# Patient Record
Sex: FEMALE | Race: WHITE | NOT HISPANIC OR LATINO | Employment: UNEMPLOYED | RURAL
[De-identification: names, ages, dates, MRNs, and addresses within clinical notes are randomized per-mention and may not be internally consistent; named-entity substitution may affect disease eponyms.]

---

## 2020-12-09 ENCOUNTER — HISTORICAL (OUTPATIENT)
Dept: ADMINISTRATIVE | Facility: HOSPITAL | Age: 45
End: 2020-12-09

## 2020-12-09 LAB — SARS-COV+SARS-COV-2 AG RESP QL IA.RAPID: NEGATIVE

## 2020-12-10 ENCOUNTER — HISTORICAL (OUTPATIENT)
Dept: ADMINISTRATIVE | Facility: HOSPITAL | Age: 45
End: 2020-12-10

## 2020-12-13 LAB
MAYO GENERIC ORDERABLE RESULT: NORMAL
MAYO INTERP AND REPORT: NORMAL

## 2021-08-04 ENCOUNTER — OFFICE VISIT (OUTPATIENT)
Dept: FAMILY MEDICINE | Facility: CLINIC | Age: 46
End: 2021-08-04
Payer: COMMERCIAL

## 2021-08-04 VITALS
BODY MASS INDEX: 24.49 KG/M2 | OXYGEN SATURATION: 97 % | TEMPERATURE: 99 F | WEIGHT: 147 LBS | RESPIRATION RATE: 18 BRPM | HEIGHT: 65 IN | HEART RATE: 98 BPM | DIASTOLIC BLOOD PRESSURE: 80 MMHG | SYSTOLIC BLOOD PRESSURE: 100 MMHG

## 2021-08-04 DIAGNOSIS — Z20.822 SUSPECTED COVID-19 VIRUS INFECTION: ICD-10-CM

## 2021-08-04 DIAGNOSIS — Z20.828 CONTACT WITH AND (SUSPECTED) EXPOSURE TO OTHER VIRAL COMMUNICABLE DISEASES: Primary | ICD-10-CM

## 2021-08-04 LAB
CTP QC/QA: YES
SARS-COV-2 AG RESP QL IA.RAPID: NEGATIVE

## 2021-08-04 PROCEDURE — 87635 SARS-COV-2 (COVID-19) QUALITATIVE PCR: ICD-10-PCS | Mod: ,,, | Performed by: CLINICAL MEDICAL LABORATORY

## 2021-08-04 PROCEDURE — 99213 PR OFFICE/OUTPT VISIT, EST, LEVL III, 20-29 MIN: ICD-10-PCS | Mod: ,,, | Performed by: FAMILY MEDICINE

## 2021-08-04 PROCEDURE — 87426 SARS CORONAVIRUS 2 ANTIGEN POCT: ICD-10-PCS | Mod: QW,,, | Performed by: FAMILY MEDICINE

## 2021-08-04 PROCEDURE — 87635 SARS-COV-2 COVID-19 AMP PRB: CPT | Mod: ,,, | Performed by: CLINICAL MEDICAL LABORATORY

## 2021-08-04 PROCEDURE — 87426 SARSCOV CORONAVIRUS AG IA: CPT | Mod: QW,,, | Performed by: FAMILY MEDICINE

## 2021-08-04 PROCEDURE — 99213 OFFICE O/P EST LOW 20 MIN: CPT | Mod: ,,, | Performed by: FAMILY MEDICINE

## 2021-08-04 RX ORDER — ACETAMINOPHEN 500 MG
1000 TABLET ORAL EVERY 8 HOURS PRN
Qty: 60 TABLET | Refills: 1 | Status: SHIPPED | OUTPATIENT
Start: 2021-08-04 | End: 2023-04-03

## 2021-08-04 RX ORDER — IPRATROPIUM BROMIDE 42 UG/1
2 SPRAY, METERED NASAL 3 TIMES DAILY
Qty: 15 ML | Refills: 1 | Status: SHIPPED | OUTPATIENT
Start: 2021-08-04 | End: 2023-04-03

## 2021-08-04 RX ORDER — GUAIFENESIN/DEXTROMETHORPHAN 100-10MG/5
5 SYRUP ORAL EVERY 6 HOURS PRN
Qty: 236 ML | Refills: 1 | Status: SHIPPED | OUTPATIENT
Start: 2021-08-04 | End: 2021-08-14

## 2021-08-04 RX ORDER — TOPIRAMATE 100 MG/1
100 TABLET, FILM COATED ORAL 2 TIMES DAILY
COMMUNITY
End: 2023-06-21 | Stop reason: SDUPTHER

## 2021-08-04 RX ORDER — SUMATRIPTAN SUCCINATE 100 MG/1
100 TABLET ORAL DAILY PRN
COMMUNITY
End: 2023-04-03 | Stop reason: ALTCHOICE

## 2021-08-06 DIAGNOSIS — U07.1 COVID-19 VIRUS DETECTED: ICD-10-CM

## 2021-08-06 LAB — SARS-COV-2 RNA RESP QL NAA+PROBE: POSITIVE

## 2021-08-09 ENCOUNTER — INFUSION (OUTPATIENT)
Dept: INFECTIOUS DISEASES | Facility: HOSPITAL | Age: 46
End: 2021-08-09
Attending: FAMILY MEDICINE
Payer: COMMERCIAL

## 2021-08-09 VITALS
TEMPERATURE: 99 F | SYSTOLIC BLOOD PRESSURE: 113 MMHG | DIASTOLIC BLOOD PRESSURE: 81 MMHG | BODY MASS INDEX: 24.75 KG/M2 | OXYGEN SATURATION: 98 % | RESPIRATION RATE: 20 BRPM | HEART RATE: 79 BPM | HEIGHT: 64 IN | WEIGHT: 145 LBS

## 2021-08-09 DIAGNOSIS — U07.1 COVID-19: Primary | ICD-10-CM

## 2021-08-09 PROCEDURE — M0243 CASIRIVI AND IMDEVI INFUSION: HCPCS | Performed by: FAMILY MEDICINE

## 2021-08-09 PROCEDURE — 25000003 PHARM REV CODE 250: Performed by: FAMILY MEDICINE

## 2021-08-09 PROCEDURE — 96365 THER/PROPH/DIAG IV INF INIT: CPT

## 2021-08-09 PROCEDURE — 63600175 PHARM REV CODE 636 W HCPCS: Performed by: FAMILY MEDICINE

## 2021-08-09 RX ORDER — ACETAMINOPHEN 325 MG/1
650 TABLET ORAL ONCE AS NEEDED
Status: DISCONTINUED | OUTPATIENT
Start: 2021-08-09 | End: 2023-04-03 | Stop reason: ALTCHOICE

## 2021-08-09 RX ORDER — ONDANSETRON 4 MG/1
4 TABLET, ORALLY DISINTEGRATING ORAL ONCE AS NEEDED
Status: DISCONTINUED | OUTPATIENT
Start: 2021-08-09 | End: 2023-04-03 | Stop reason: ALTCHOICE

## 2021-08-09 RX ORDER — ALBUTEROL SULFATE 90 UG/1
2 AEROSOL, METERED RESPIRATORY (INHALATION)
Status: DISCONTINUED | OUTPATIENT
Start: 2021-08-09 | End: 2023-04-03 | Stop reason: ALTCHOICE

## 2021-08-09 RX ORDER — SODIUM CHLORIDE 0.9 % (FLUSH) 0.9 %
10 SYRINGE (ML) INJECTION
Status: DISCONTINUED | OUTPATIENT
Start: 2021-08-09 | End: 2023-05-17 | Stop reason: CLARIF

## 2021-08-09 RX ORDER — EPINEPHRINE 0.3 MG/.3ML
0.3 INJECTION SUBCUTANEOUS
Status: DISCONTINUED | OUTPATIENT
Start: 2021-08-09 | End: 2023-04-03 | Stop reason: ALTCHOICE

## 2021-08-09 RX ORDER — DIPHENHYDRAMINE HYDROCHLORIDE 50 MG/ML
25 INJECTION INTRAMUSCULAR; INTRAVENOUS ONCE AS NEEDED
Status: DISCONTINUED | OUTPATIENT
Start: 2021-08-09 | End: 2023-04-03 | Stop reason: ALTCHOICE

## 2021-08-09 RX ADMIN — SODIUM CHLORIDE 600 MG: 9 INJECTION, SOLUTION INTRAVENOUS at 12:08

## 2022-01-19 ENCOUNTER — LAB REQUISITION (OUTPATIENT)
Dept: LAB | Facility: HOSPITAL | Age: 47
End: 2022-01-19
Attending: NURSE PRACTITIONER
Payer: OTHER GOVERNMENT

## 2022-01-19 DIAGNOSIS — Z20.822 CONTACT WITH AND (SUSPECTED) EXPOSURE TO COVID-19: ICD-10-CM

## 2022-01-19 LAB — SARS-COV+SARS-COV-2 AG RESP QL IA.RAPID: NEGATIVE

## 2022-01-19 PROCEDURE — 87426 SARSCOV CORONAVIRUS AG IA: CPT | Performed by: NURSE PRACTITIONER

## 2023-03-29 ENCOUNTER — OFFICE VISIT (OUTPATIENT)
Dept: FAMILY MEDICINE | Facility: CLINIC | Age: 48
End: 2023-03-29
Payer: COMMERCIAL

## 2023-03-29 VITALS
WEIGHT: 155.19 LBS | DIASTOLIC BLOOD PRESSURE: 80 MMHG | OXYGEN SATURATION: 100 % | BODY MASS INDEX: 26.49 KG/M2 | SYSTOLIC BLOOD PRESSURE: 108 MMHG | RESPIRATION RATE: 18 BRPM | TEMPERATURE: 98 F | HEART RATE: 63 BPM | HEIGHT: 64 IN

## 2023-03-29 DIAGNOSIS — S29.012A STRAIN OF THORACIC PARASPINAL MUSCLES EXCLUDING T1 AND T2 LEVELS, INITIAL ENCOUNTER: Primary | ICD-10-CM

## 2023-03-29 DIAGNOSIS — Z12.11 ENCOUNTER FOR SCREENING FOR MALIGNANT NEOPLASM OF COLON: ICD-10-CM

## 2023-03-29 PROBLEM — Z20.828 CONTACT WITH AND (SUSPECTED) EXPOSURE TO OTHER VIRAL COMMUNICABLE DISEASES: Status: RESOLVED | Noted: 2021-08-04 | Resolved: 2023-03-29

## 2023-03-29 PROBLEM — Z20.822 SUSPECTED COVID-19 VIRUS INFECTION: Status: RESOLVED | Noted: 2021-08-04 | Resolved: 2023-03-29

## 2023-03-29 PROCEDURE — 1160F RVW MEDS BY RX/DR IN RCRD: CPT | Mod: ,,, | Performed by: NURSE PRACTITIONER

## 2023-03-29 PROCEDURE — 99213 PR OFFICE/OUTPT VISIT, EST, LEVL III, 20-29 MIN: ICD-10-PCS | Mod: 25,,, | Performed by: NURSE PRACTITIONER

## 2023-03-29 PROCEDURE — 3008F PR BODY MASS INDEX (BMI) DOCUMENTED: ICD-10-PCS | Mod: ,,, | Performed by: NURSE PRACTITIONER

## 2023-03-29 PROCEDURE — 3074F PR MOST RECENT SYSTOLIC BLOOD PRESSURE < 130 MM HG: ICD-10-PCS | Mod: ,,, | Performed by: NURSE PRACTITIONER

## 2023-03-29 PROCEDURE — 3008F BODY MASS INDEX DOCD: CPT | Mod: ,,, | Performed by: NURSE PRACTITIONER

## 2023-03-29 PROCEDURE — 1159F MED LIST DOCD IN RCRD: CPT | Mod: ,,, | Performed by: NURSE PRACTITIONER

## 2023-03-29 PROCEDURE — 3074F SYST BP LT 130 MM HG: CPT | Mod: ,,, | Performed by: NURSE PRACTITIONER

## 2023-03-29 PROCEDURE — 1159F PR MEDICATION LIST DOCUMENTED IN MEDICAL RECORD: ICD-10-PCS | Mod: ,,, | Performed by: NURSE PRACTITIONER

## 2023-03-29 PROCEDURE — 3079F DIAST BP 80-89 MM HG: CPT | Mod: ,,, | Performed by: NURSE PRACTITIONER

## 2023-03-29 PROCEDURE — 96372 PR INJECTION,THERAP/PROPH/DIAG2ST, IM OR SUBCUT: ICD-10-PCS | Mod: ,,, | Performed by: NURSE PRACTITIONER

## 2023-03-29 PROCEDURE — 1160F PR REVIEW ALL MEDS BY PRESCRIBER/CLIN PHARMACIST DOCUMENTED: ICD-10-PCS | Mod: ,,, | Performed by: NURSE PRACTITIONER

## 2023-03-29 PROCEDURE — 96372 THER/PROPH/DIAG INJ SC/IM: CPT | Mod: ,,, | Performed by: NURSE PRACTITIONER

## 2023-03-29 PROCEDURE — 3079F PR MOST RECENT DIASTOLIC BLOOD PRESSURE 80-89 MM HG: ICD-10-PCS | Mod: ,,, | Performed by: NURSE PRACTITIONER

## 2023-03-29 PROCEDURE — 99213 OFFICE O/P EST LOW 20 MIN: CPT | Mod: 25,,, | Performed by: NURSE PRACTITIONER

## 2023-03-29 RX ORDER — FAMOTIDINE 20 MG/1
20 TABLET, FILM COATED ORAL 2 TIMES DAILY
COMMUNITY
End: 2023-10-03

## 2023-03-29 RX ORDER — KETOROLAC TROMETHAMINE 10 MG/1
10 TABLET, FILM COATED ORAL EVERY 6 HOURS
Qty: 20 TABLET | Refills: 0 | Status: SHIPPED | OUTPATIENT
Start: 2023-03-29 | End: 2023-04-03 | Stop reason: SINTOL

## 2023-03-29 RX ORDER — BACLOFEN 10 MG/1
10 TABLET ORAL 3 TIMES DAILY
Qty: 90 TABLET | Refills: 11 | Status: SHIPPED | OUTPATIENT
Start: 2023-03-29 | End: 2023-04-03

## 2023-03-29 RX ORDER — KETOROLAC TROMETHAMINE 30 MG/ML
30 INJECTION, SOLUTION INTRAMUSCULAR; INTRAVENOUS
Status: COMPLETED | OUTPATIENT
Start: 2023-03-29 | End: 2023-03-29

## 2023-03-29 RX ADMIN — KETOROLAC TROMETHAMINE 30 MG: 30 INJECTION, SOLUTION INTRAMUSCULAR; INTRAVENOUS at 04:03

## 2023-03-29 NOTE — PROGRESS NOTES
Jimena Montez NP   [facility]  [address]  [place]  [address]      PATIENT NAME: Rubia Contreras  : 1975  DATE: 3/29/23  MRN: [id number]      Billing Provider: Jimena Montez NP  Level of Service: LA OFFICE/OUTPT VISIT, EST, LEVL III, 20-29 MIN  Patient PCP Information       Provider PCP Type    Jimena Montez NP General            Reason for Visit / Chief Complaint: Back Pain (Upper Right back pain. Onset 1 week. Pain started in back and has now moved to the chest area Hurts more when she takes a deep breath. She states that she has only been performing normal work duties.) and Chest Pain (Pain in Upper Right chest area. Currently more painful than original back pain. Hurts more when she takes a deep breath. )         History of Present Illness / Problem Focused Workflow     Rubia Contreras presents to the clinic with Back Pain (Upper Right back pain. Onset 1 week. Pain started in back and has now moved to the chest area Hurts more when she takes a deep breath. She states that she has only been performing normal work duties.) and Chest Pain (Pain in Upper Right chest area. Currently more painful than original back pain. Hurts more when she takes a deep breath. )     47 year old female patient presents with complaints of pain in right upper back and has now moved in the right chest area. She states it hurts more when she takes a deep breath or with palpation. She reports she does do some heavy lifting at work but has not done anything out of the ordinary that she can remember and does not recall injury. She reports taking Ibuprofen with little relief.       Review of Systems     @Review of Systems   Constitutional:  Negative for activity change, appetite change, fatigue and fever.   HENT:  Negative for nasal congestion, ear pain, rhinorrhea, sinus pressure/congestion and sore throat.    Eyes:  Negative for pain, redness, visual disturbance and eye dryness.    Respiratory:  Negative for cough and shortness of breath.    Cardiovascular:  Negative for chest pain and leg swelling.   Gastrointestinal:  Negative for abdominal distention, abdominal pain, constipation and diarrhea.   Endocrine: Negative for cold intolerance, heat intolerance and polyuria.   Genitourinary:  Negative for bladder incontinence, dysuria, frequency and urgency.   Musculoskeletal:  Positive for myalgias. Negative for arthralgias and gait problem.   Integumentary:  Negative for color change, rash and wound.   Allergic/Immunologic: Negative for environmental allergies and food allergies.   Neurological:  Negative for dizziness, weakness, light-headedness and headaches.   Psychiatric/Behavioral:  Negative for behavioral problems and sleep disturbance.      Medical / Social / Family History     Past Medical History:   Diagnosis Date    Arthritis     Migraines     Neuromuscular disorder        Past Surgical History:   Procedure Laterality Date    ADENOIDECTOMY       SECTION      HYSTERECTOMY      nerve graft left arm         Social History  Ms.  reports that she has been smoking vaping with nicotine. She started smoking about 3 years ago. She has been exposed to tobacco smoke. She has never used smokeless tobacco. She reports current alcohol use of about 7.0 standard drinks per week. She reports that she does not currently use drugs.    Family History  Ms.'s family history includes Atrial fibrillation in her father; Hypertension in her father; Hypothyroidism in her mother.    Medications and Allergies     Medications  Outpatient Medications Marked as Taking for the 3/29/23 encounter (Office Visit) with Jimena Montez NP   Medication Sig Dispense Refill    sumatriptan (IMITREX) 100 MG tablet Take 100 mg by mouth daily as needed.      topiramate (TOPAMAX) 100 MG tablet Take 100 mg by mouth 2 (two) times a day.       Current Facility-Administered Medications for the 3/29/23 encounter (Office  Visit) with Jimena Montez NP   Medication Dose Route Frequency Provider Last Rate Last Admin    acetaminophen tablet 650 mg  650 mg Oral Once PRN Henri Rivas,         albuterol inhaler 2 puff  2 puff Inhalation Q20 Min PRN Henri Rivas DO        diphenhydrAMINE injection 25 mg  25 mg Intravenous Once PRN Henri Rivas DO        EPINEPHrine (EPIPEN) 0.3 mg/0.3 mL pen injection 0.3 mg  0.3 mg Intramuscular PRN Henri Rivas DO        [COMPLETED] ketorolac injection 30 mg  30 mg Intramuscular 1 time in Clinic/HOD Jimena Montez NP   30 mg at 03/29/23 1606    methylPREDNISolone sodium succinate injection 40 mg  40 mg Intravenous Once PRN Henri Rivas DO        ondansetron disintegrating tablet 4 mg  4 mg Oral Once PRN Henri Rivas, DO        sodium chloride 0.9% 500 mL flush bag   Intravenous PRN Henri Rivas, DO        sodium chloride 0.9% flush 10 mL  10 mL Intravenous PRN Henri Rivas DO           Allergies  Review of patient's allergies indicates:  No Known Allergies    Physical Examination     Vitals:    03/29/23 1512   BP: 108/80   Pulse: 63   Resp: 18   Temp: 98.2 °F (36.8 °C)     Physical Exam  Vitals and nursing note reviewed.   HENT:      Head: Normocephalic.      Right Ear: Tympanic membrane normal.      Left Ear: Tympanic membrane normal.      Nose: Nose normal.      Mouth/Throat:      Mouth: Mucous membranes are moist.      Pharynx: Oropharynx is clear. No posterior oropharyngeal erythema.   Eyes:      Conjunctiva/sclera: Conjunctivae normal.   Cardiovascular:      Rate and Rhythm: Normal rate and regular rhythm.      Pulses: Normal pulses.      Heart sounds: Normal heart sounds.   Pulmonary:      Effort: Pulmonary effort is normal.      Breath sounds: Normal breath sounds.   Chest:      Chest wall: Tenderness present.          Comments: Pain and spasms that radiate from right upper back to right chest.   Abdominal:      General: Abdomen  is flat. Bowel sounds are normal. There is no distension.      Palpations: Abdomen is soft.   Musculoskeletal:         General: No swelling. Normal range of motion.      Cervical back: Normal range of motion.      Thoracic back: Spasms and tenderness present.        Back:       Right lower leg: No edema.      Left lower leg: No edema.      Comments: Right upper back pain that radiates through to right chest.    Skin:     General: Skin is warm and dry.      Capillary Refill: Capillary refill takes less than 2 seconds.   Neurological:      Mental Status: She is alert. Mental status is at baseline.   Psychiatric:         Mood and Affect: Mood normal.         Behavior: Behavior normal.             No results found for: WBC, HGB, HCT, MCV, PLT     CMP  No results found for: NA, K, CL, CO2, GLU, BUN, CREATININE, CALCIUM, PROT, ALBUMIN, BILITOT, ALKPHOS, AST, ALT, ANIONGAP, EGFRNORACEVR  Procedures   Assessment and Plan (including Health Maintenance)   :    Plan:           Problem List Items Addressed This Visit          Orthopedic    Strain of thoracic paraspinal muscles excluding T1 and T2 levels - Primary    Current Assessment & Plan     Toradol given IM in clinic.   Toradol oral for inflammation. Baclofen as needed for muscle spasms. Avoid heavy lifting.    Patient was instructed to rest, ice,  take medications as directed, alternate ice/moist heat to area, and monitor for worsening symptoms that require immediate evaluation. Patient was instructed to follow up if symptoms persist past current treatment plan to discuss further options, such as physical therapy, referral to ortho or other diagnostic imaging. Medication side effects/risk/benefits/directions on taking medications were reviewed with patient. Patient verbalized understanding of treatment plan and denies any questions.           Relevant Medications    ketorolac injection 30 mg (Completed)    ketorolac (TORADOL) 10 mg tablet    baclofen (LIORESAL) 10 MG  tablet     Other Visit Diagnoses       Encounter for screening for malignant neoplasm of colon        Relevant Orders    Ambulatory referral/consult to Gastroenterology            The patient has no Health Maintenance topics of status Not Due    No future appointments.     Health Maintenance Due   Topic Date Due    Lipid Panel  Never done    Pneumococcal Vaccines (Age 0-64) (1 - PCV) Never done    TETANUS VACCINE  Never done    Hemoglobin A1c (Diabetic Prevention Screening)  Never done    Colorectal Cancer Screening  Never done        No follow-ups on file.     Signature:  Jimena Montez NP  [place] [address] [address]  [address]    Date of encounter: 3/29/23

## 2023-04-03 ENCOUNTER — OFFICE VISIT (OUTPATIENT)
Dept: FAMILY MEDICINE | Facility: CLINIC | Age: 48
End: 2023-04-03
Payer: COMMERCIAL

## 2023-04-03 VITALS
OXYGEN SATURATION: 99 % | BODY MASS INDEX: 26.63 KG/M2 | DIASTOLIC BLOOD PRESSURE: 58 MMHG | HEIGHT: 64 IN | SYSTOLIC BLOOD PRESSURE: 110 MMHG | WEIGHT: 156 LBS | HEART RATE: 68 BPM | RESPIRATION RATE: 20 BRPM | TEMPERATURE: 98 F

## 2023-04-03 DIAGNOSIS — R07.89 CHEST WALL PAIN: ICD-10-CM

## 2023-04-03 DIAGNOSIS — S46.911D STRAIN OF RIGHT SHOULDER, SUBSEQUENT ENCOUNTER: Primary | ICD-10-CM

## 2023-04-03 DIAGNOSIS — B35.1 ONYCHOMYCOSIS: ICD-10-CM

## 2023-04-03 PROBLEM — S46.911A STRAIN OF RIGHT SHOULDER: Status: ACTIVE | Noted: 2023-04-03

## 2023-04-03 PROCEDURE — 3078F PR MOST RECENT DIASTOLIC BLOOD PRESSURE < 80 MM HG: ICD-10-PCS | Mod: ,,, | Performed by: FAMILY MEDICINE

## 2023-04-03 PROCEDURE — 3078F DIAST BP <80 MM HG: CPT | Mod: ,,, | Performed by: FAMILY MEDICINE

## 2023-04-03 PROCEDURE — 1159F MED LIST DOCD IN RCRD: CPT | Mod: ,,, | Performed by: FAMILY MEDICINE

## 2023-04-03 PROCEDURE — 96372 THER/PROPH/DIAG INJ SC/IM: CPT | Mod: ,,, | Performed by: FAMILY MEDICINE

## 2023-04-03 PROCEDURE — 3074F PR MOST RECENT SYSTOLIC BLOOD PRESSURE < 130 MM HG: ICD-10-PCS | Mod: ,,, | Performed by: FAMILY MEDICINE

## 2023-04-03 PROCEDURE — 99214 OFFICE O/P EST MOD 30 MIN: CPT | Mod: 25,,, | Performed by: FAMILY MEDICINE

## 2023-04-03 PROCEDURE — 1159F PR MEDICATION LIST DOCUMENTED IN MEDICAL RECORD: ICD-10-PCS | Mod: ,,, | Performed by: FAMILY MEDICINE

## 2023-04-03 PROCEDURE — 3074F SYST BP LT 130 MM HG: CPT | Mod: ,,, | Performed by: FAMILY MEDICINE

## 2023-04-03 PROCEDURE — 3008F BODY MASS INDEX DOCD: CPT | Mod: ,,, | Performed by: FAMILY MEDICINE

## 2023-04-03 PROCEDURE — 96372 PR INJECTION,THERAP/PROPH/DIAG2ST, IM OR SUBCUT: ICD-10-PCS | Mod: ,,, | Performed by: FAMILY MEDICINE

## 2023-04-03 PROCEDURE — 3008F PR BODY MASS INDEX (BMI) DOCUMENTED: ICD-10-PCS | Mod: ,,, | Performed by: FAMILY MEDICINE

## 2023-04-03 PROCEDURE — 99214 PR OFFICE/OUTPT VISIT, EST, LEVL IV, 30-39 MIN: ICD-10-PCS | Mod: 25,,, | Performed by: FAMILY MEDICINE

## 2023-04-03 RX ORDER — METHYLPREDNISOLONE ACETATE 40 MG/ML
40 INJECTION, SUSPENSION INTRA-ARTICULAR; INTRALESIONAL; INTRAMUSCULAR; SOFT TISSUE
Status: COMPLETED | OUTPATIENT
Start: 2023-04-03 | End: 2023-04-03

## 2023-04-03 RX ORDER — ASCORBIC ACID 500 MG
500 TABLET ORAL DAILY
COMMUNITY
End: 2023-06-21

## 2023-04-03 RX ORDER — METHOCARBAMOL 500 MG/1
500 TABLET, FILM COATED ORAL 4 TIMES DAILY
Qty: 40 TABLET | Refills: 0 | Status: SHIPPED | OUTPATIENT
Start: 2023-04-03 | End: 2023-04-13

## 2023-04-03 RX ORDER — DICLOFENAC SODIUM 10 MG/G
2 GEL TOPICAL 4 TIMES DAILY
Qty: 60 G | Refills: 2 | Status: SHIPPED | OUTPATIENT
Start: 2023-04-03 | End: 2023-06-21

## 2023-04-03 RX ORDER — TRAMADOL HYDROCHLORIDE 50 MG/1
50 TABLET ORAL EVERY 6 HOURS
Qty: 20 TABLET | Refills: 0 | Status: SHIPPED | OUTPATIENT
Start: 2023-04-03 | End: 2023-05-17

## 2023-04-03 RX ORDER — CICLOPIROX 80 MG/ML
SOLUTION TOPICAL NIGHTLY
Qty: 15 ML | Refills: 5 | Status: SHIPPED | OUTPATIENT
Start: 2023-04-03 | End: 2023-06-21

## 2023-04-03 RX ORDER — B.COAGUL,SUBTILIS/INULIN/VIT C 1B CELL-1G
1 TABLET,CHEWABLE ORAL DAILY
COMMUNITY
End: 2023-10-24

## 2023-04-03 RX ORDER — DICLOFENAC SODIUM 50 MG/1
50 TABLET, DELAYED RELEASE ORAL 2 TIMES DAILY
Qty: 30 TABLET | Refills: 1 | Status: SHIPPED | OUTPATIENT
Start: 2023-04-03 | End: 2023-06-21

## 2023-04-03 RX ADMIN — METHYLPREDNISOLONE ACETATE 40 MG: 40 INJECTION, SUSPENSION INTRA-ARTICULAR; INTRALESIONAL; INTRAMUSCULAR; SOFT TISSUE at 10:04

## 2023-04-03 NOTE — LETTER
April 3, 2023      [facility]  [address]  [place] [address]  Phone: [phone]  Fax: [phone]       Patient: Rubia Contreras   YOB: 1975  Date of Visit: 04/03/2023    To Whom It May Concern:    Kb Contreras  was at [facility] on 04/03/2023. The patient may return to work/school on 04/10/2023 with no  restrictions. If you have any questions or concerns, or if I can be of further assistance, please do not hesitate to contact me.    Sincerely,    Mustapha Pérez LPN

## 2023-04-03 NOTE — ASSESSMENT & PLAN NOTE
X-rays of the cervical spine right shoulder and chest showed no abnormalities except for some mild arthritis in the right shoulder.  Exam shows shoulder girdle tenderness both anterior and posterior.  Suspicion a shoulder strain so will treat her with Depo-Medrol 40 IM today.  Start her on Robaxin 500 q.i.d. and diclofenac 50 mg 2-3 times daily.  Recommended Voltaren gel 3 times daily.  Physical therapy has been prescribed.  No work for the next 3 days.  Follow up in 3 days if not improved.

## 2023-04-03 NOTE — ASSESSMENT & PLAN NOTE
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  Will treat with Penlac daily for the next 6-9 months.  Explained the process to the patient.  Follow-up should be on a p.r.n. basis.

## 2023-04-03 NOTE — PROGRESS NOTES
Manny Hernandez DO   [place] [id number] [facility] [address]  [place]  [address]      PATIENT NAME: Rubia Contreras  : 1975  DATE: 4/3/23  MRN: [id number]      Billing Provider: Manny Hernandez DO  Level of Service:   Patient PCP Information       Provider PCP Type    Jimena Montez NP General            Reason for Visit / Chief Complaint: Back Pain (Patient states she has been having upper back pain for over 1 week. She was seen last Wednesday(23) and given some toradol and baclofen. She developed petechiae and some swelling to right forearm. She was instructed to stop toradol. ) and Chest Pain (Pain in upper back with radiation into chest. Patient states she can touch her chest and it is painful. She did have bad URI a few weeks ago and isn't sure if maybe the coughing has caused the pain or not. Patient states breathing deeply in very painful.)       Update PCP  Update Chief Complaint         History of Present Illness / Problem Focused Workflow     Rubia Contreras presents to the clinic with Back Pain (Patient states she has been having upper back pain for over 1 week. She was seen last Wednesday(23) and given some toradol and baclofen. She developed petechiae and some swelling to right forearm. She was instructed to stop toradol. ) and Chest Pain (Pain in upper back with radiation into chest. Patient states she can touch her chest and it is painful. She did have bad URI a few weeks ago and isn't sure if maybe the coughing has caused the pain or not. Patient states breathing deeply in very painful.)     Patient reports pain in her right shoulder right forearm as well as her anterior chest on the right and posterior on the right.  She also has some mild neck pain primarily on the right.  Patient does not report called injuring the right shoulder however she does heavy lifting every day at work.  She denies any numbness or tingling.  She denies any cough or sputum  production but does report pain with deep breathing.  Patient denies any fever chills or sweats.      Review of Systems     Review of Systems   Constitutional:  Negative for activity change, appetite change, chills, fatigue and fever.   HENT:  Negative for nasal congestion, ear discharge, ear pain, mouth dryness, mouth sores, postnasal drip, sinus pressure/congestion, sore throat and voice change.    Eyes:  Negative for pain, discharge, redness, itching and visual disturbance.   Respiratory:  Negative for apnea, cough, chest tightness, shortness of breath and wheezing.    Cardiovascular:  Positive for chest pain. Negative for palpitations and leg swelling.   Gastrointestinal:  Negative for abdominal distention, abdominal pain, anal bleeding, blood in stool, change in bowel habit, constipation, diarrhea, nausea, vomiting, reflux and change in bowel habit.   Endocrine: Negative for cold intolerance, heat intolerance, polydipsia, polyphagia and polyuria.   Genitourinary:  Negative for difficulty urinating, enuresis, frequency, genital sores, hematuria, hot flashes, menstrual irregularity, urgency and vaginal dryness.   Musculoskeletal:  Positive for arthralgias and neck pain. Negative for back pain, gait problem, leg pain and myalgias.   Integumentary:  Negative for rash, mole/lesion, breast mass, breast discharge and breast tenderness.   Allergic/Immunologic: Negative for environmental allergies and food allergies.   Neurological:  Negative for dizziness, vertigo, tremors, seizures, syncope, facial asymmetry, speech difficulty, weakness, light-headedness, numbness, headaches, coordination difficulties, memory loss and coordination difficulties.   Hematological:  Negative for adenopathy. Does not bruise/bleed easily.   Psychiatric/Behavioral:  Negative for agitation, behavioral problems, confusion, decreased concentration, dysphoric mood, hallucinations, self-injury, sleep disturbance and suicidal ideas. The patient is  not nervous/anxious and is not hyperactive.    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  reports that she has been smoking vaping with nicotine and cigarettes. She started smoking about 30 years ago. She has been smoking an average of .5 packs per day. She has been exposed to tobacco smoke. She has never used smokeless tobacco. She reports current alcohol use of about 7.0 standard drinks per week. She reports that she does not currently use drugs after having used the following drugs: Hydrocodone.    Family History  Ms.'s family history includes Atrial fibrillation in her father; Cerebral palsy in her daughter; Cirrhosis in her mother; Hypertension in her father; Hypothyroidism in her mother; No Known Problems in her daughter and son; Pancreatitis in her brother.    Medications and Allergies     Medications  Outpatient Medications Marked as Taking for the 4/3/23 encounter (Office Visit) with Manny Hernandez, DO   Medication Sig Dispense Refill    ascorbic acid, vitamin C, (VITAMIN C) 500 MG tablet Take 500 mg by mouth once daily.      B.coagul,subtilis-inulin-vit C (CULTURELLE PROBIOTIC-PREBIOTIC) 1 billion cell- 1 gram-15 mg Chew Take 1 capsule by mouth Daily.      famotidine (PEPCID) 20 MG tablet Take 20 mg by mouth 2 (two) times daily.      topiramate (TOPAMAX) 100 MG tablet Take 100 mg by mouth 2 (two) times a day.      [DISCONTINUED]  baclofen (LIORESAL) 10 MG tablet Take 1 tablet (10 mg total) by mouth 3 (three) times daily. 90 tablet 11    [DISCONTINUED] sumatriptan (IMITREX) 100 MG tablet Take 100 mg by mouth daily as needed.       Current Facility-Administered Medications for the 4/3/23 encounter (Office Visit) with Manny Hernandez DO   Medication Dose Route Frequency Provider Last Rate Last Admin    [COMPLETED] methylPREDNISolone acetate injection 40 mg  40 mg Intramuscular 1 time in Clinic/HOD Manny Hernandez DO   40 mg at 04/03/23 1026    sodium chloride 0.9% flush 10 mL  10 mL Intravenous PRN Henri Rivas DO        [DISCONTINUED] acetaminophen tablet 650 mg  650 mg Oral Once PRN Henri Rivas DO        [DISCONTINUED] albuterol inhaler 2 puff  2 puff Inhalation Q20 Min PRN Henri Rivas DO        [DISCONTINUED] diphenhydrAMINE injection 25 mg  25 mg Intravenous Once PRN Henri Rivas DO        [DISCONTINUED] EPINEPHrine (EPIPEN) 0.3 mg/0.3 mL pen injection 0.3 mg  0.3 mg Intramuscular PRN Henri Rivas DO        [DISCONTINUED] methylPREDNISolone sodium succinate injection 40 mg  40 mg Intravenous Once PRN Henri Rivas DO        [DISCONTINUED] ondansetron disintegrating tablet 4 mg  4 mg Oral Once PRN Henri Rivas DO        [DISCONTINUED] sodium chloride 0.9% 500 mL flush bag   Intravenous PRN Henri Rivas DO           Allergies  Review of patient's allergies indicates:  No Known Allergies    Physical Examination     Vitals:    04/03/23 0825   BP: (!) 110/58   Pulse: 68   Resp: 20   Temp: 98.1 °F (36.7 °C)     Physical Exam  Vitals reviewed.   Constitutional:       General: She is not in acute distress.     Appearance: Normal appearance. She is normal weight.   HENT:      Head: Normocephalic and atraumatic.      Nose: Nose normal.      Mouth/Throat:      Mouth: Mucous membranes are moist.      Pharynx: Oropharynx is clear. No oropharyngeal exudate or posterior oropharyngeal  erythema.   Eyes:      General: No scleral icterus.     Extraocular Movements: Extraocular movements intact.      Conjunctiva/sclera: Conjunctivae normal.      Pupils: Pupils are equal, round, and reactive to light.   Neck:      Vascular: No carotid bruit.   Cardiovascular:      Rate and Rhythm: Normal rate and regular rhythm.      Pulses: Normal pulses.      Heart sounds: Normal heart sounds. No murmur heard.    No friction rub. No gallop.   Pulmonary:      Effort: Pulmonary effort is normal.      Breath sounds: Normal breath sounds. No wheezing.   Abdominal:      General: Abdomen is flat. Bowel sounds are normal.      Palpations: Abdomen is soft.   Musculoskeletal:         General: Tenderness present. Normal range of motion.      Cervical back: Normal range of motion and neck supple.      Right lower leg: No edema.      Left lower leg: No edema.      Comments: Right chest wall is tender both anterior and posterior particular the pectoralis muscle on the right and the deltoid muscle on the right.  Patient has pain with movement of her cervical spine also.  Neuro is intact strength equal bilaterally sensation intact reflexes 2/4 bilaterally and symmetric in the upper extremities.   Lymphadenopathy:      Cervical: No cervical adenopathy.   Skin:     General: Skin is warm and dry.      Capillary Refill: Capillary refill takes less than 2 seconds.      Coloration: Skin is not jaundiced.      Findings: Lesion present.      Comments: Right large toe toenail bed has yellow debris in the bed without a toenail.  The left shows a a thickened discolored toenail   Neurological:      General: No focal deficit present.      Mental Status: She is alert and oriented to person, place, and time. Mental status is at baseline.      Cranial Nerves: No cranial nerve deficit.      Sensory: No sensory deficit.      Motor: No weakness.      Coordination: Coordination normal.      Gait: Gait normal.      Deep Tendon Reflexes: Reflexes  normal.   Psychiatric:         Mood and Affect: Mood normal.         Behavior: Behavior normal.         Thought Content: Thought content normal.         Judgment: Judgment normal.             No results found for: WBC, HGB, HCT, MCV, PLT       No results found for: NA, K, CL, CO2, GLU, BUN, CREATININE, CALCIUM, PROT, ALBUMIN, BILITOT, ALKPHOS, AST, ALT, ANIONGAP, ESTGFRAFRICA, EGFRNONAA   X-Ray Cervical Spine Complete 5 view  Narrative: EXAMINATION:  XR CERVICAL SPINE COMPLETE 5 VIEW    CLINICAL HISTORY:  Other chest pain    COMPARISON:  None.    TECHNIQUE:  XR CERVICAL SPINE 5 VIEW    FINDINGS:  No fracture is seen. Vertebral body heights and alignment are normal.  The disc space heights are well-maintained.  No significant degenerative change is present.  Impression: No evidence of abnormality demonstrated    Electronically signed by: Lamont Toledo  Date:    04/03/2023  Time:    09:58  X-ray Shoulder 2 or More Views Right  Narrative: EXAMINATION:  XR SHOULDER COMPLETE 2 OR MORE VIEWS RIGHT    CLINICAL HISTORY:  Other chest pain    COMPARISON:  None available    TECHNIQUE:  XR SHOULDER COMPLETE 2 OR MORE VIEWS RIGHT    FINDINGS:  No evidence of fracture seen.  The alignment of the joints appears normal.  Mild acromioclavicular joint degenerative change is present.  No soft tissue abnormality is seen.  Impression: Mild shoulder osteoarthrosis.    Electronically signed by: Lamont Toledo  Date:    04/03/2023  Time:    09:57  X-Ray Chest PA And Lateral  Narrative: EXAMINATION:  XR CHEST PA AND LATERAL    CLINICAL HISTORY:  Other chest pain    COMPARISON:  None available    TECHNIQUE:  XR CHEST PA AND LATERAL    FINDINGS:  The heart and mediastinum are normal in size and configuration.  The pulmonary vascularity is normal in caliber.  No lung infiltrates, effusions, pneumothorax or other abnormality is demonstrated.  Impression: No evidence of cardiopulmonary disease.    Electronically signed by: Lamont  Josegeoff  Date:    04/03/2023  Time:    09:56     Procedures   No results found for: CHOL  No results found for: HDL  No results found for: LDLCALC  No results found for: TRIG  No results found for: CHOLHDL   No results found for: LABA1C, HGBA1C   No results found for: TSH, G5WTTAP, L8GHYRZ, THYROIDAB, FREET4   Assessment and Plan (including Health Maintenance)      Problem List  Smart Sets  Document Outside HM   :    Plan:         Health Maintenance Due   Topic Date Due    Hepatitis C Screening  Never done    Lipid Panel  Never done    Pneumococcal Vaccines (Age 0-64) (1 - PCV) Never done    TETANUS VACCINE  Never done    Hemoglobin A1c (Diabetic Prevention Screening)  Never done    Colorectal Cancer Screening  Never done       Problem List Items Addressed This Visit          Derm    Onychomycosis    Current Assessment & Plan     Bilateral large toe on-call mycosis.  Will treat with Penlac daily for the next 6-9 months.  Explained the process to the patient.  Follow-up should be on a p.r.n. basis.           Relevant Medications    ciclopirox (PENLAC) 8 % Soln       Orthopedic    Strain of right shoulder - Primary    Current Assessment & Plan     X-rays of the cervical spine right shoulder and chest showed no abnormalities except for some mild arthritis in the right shoulder.  Exam shows shoulder girdle tenderness both anterior and posterior.  Suspicion a shoulder strain so will treat her with Depo-Medrol 40 IM today.  Start her on Robaxin 500 q.i.d. and diclofenac 50 mg 2-3 times daily.  Recommended Voltaren gel 3 times daily.  Physical therapy has been prescribed.  No work for the next 3 days.  Follow up in 3 days if not improved.           Relevant Medications    diclofenac (VOLTAREN) 50 MG EC tablet    methocarbamoL (ROBAXIN) 500 MG Tab    methylPREDNISolone acetate injection 40 mg (Completed)    diclofenac sodium (VOLTAREN) 1 % Gel    traMADoL (ULTRAM) 50 mg tablet    Other Relevant Orders    Ambulatory  referral/consult to Physical/Occupational Therapy    Chest wall pain    Current Assessment & Plan     Patient has chest wall tenderness in his likely shoulder girdle strain and or overuse syndrome from her work.  Will start her on Robaxin as well as diclofenac twice daily.  Will also give her shot of Depo-Medrol IM today.  Recommended Aspercreme with lidocaine cane or diclofenac gel on her shoulder 2-3 times daily.  Physical therapy.  Follow-up in 1 week or p.r.n..           Relevant Medications    diclofenac (VOLTAREN) 50 MG EC tablet    methocarbamoL (ROBAXIN) 500 MG Tab    methylPREDNISolone acetate injection 40 mg (Completed)    diclofenac sodium (VOLTAREN) 1 % Gel    traMADoL (ULTRAM) 50 mg tablet    Other Relevant Orders    X-Ray Cervical Spine Complete 5 view (Completed)    X-Ray Chest PA And Lateral (Completed)    X-ray Shoulder 2 or More Views Right (Completed)    Ambulatory referral/consult to Physical/Occupational Therapy       The patient has no Health Maintenance topics of status Not Due    Future Appointments   Date Time Provider Department Center   4/19/2023 12:45 PM Chris Caro MD [facility]        Follow up in about 4 weeks (around 5/1/2023), or if symptoms worsen or fail to improve.     Signature:  Manny Hernandez DO  [facility]  [address] [place]  [address]    Date of encounter: 4/3/23

## 2023-04-03 NOTE — ASSESSMENT & PLAN NOTE
Patient has chest wall tenderness in his likely shoulder girdle strain and or overuse syndrome from her work.  Will start her on Robaxin as well as diclofenac twice daily.  Will also give her shot of Depo-Medrol IM today.  Recommended Aspercreme with lidocaine cane or diclofenac gel on her shoulder 2-3 times daily.  Physical therapy.  Follow-up in 1 week or p.r.n..

## 2023-04-05 ENCOUNTER — CLINICAL SUPPORT (OUTPATIENT)
Dept: REHABILITATION | Facility: HOSPITAL | Age: 48
End: 2023-04-05
Payer: COMMERCIAL

## 2023-04-05 DIAGNOSIS — S46.911D STRAIN OF RIGHT SHOULDER, SUBSEQUENT ENCOUNTER: ICD-10-CM

## 2023-04-05 DIAGNOSIS — R07.89 CHEST WALL PAIN: ICD-10-CM

## 2023-04-05 PROCEDURE — 97110 THERAPEUTIC EXERCISES: CPT | Mod: PN

## 2023-04-05 PROCEDURE — 97161 PT EVAL LOW COMPLEX 20 MIN: CPT | Mod: PN

## 2023-04-05 NOTE — PLAN OF CARE
[facility]   Physical Therapy Initial Evaluation    Date: 2023   Name: Rubia Contreras  Clinic Number: [id number]    Therapy Diagnosis:   Encounter Diagnoses   Name Primary?    Chest wall pain     Strain of right shoulder, subsequent encounter      Physician: Manny Hernandez DO    Physician Orders: PT Eval and Treat    Medical Diagnosis from Referral: chest wall pec strain   Evaluation Date: 2023  Updated Plan of Care Due : 6/3/2023  Authorization Period Expiration: [employer]    Plan of Care Expiration: [employer] cross   Visit # / Visits authorized:     Time In: 945  Time Out: 1030  Total Appointment Time (timed & untimed codes): 45 minutes    Precautions: Standard    Subjective   Date of onset: pt states she began having right chest pain after coughing from having a sinus infection , pt voices she has some bruising in her forearm ,  pt denies bruising in chest.  Pt voices the pain started in her back and most of pain is in the front of her chest in the breast bone.  Pt voices decreased shoulder range of motion and pain with movement.  Pt voices pain with taking a deep breath and exhaling.   Pt voices unable to sleep at night secondary to pain. Pt is ambidextrous .    History of current condition - Rubia reports: hx below      Medical History:   Past Medical History:   Diagnosis Date    Arthritis     Heart burn     Hyperlipidemia     Migraines     Neuromuscular disorder     Raynaud's syndrome        Surgical History:   Rubia Contreras  has a past surgical history that includes Hysterectomy; Adenoidectomy;  section; nerve graft left arm (Left); Tonsillectomy; and Nerve graft (Left).    Medications:   Rubia has a current medication list which includes the following prescription(s): ascorbic acid (vitamin c), culturelle probiotic-prebiotic, ciclopirox, diclofenac, diclofenac sodium, famotidine, methocarbamol, topiramate, and tramadol, and the following  Facility-Administered Medications: sodium chloride 0.9%.    Allergies:   Review of patient's allergies indicates:  No Known Allergies     Imaging, bone scan films:      Prior Therapy: none   Social History:   lives with their family  Occupation: [employer]  Prior Level of Function: indpendent pain free prior to injury   Current Level of Function: pain with shoulder movement.     Pain:  Current 4/10, worst 7/10, best 4/10   Location: right pectoralis shoulder,        Description: Aching, Dull, Tight, and Deep  Aggravating Factors: Laying, Bending, and Touching  Easing Factors: nothing    Patients goals: be able to return to work lie down pain free     Objective       Observation :     Forward Head/Rounded Shoulders :  [x] Yes   [] No   Scapular Winging :  [x] Yes   [] No   Incision :        Comments :     Cervical Spine Clearing : wnl         Range of Motion/Strength :                  Left Extremity                                                                        Right Extremity     AROM   PROM  Strength                  Location   AROM    PROM   Strength    160   5   Shoulder    Flexion 120  3+   160  5                      Abduction   140  3+                             50  5                      ER in Adduction 50  5                               t6  5                      Functional IR t6  5                           ER in 90 Scaption                                     Elbow         Flexion                              Extension                              Pron/Supination          Functional Impairments :  pt has tight pectoralis , difficulty performing scapular retraction limiting shoulder range of motion       Clinical Tests :     Ligamentous Stability :    Rotator Cuff :    Labrum :    Elbow :     Other :    Palpation :  right pec pain, rib pain , scapular pain with retraction                      Limitation/Restriction for FOTO shoulder  Survey    Therapist reviewed FOTO scores for Rubia Tao  Ben on 4/5/2023.   FOTO documents entered into [employer] - see Media section.    Limitation Score: 46%         TREATMENT       Rubia received the treatments listed below:  THERAPEUTIC EXERCISES to develop strength, endurance, ROM, flexibility, and posture for 20  minutes including ultrasound to right pec 1.5 new/cm2 x 4  min         Home Exercises and Patient Education Provided    Education provided:   - home ex program     Written Home Exercises Provided: Patient instructed to cont prior HEP.  Exercises were reviewed and Rubia was able to demonstrate them prior to the end of the session.  Rubia demonstrated good  understanding of the education provided.     See EMR under Patient Instructions for exercises provided 4/5/2023.    Assessment   Rubia is a 48 y.o. female referred to outpatient Physical Therapy with a medical diagnosis of right pectoral strain . Patient presents with pectoralis , pain with rib movement, scapular retraction, decreased shoulder range of motion .     Patient prognosis is Excellent.   Patientt will benefit from skilled outpatient Physical Therapy to address the deficits stated above and in the chart below, provide patient /family education, and to maximize patientt's level of independence.     Plan of care discussed with patient: Yes  Patient's spiritual, cultural and educational needs considered and patient is agreeable to the plan of care and goals as stated below:     Anticipated Barriers for therapy: medical diagnosis of right pectoral strain . Patient presents with pectoralis , pain with rib movement, scapular retraction, decreased shoulder range of motion .     Goals:  Short Term Goals: 4 weeks   Pt will be independent with home ex program   Increase right shoulder flexion and abd to 160   Pt will be able to reach overhead pain free  Pt will be able to palpate without pain in chest wall    Long Term Goals: 6 weeks   Pt will have full range of motion and strength of right  shoulder. 170 flexion and abd  , 5/5 strength.     Plan   Plan of care Certification: 4/5/2023 to 6/3/2023.    Outpatient Physical Therapy 2 times weekly for 8 weeks to include the following interventions: Electrical Stimulation ifc, Manual Therapy, Patient Education, Therapeutic Activities, Ultrasound, and modalities as needed. .     Vinh Pacheco, PT

## 2023-04-10 ENCOUNTER — CLINICAL SUPPORT (OUTPATIENT)
Dept: REHABILITATION | Facility: HOSPITAL | Age: 48
End: 2023-04-10
Payer: COMMERCIAL

## 2023-04-10 DIAGNOSIS — S46.911D STRAIN OF RIGHT SHOULDER, SUBSEQUENT ENCOUNTER: ICD-10-CM

## 2023-04-10 DIAGNOSIS — R07.89 CHEST WALL PAIN: Primary | ICD-10-CM

## 2023-04-10 PROCEDURE — 97110 THERAPEUTIC EXERCISES: CPT | Mod: PN

## 2023-04-10 PROCEDURE — 97035 APP MDLTY 1+ULTRASOUND EA 15: CPT | Mod: PN

## 2023-04-10 NOTE — PROGRESS NOTES
Physical Therapy Treatment Note     [facility]  [facility] Number: [id number]    Therapy Diagnosis:   Encounter Diagnoses   Name Primary?    Chest wall pain Yes    Strain of right shoulder, subsequent encounter      Physician: Manny Hernandez DO    Visit Date: 4/10/2023    Physician: Manny Hernandez DO     Physician Orders: PT Eval and Treat    Medical Diagnosis from Referral: chest wall pec strain   Evaluation Date: 4/5/2023  Updated Plan of Care Due : 6/3/2023  Authorization Period Expiration: blue cross    Plan of Care Expiration: blue cross   Visit # / Visits authorized: 1/ 60    Time In: 1430  Time Out: 1514  Total Billable Time: 44 minutes    Precautions: Standard      Subjective     Pt reports: pectoralis muscle is not as sore more rib /back pain .  She was compliant with home exercise program.       Pain: 3/10  Location: right pectoralis rib pain       Objective     Rubia received therapeutic exercises to develop strength, endurance, ROM, flexibility, and posture for 20 minutes including:    Ube x 5 min   Pulleys x 3 min   Scapular retraction x 15 reps with blue t band  Rib mob rib 4,5,6    webspace of hand stationary while moving shoulder in abduction x 10 reps each rib   Prone pull x 10 reps   Prone ext x  10 reps   Prone horizontal abd x 10 reps   Prone lower trap x 10eps     Pt received cont US x 8 min to rib laterally to 4,5,6   at 1 mhz     ROM Right   shoulder   flexion  170                                          Abd      170                                        ER     90           Home Exercises Provided and Patient Education Provided     Education provided: cont home ex program     Written Home Exercises Provided: Patient instructed to cont prior HEP.  Exercises were reviewed and Rubia was able to demonstrate them prior to the end of the session.  Rubia demonstrated good  understanding of the education provided.     See EMR under Patient Instructions for exercises  provided prior visit.    Assessment     Pt having pain at ribs 4and 5   Rubia Is progressing well towards her goals.   Pt prognosis is Excellent.     Pt will continue to benefit from skilled outpatient physical therapy to address the deficits listed in the problem list box on initial evaluation, provide pt/family education and to maximize pt's level of independence in the home and community environment.     Pt's spiritual, cultural and educational needs considered and pt agreeable to plan of care and goals.      Anticipated Barriers for therapy: medical diagnosis of right pectoral strain . Patient presents with pectoralis , pain with rib movement, scapular retraction, decreased shoulder range of motion .      Goals:  Short Term Goals: 4 weeks   Pt will be independent with home ex program   Increase right shoulder flexion and abd to 160   Pt will be able to reach overhead pain free  Pt will be able to palpate without pain in chest wall     Long Term Goals: 6 weeks   Pt will have full range of motion and strength of right shoulder. 170 flexion and abd  , 5/5 strength.      Plan   Plan of care Certification: 4/5/2023 to 6/3/2023.     Outpatient Physical Therapy 2 times weekly for 8 weeks to include the following interventions: Electrical Stimulation ifc, Manual Therapy, Patient Education, Therapeutic Activities, Ultrasound, and modalities as needed. .     Plan of care has been reestablished with Juany Jacobson LPTA, Carey Donnelly LPTA, Magdalena Wolff LPTA  and Whitley Cook LPTA.       Vinh Pacheco, PT  4/10/2023

## 2023-04-19 ENCOUNTER — OFFICE VISIT (OUTPATIENT)
Dept: SURGERY | Facility: CLINIC | Age: 48
End: 2023-04-19
Attending: SURGERY
Payer: COMMERCIAL

## 2023-04-19 VITALS
TEMPERATURE: 97 F | BODY MASS INDEX: 27.12 KG/M2 | DIASTOLIC BLOOD PRESSURE: 80 MMHG | HEART RATE: 72 BPM | RESPIRATION RATE: 18 BRPM | OXYGEN SATURATION: 99 % | WEIGHT: 158 LBS | SYSTOLIC BLOOD PRESSURE: 108 MMHG

## 2023-04-19 DIAGNOSIS — Z12.11 ENCOUNTER FOR SCREENING FOR MALIGNANT NEOPLASM OF COLON: Primary | ICD-10-CM

## 2023-04-19 PROCEDURE — 1159F PR MEDICATION LIST DOCUMENTED IN MEDICAL RECORD: ICD-10-PCS | Mod: ,,, | Performed by: SURGERY

## 2023-04-19 PROCEDURE — 99214 OFFICE O/P EST MOD 30 MIN: CPT | Mod: PBBFAC | Performed by: SURGERY

## 2023-04-19 PROCEDURE — 99203 PR OFFICE/OUTPT VISIT, NEW, LEVL III, 30-44 MIN: ICD-10-PCS | Mod: S$PBB,,, | Performed by: SURGERY

## 2023-04-19 PROCEDURE — 99203 OFFICE O/P NEW LOW 30 MIN: CPT | Mod: S$PBB,,, | Performed by: SURGERY

## 2023-04-19 PROCEDURE — 3008F PR BODY MASS INDEX (BMI) DOCUMENTED: ICD-10-PCS | Mod: ,,, | Performed by: SURGERY

## 2023-04-19 PROCEDURE — 1159F MED LIST DOCD IN RCRD: CPT | Mod: ,,, | Performed by: SURGERY

## 2023-04-19 PROCEDURE — 3079F DIAST BP 80-89 MM HG: CPT | Mod: ,,, | Performed by: SURGERY

## 2023-04-19 PROCEDURE — 3074F PR MOST RECENT SYSTOLIC BLOOD PRESSURE < 130 MM HG: ICD-10-PCS | Mod: ,,, | Performed by: SURGERY

## 2023-04-19 PROCEDURE — 3079F PR MOST RECENT DIASTOLIC BLOOD PRESSURE 80-89 MM HG: ICD-10-PCS | Mod: ,,, | Performed by: SURGERY

## 2023-04-19 PROCEDURE — 3008F BODY MASS INDEX DOCD: CPT | Mod: ,,, | Performed by: SURGERY

## 2023-04-19 PROCEDURE — 3074F SYST BP LT 130 MM HG: CPT | Mod: ,,, | Performed by: SURGERY

## 2023-04-19 RX ORDER — SODIUM CHLORIDE, SODIUM LACTATE, POTASSIUM CHLORIDE, CALCIUM CHLORIDE 600; 310; 30; 20 MG/100ML; MG/100ML; MG/100ML; MG/100ML
INJECTION, SOLUTION INTRAVENOUS CONTINUOUS
Status: CANCELLED | OUTPATIENT
Start: 2023-04-19

## 2023-04-19 NOTE — PROGRESS NOTES
General Surgery History and Physical      Patient ID: Rubia Contreras is a 48 y.o. female.    Chief Complaint: Colonoscopy (Screening)      HPI:  Patient is a 49 yo female who is here for evaluation for a colonoscopy.  She has never had a c-scope.  They deny any abdominal pain, nausea, weight loss, constipation, nor any blood in stool.  They deny any family history of colon cancer.      Review of Systems   Constitutional:  Negative for activity change, appetite change, fatigue and fever.   HENT:  Negative for trouble swallowing.    Respiratory:  Negative for cough and shortness of breath.    Cardiovascular:  Negative for chest pain and palpitations.   Gastrointestinal:  Negative for abdominal distention, abdominal pain, blood in stool, constipation and diarrhea.   Genitourinary:  Negative for flank pain.   Musculoskeletal:  Negative for neck pain and neck stiffness.   Neurological:  Negative for weakness.     Current Outpatient Medications   Medication Sig Dispense Refill    B.coagul,subtilis-inulin-vit C (CULTURELLE PROBIOTIC-PREBIOTIC) 1 billion cell- 1 gram-15 mg Chew Take 1 capsule by mouth Daily.      ciclopirox (PENLAC) 8 % Soln Apply topically nightly. 15 mL 5    diclofenac sodium (VOLTAREN) 1 % Gel Apply 2 g topically 4 (four) times daily. 60 g 2    famotidine (PEPCID) 20 MG tablet Take 20 mg by mouth 2 (two) times daily.      topiramate (TOPAMAX) 100 MG tablet Take 100 mg by mouth 2 (two) times a day.      ascorbic acid, vitamin C, (VITAMIN C) 500 MG tablet Take 500 mg by mouth once daily.      diclofenac (VOLTAREN) 50 MG EC tablet Take 1 tablet (50 mg total) by mouth 2 (two) times daily. (Patient not taking: Reported on 4/19/2023) 30 tablet 1    traMADoL (ULTRAM) 50 mg tablet Take 1 tablet (50 mg total) by mouth every 6 (six) hours. 20 tablet 0     Current Facility-Administered Medications   Medication  Dose Route Frequency Provider Last Rate Last Admin    sodium chloride 0.9% flush 10 mL  10 mL Intravenous PRN Henri Rivas DO           Review of patient's allergies indicates:  No Known Allergies    Past Medical History:   Diagnosis Date    Arthritis     Heart burn     Hyperlipidemia     Migraines     Neuromuscular disorder     Raynaud's syndrome        Past Surgical History:   Procedure Laterality Date    ADENOIDECTOMY       SECTION      HYSTERECTOMY      total with 1 ovary preservation    NERVE GRAFT Left     nerve removed from left ankle to repair radial nerve in left wrist    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    TONSILLECTOMY         Family History   Problem Relation Age of Onset    Hypothyroidism Mother     Cirrhosis Mother     Hypertension Father     Atrial fibrillation Father     Pancreatitis Brother     No Known Problems Daughter     Cerebral palsy Daughter     No Known Problems Son        Social History     Socioeconomic History    Marital status:     Number of children: 3   Tobacco Use    Smoking status: Every Day     Packs/day: 0.50     Types: Vaping with nicotine, Cigarettes     Start date:      Last attempt to quit: 2018     Years since quittin.2     Passive exposure: Past    Smokeless tobacco: Never   Substance and Sexual Activity    Alcohol use: Yes     Alcohol/week: 7.0 standard drinks     Types: 7 Drinks containing 0.5 oz of alcohol per week     Comment: 1 mixed drink per day    Drug use: Not Currently     Types: Hydrocodone     Comment: past use    Sexual activity: Yes     Partners: Male     Birth control/protection: See Surgical Hx       Vitals:    23 1302   BP: 108/80   Pulse: 72   Resp: 18   Temp: 97.1 °F (36.2 °C)       Physical Exam  Constitutional:       General: She is not in acute distress.  HENT:      Head: Normocephalic.   Cardiovascular:      Rate and Rhythm: Normal rate and  regular rhythm.      Pulses: Normal pulses.   Pulmonary:      Effort: Pulmonary effort is normal. No respiratory distress.      Breath sounds: Normal breath sounds.   Abdominal:      General: Abdomen is flat. There is no distension.      Palpations: Abdomen is soft.      Tenderness: There is no abdominal tenderness.   Musculoskeletal:         General: Normal range of motion.   Skin:     General: Skin is warm.   Neurological:      General: No focal deficit present.      Mental Status: She is oriented to person, place, and time.       Assessment & Plan:    Encounter for screening for malignant neoplasm of colon  -     Ambulatory referral/consult to Gastroenterology        Patient to go to the operating room for a colonoscopy. Risks and benefits explained to the patient including risk of bleeding, infection, missed lesion, perforation, and possible need for additional operation. All questions were answered.

## 2023-05-09 NOTE — PLAN OF CARE
Physical Therapy Treatment Note      [facility]  [facility] Number[facility] [id number]     Therapy Diagnosis:        Encounter Diagnoses   Name Primary?    Chest wall pain Yes    Strain of right shoulder, subsequent encounter        Physician: Manny Hernandez DO     Visit Date: 4/10/2023     Physician: Manny Hernandez DO     Physician Orders: PT Eval and Treat    Medical Diagnosis from Referral: chest wall pec strain   Evaluation Date: 4/5/2023  Updated Plan of Care Due : 6/3/2023  Authorization Period Expiration: blue cross    Plan of Care Expiration: blue cross   Visit # / Visits authorized: 1/ 60     Time In: 1430  Time Out: 1514  Total Billable Time: 44 minutes     Precautions: Standard        Subjective      Pt reports: pectoralis muscle is not as sore more rib /back pain .  She was compliant with home exercise program.        Pain: 3/10  Location: right pectoralis rib pain        Objective      Rubia received therapeutic exercises to develop strength, endurance, ROM, flexibility, and posture for 20 minutes including:     Ube x 5 min   Pulleys x 3 min   Scapular retraction x 15 reps with blue t band  Rib mob rib 4,5,6    webspace of hand stationary while moving shoulder in abduction x 10 reps each rib   Prone pull x 10 reps   Prone ext x  10 reps   Prone horizontal abd x 10 reps   Prone lower trap x 10eps      Pt received cont US x 8 min to rib laterally to 4,5,6   at 1 mhz      ROM Right   shoulder   flexion  170                                          Abd      170                                        ER     90           Home Exercises Provided and Patient Education Provided      Education provided: cont home ex program      Written Home Exercises Provided: Patient instructed to cont prior HEP.  Exercises were reviewed and Rubia was able to demonstrate them prior to the end of the session.  Rubia demonstrated good  understanding of the education provided.      See EMR under Patient  Instructions for exercises provided prior visit.     Assessment      Pt having pain at ribs 4and 5   Rubia Is progressing well towards her goals.   Pt prognosis is Excellent.      Pt will continue to benefit from skilled outpatient physical therapy to address the deficits listed in the problem list box on initial evaluation, provide pt/family education and to maximize pt's level of independence in the home and community environment.      Pt's spiritual, cultural and educational needs considered and pt agreeable to plan of care and goals.      Anticipated Barriers for therapy: medical diagnosis of right pectoral strain . Patient presents with pectoralis , pain with rib movement, scapular retraction, decreased shoulder range of motion .      Goals:  Short Term Goals: 4 weeks   Pt will be independent with home ex program   Increase right shoulder flexion and abd to 160   Pt will be able to reach overhead pain free  Pt will be able to palpate without pain in chest wall     Long Term Goals: 6 weeks   Pt will have full range of motion and strength of right shoulder. 170 flexion and abd  , 5/5 strength.      Plan     Outpatient Therapy Discharge Summary     Name: Rubia Tao [facility] Number: [id number]    Therapy Diagnosis:   Encounter Diagnoses   Name Primary?    Chest wall pain Yes    Strain of right shoulder, subsequent encounter      Physician: Manny Hernandez,        Assessment    Goals:  Pt progressing with goals .     Discharge reason: Patient requested discharge    Plan   This patient is discharged from Physical Therapy.       Vinh Pacheco, PT

## 2023-05-17 ENCOUNTER — ANESTHESIA EVENT (OUTPATIENT)
Dept: GASTROENTEROLOGY | Facility: HOSPITAL | Age: 48
End: 2023-05-17
Payer: COMMERCIAL

## 2023-05-17 ENCOUNTER — HOSPITAL ENCOUNTER (OUTPATIENT)
Dept: GASTROENTEROLOGY | Facility: HOSPITAL | Age: 48
Discharge: HOME | End: 2023-05-17
Attending: SURGERY | Admitting: SURGERY
Payer: COMMERCIAL

## 2023-05-17 ENCOUNTER — ANESTHESIA (OUTPATIENT)
Dept: GASTROENTEROLOGY | Facility: HOSPITAL | Age: 48
End: 2023-05-17
Payer: COMMERCIAL

## 2023-05-17 VITALS
SYSTOLIC BLOOD PRESSURE: 101 MMHG | WEIGHT: 156 LBS | DIASTOLIC BLOOD PRESSURE: 60 MMHG | TEMPERATURE: 98 F | RESPIRATION RATE: 16 BRPM | HEART RATE: 55 BPM | OXYGEN SATURATION: 100 % | BODY MASS INDEX: 26.78 KG/M2

## 2023-05-17 DIAGNOSIS — Z12.11 ENCOUNTER FOR SCREENING FOR MALIGNANT NEOPLASM OF COLON: ICD-10-CM

## 2023-05-17 PROCEDURE — 25000003 PHARM REV CODE 250: Performed by: NURSE ANESTHETIST, CERTIFIED REGISTERED

## 2023-05-17 PROCEDURE — 45385 COLONOSCOPY W/LESION REMOVAL: CPT | Mod: PT | Performed by: SURGERY

## 2023-05-17 PROCEDURE — D9220A PRA ANESTHESIA: Mod: 33,,, | Performed by: NURSE ANESTHETIST, CERTIFIED REGISTERED

## 2023-05-17 PROCEDURE — D9220A PRA ANESTHESIA: ICD-10-PCS | Mod: 33,,, | Performed by: NURSE ANESTHETIST, CERTIFIED REGISTERED

## 2023-05-17 PROCEDURE — 45385 COLONOSCOPY W/LESION REMOVAL: CPT | Mod: 33,,, | Performed by: SURGERY

## 2023-05-17 PROCEDURE — 45385 PR COLONOSCOPY,REMV LESN,SNARE: ICD-10-PCS | Mod: 33,,, | Performed by: SURGERY

## 2023-05-17 PROCEDURE — 37000009 HC ANESTHESIA EA ADD 15 MINS

## 2023-05-17 PROCEDURE — 27201423 OPTIME MED/SURG SUP & DEVICES STERILE SUPPLY

## 2023-05-17 PROCEDURE — 63600175 PHARM REV CODE 636 W HCPCS: Performed by: NURSE ANESTHETIST, CERTIFIED REGISTERED

## 2023-05-17 PROCEDURE — 88305 SURGICAL PATHOLOGY: ICD-10-PCS | Mod: 26,,, | Performed by: PATHOLOGY

## 2023-05-17 PROCEDURE — 37000008 HC ANESTHESIA 1ST 15 MINUTES

## 2023-05-17 PROCEDURE — 88305 TISSUE EXAM BY PATHOLOGIST: CPT | Mod: TC,SUR | Performed by: SURGERY

## 2023-05-17 PROCEDURE — 88305 TISSUE EXAM BY PATHOLOGIST: CPT | Mod: 26,,, | Performed by: PATHOLOGY

## 2023-05-17 RX ORDER — SUMATRIPTAN SUCCINATE 100 MG/1
100 TABLET ORAL
COMMUNITY
End: 2023-06-21 | Stop reason: SDUPTHER

## 2023-05-17 RX ORDER — SODIUM CHLORIDE, SODIUM LACTATE, POTASSIUM CHLORIDE, CALCIUM CHLORIDE 600; 310; 30; 20 MG/100ML; MG/100ML; MG/100ML; MG/100ML
INJECTION, SOLUTION INTRAVENOUS CONTINUOUS
Status: DISCONTINUED | OUTPATIENT
Start: 2023-05-17 | End: 2023-05-18 | Stop reason: HOSPADM

## 2023-05-17 RX ORDER — LIDOCAINE HYDROCHLORIDE 20 MG/ML
INJECTION, SOLUTION EPIDURAL; INFILTRATION; INTRACAUDAL; PERINEURAL
Status: DISCONTINUED | OUTPATIENT
Start: 2023-05-17 | End: 2023-05-17

## 2023-05-17 RX ORDER — PROPOFOL 10 MG/ML
VIAL (ML) INTRAVENOUS
Status: DISCONTINUED | OUTPATIENT
Start: 2023-05-17 | End: 2023-05-17

## 2023-05-17 RX ADMIN — PROPOFOL 100 MG: 10 INJECTION, EMULSION INTRAVENOUS at 10:05

## 2023-05-17 RX ADMIN — LIDOCAINE HYDROCHLORIDE 80 MG: 20 INJECTION, SOLUTION EPIDURAL; INFILTRATION; INTRACAUDAL; PERINEURAL at 10:05

## 2023-05-17 NOTE — TRANSFER OF CARE
Anesthesia Transfer of Care Note    Patient: Rubia Contreras    Procedure(s) Performed: * No procedures listed *    Patient location: GI    Anesthesia Type: general    Transport from OR: Transported from OR on room air with adequate spontaneous ventilation    Post pain: adequate analgesia    Post assessment: no apparent anesthetic complications    Post vital signs: stable    Level of consciousness: sedated and responds to stimulation    Nausea/Vomiting: no nausea/vomiting    Complications: none    Transfer of care protocol was followedComments: Good SV continue, NAD noted, VSS, RTRN      Last vitals:   Visit Vitals  BP (!) 105/50 (BP Location: Right arm, Patient Position: Lying)   Pulse 75   Temp 36.6 °C (97.8 °F) (Oral)   Resp 15   Wt 70.8 kg (156 lb)   LMP  (LMP Unknown)   SpO2 98%   Breastfeeding No   BMI 26.78 kg/m²

## 2023-05-17 NOTE — ANESTHESIA POSTPROCEDURE EVALUATION
Anesthesia Post Evaluation    Patient: Rubia Contreras    Procedure(s) Performed: * No procedures listed *    Final Anesthesia Type: general      Patient location: [facility].  Patient participation: Yes- Able to Participate  Level of consciousness: awake and alert  Post-procedure vital signs: reviewed and stable  Pain management: adequate  Airway patency: patent    PONV status at discharge: No PONV  Anesthetic complications: no      Cardiovascular status: blood pressure returned to baseline, hemodynamically stable and stable  Respiratory status: unassisted  Hydration status: euvolemic  Follow-up not needed.  Comments: Pt voices appreciation for care          Vitals Value Taken Time   /60 05/17/23 1120   Temp 36.6 °C (97.8 °F) 05/17/23 1056   Pulse 55 05/17/23 1120   Resp 16 05/17/23 1120   SpO2 100 % 05/17/23 1120         Event Time   Out of Recovery 11:20:00         Pain/Anna Score: Anna Score: 10 (5/17/2023 11:05 AM)

## 2023-05-17 NOTE — ANESTHESIA PREPROCEDURE EVALUATION
05/17/2023  Rubia Contreras is a 48 y.o., female.      Pre-op Assessment    I have reviewed the Patient Summary Reports.     I have reviewed the Nursing Notes. I have reviewed the NPO Status.   I have reviewed the Medications.     Review of Systems  Anesthesia Hx:  History of prior surgery of interest to airway management or planning: Denies Family Hx of Anesthesia complications.   Denies Personal Hx of Anesthesia complications.   Cardiovascular:   Rheumatoid arthritis   Neurological:   Neuromuscular Disease, Headaches        Physical Exam  General: Well nourished    Airway:  Mallampati: II   Mouth Opening: Normal  TM Distance: Normal, at least 6 cm  Tongue: Normal  Neck ROM: Normal ROM        Anesthesia Plan  Type of Anesthesia, risks & benefits discussed:    Anesthesia Type: Gen Natural Airway  Intra-op Monitoring Plan: Standard ASA Monitors  Post Op Pain Control Plan: multimodal analgesia  Induction:  IV  Informed Consent: Informed consent signed with the Patient and all parties understand the risks and agree with anesthesia plan.  All questions answered. Patient consented to blood products? No  ASA Score: 2  Day of Surgery Review of History & Physical: H&P Update referred to the surgeon/provider.I have interviewed and examined the patient. I have reviewed the patient's H&P dated: There are no significant changes.     Ready For Surgery From Anesthesia Perspective.     .  Active Ambulatory Problems     Diagnosis Date Noted    Strain of thoracic paraspinal muscles excluding T1 and T2 levels 03/29/2023    Onychomycosis 04/03/2023    Strain of right shoulder 04/03/2023    Chest wall pain 04/03/2023     Resolved Ambulatory Problems     Diagnosis Date Noted    Contact with and (suspected) exposure to other viral communicable diseases 08/04/2021    Suspected COVID-19 virus infection 08/04/2021      Past Medical History:   Diagnosis Date    Arthritis     Heart burn     Hyperlipidemia     Migraines     Neuromuscular disorder     Raynaud's syndrome    Review of patient's allergies indicates:  No Known Allergies   Current Outpatient Medications on File Prior to Encounter   Medication Sig Dispense Refill    ascorbic acid, vitamin C, (VITAMIN C) 500 MG tablet Take 500 mg by mouth once daily.      B.coagul,subtilis-inulin-vit C (CULTURELLE PROBIOTIC-PREBIOTIC) 1 billion cell- 1 gram-15 mg Chew Take 1 capsule by mouth Daily.      ciclopirox (PENLAC) 8 % Soln Apply topically nightly. 15 mL 5    diclofenac (VOLTAREN) 50 MG EC tablet Take 1 tablet (50 mg total) by mouth 2 (two) times daily. (Patient not taking: Reported on 4/19/2023) 30 tablet 1    diclofenac sodium (VOLTAREN) 1 % Gel Apply 2 g topically 4 (four) times daily. 60 g 2    famotidine (PEPCID) 20 MG tablet Take 20 mg by mouth 2 (two) times daily.      topiramate (TOPAMAX) 100 MG tablet Take 100 mg by mouth 2 (two) times a day.      traMADoL (ULTRAM) 50 mg tablet Take 1 tablet (50 mg total) by mouth every 6 (six) hours. 20 tablet 0     Current Facility-Administered Medications on File Prior to Encounter   Medication Dose Route Frequency Provider Last Rate Last Admin    sodium chloride 0.9% flush 10 mL  10 mL Intravenous PRN Henri Rivas DO

## 2023-05-17 NOTE — DISCHARGE SUMMARY
[facility] - Endoscopy  Discharge Note  Short Stay    Colonoscopy      OUTCOME: Patient tolerated treatment/procedure well without complication and is now ready for discharge.    DISPOSITION: Home or Self Care    FINAL DIAGNOSIS:  screening colonoscopy for colon cancer    FOLLOWUP: In clinic    DISCHARGE INSTRUCTIONS:  No discharge procedures on file.     TIME SPENT ON DISCHARGE: 5 minutes

## 2023-05-19 LAB
ESTROGEN SERPL-MCNC: NORMAL PG/ML
INSULIN SERPL-ACNC: NORMAL U[IU]/ML
LAB AP GROSS DESCRIPTION: NORMAL
LAB AP LABORATORY NOTES: NORMAL
T3RU NFR SERPL: NORMAL %

## 2023-06-21 ENCOUNTER — OFFICE VISIT (OUTPATIENT)
Dept: FAMILY MEDICINE | Facility: CLINIC | Age: 48
End: 2023-06-21
Payer: COMMERCIAL

## 2023-06-21 VITALS
DIASTOLIC BLOOD PRESSURE: 80 MMHG | WEIGHT: 158.38 LBS | OXYGEN SATURATION: 98 % | HEART RATE: 66 BPM | RESPIRATION RATE: 18 BRPM | BODY MASS INDEX: 27.04 KG/M2 | HEIGHT: 64 IN | TEMPERATURE: 98 F | SYSTOLIC BLOOD PRESSURE: 122 MMHG

## 2023-06-21 DIAGNOSIS — Z12.39 ENCOUNTER FOR SCREENING FOR MALIGNANT NEOPLASM OF BREAST, UNSPECIFIED SCREENING MODALITY: ICD-10-CM

## 2023-06-21 DIAGNOSIS — Z00.00 ROUTINE GENERAL MEDICAL EXAMINATION AT A HEALTH CARE FACILITY: Primary | ICD-10-CM

## 2023-06-21 DIAGNOSIS — G43.011 INTRACTABLE MIGRAINE WITHOUT AURA AND WITH STATUS MIGRAINOSUS: ICD-10-CM

## 2023-06-21 DIAGNOSIS — Z13.220 SCREENING FOR LIPOID DISORDERS: ICD-10-CM

## 2023-06-21 DIAGNOSIS — F41.1 ANXIETY, GENERALIZED: ICD-10-CM

## 2023-06-21 DIAGNOSIS — Z13.1 SCREENING FOR DIABETES MELLITUS: ICD-10-CM

## 2023-06-21 LAB
CHOLEST SERPL-MCNC: 326 MG/DL (ref 0–200)
CHOLEST/HDLC SERPL: 4.9 {RATIO}
GLUCOSE SERPL-MCNC: 38 MG/DL (ref 74–106)
HDLC SERPL-MCNC: 66 MG/DL (ref 40–60)
LDLC SERPL CALC-MCNC: 218 MG/DL
LDLC/HDLC SERPL: 3.3 {RATIO}
NONHDLC SERPL-MCNC: 260 MG/DL
TRIGL SERPL-MCNC: 208 MG/DL (ref 35–150)
VLDLC SERPL-MCNC: 42 MG/DL

## 2023-06-21 PROCEDURE — 1160F PR REVIEW ALL MEDS BY PRESCRIBER/CLIN PHARMACIST DOCUMENTED: ICD-10-PCS | Mod: ,,,

## 2023-06-21 PROCEDURE — 3074F PR MOST RECENT SYSTOLIC BLOOD PRESSURE < 130 MM HG: ICD-10-PCS | Mod: ,,,

## 2023-06-21 PROCEDURE — 3008F BODY MASS INDEX DOCD: CPT | Mod: ,,,

## 2023-06-21 PROCEDURE — 1159F MED LIST DOCD IN RCRD: CPT | Mod: ,,,

## 2023-06-21 PROCEDURE — 1159F PR MEDICATION LIST DOCUMENTED IN MEDICAL RECORD: ICD-10-PCS | Mod: ,,,

## 2023-06-21 PROCEDURE — 3008F PR BODY MASS INDEX (BMI) DOCUMENTED: ICD-10-PCS | Mod: ,,,

## 2023-06-21 PROCEDURE — 3079F PR MOST RECENT DIASTOLIC BLOOD PRESSURE 80-89 MM HG: ICD-10-PCS | Mod: ,,,

## 2023-06-21 PROCEDURE — 82947 GLUCOSE, FASTING: ICD-10-PCS | Mod: ,,, | Performed by: CLINICAL MEDICAL LABORATORY

## 2023-06-21 PROCEDURE — 3074F SYST BP LT 130 MM HG: CPT | Mod: ,,,

## 2023-06-21 PROCEDURE — 82947 ASSAY GLUCOSE BLOOD QUANT: CPT | Mod: ,,, | Performed by: CLINICAL MEDICAL LABORATORY

## 2023-06-21 PROCEDURE — 3079F DIAST BP 80-89 MM HG: CPT | Mod: ,,,

## 2023-06-21 PROCEDURE — 99396 PR PREVENTIVE VISIT,EST,40-64: ICD-10-PCS | Mod: ,,,

## 2023-06-21 PROCEDURE — 1160F RVW MEDS BY RX/DR IN RCRD: CPT | Mod: ,,,

## 2023-06-21 PROCEDURE — 80061 LIPID PANEL: ICD-10-PCS | Mod: ,,, | Performed by: CLINICAL MEDICAL LABORATORY

## 2023-06-21 PROCEDURE — 80061 LIPID PANEL: CPT | Mod: ,,, | Performed by: CLINICAL MEDICAL LABORATORY

## 2023-06-21 PROCEDURE — 99396 PREV VISIT EST AGE 40-64: CPT | Mod: ,,,

## 2023-06-21 RX ORDER — TOPIRAMATE 100 MG/1
100 TABLET, FILM COATED ORAL 2 TIMES DAILY
Qty: 60 TABLET | Refills: 5 | Status: SHIPPED | OUTPATIENT
Start: 2023-06-21 | End: 2024-01-08 | Stop reason: SDUPTHER

## 2023-06-21 RX ORDER — SUMATRIPTAN SUCCINATE 100 MG/1
100 TABLET ORAL
Qty: 30 TABLET | Refills: 5 | Status: SHIPPED | OUTPATIENT
Start: 2023-06-21

## 2023-06-21 RX ORDER — AMITRIPTYLINE HYDROCHLORIDE 25 MG/1
25 TABLET, FILM COATED ORAL NIGHTLY PRN
Qty: 30 TABLET | Refills: 0 | Status: SHIPPED | OUTPATIENT
Start: 2023-06-21 | End: 2023-10-03

## 2023-06-21 RX ORDER — ALPRAZOLAM 0.25 MG/1
0.25 TABLET ORAL 3 TIMES DAILY
Qty: 45 TABLET | Refills: 0 | Status: SHIPPED | OUTPATIENT
Start: 2023-06-21 | End: 2024-01-08

## 2023-06-21 NOTE — PROGRESS NOTES
Subjective     Patient ID: Rubia Contreras is a 48 y.o. female.    Chief Complaint: Healthy You    49 y/o female presents to clinic for Healthy You and medication refills. Pt states she has not been working for the last 2 months. She states this is the first time without work and she is becoming very anxious. Denies any thoughts of self harm or suicide. Pt has taken Xanax in the past. She is also complaining of increasing migraines and having to take more Imitrex and she is running out before the next refill. She denies any chest pain, SOB, palpitations, fever, chills, dizziness, vision changes, GI symptoms.     Review of Systems   Constitutional:  Negative for chills, fever and unexpected weight change.   HENT:  Negative for nasal congestion, ear pain, sinus pressure/congestion, sore throat and tinnitus.    Eyes:  Negative for pain.   Respiratory:  Negative for cough, chest tightness, shortness of breath and wheezing.    Cardiovascular:  Negative for chest pain and palpitations.   Gastrointestinal:  Negative for abdominal pain, blood in stool, change in bowel habit, nausea, rectal pain, vomiting and change in bowel habit.   Endocrine: Negative for polydipsia, polyphagia and polyuria.   Genitourinary:  Negative for difficulty urinating, dysuria, flank pain, frequency, hematuria and urgency.   Musculoskeletal:  Negative for back pain, joint swelling and neck pain.   Neurological:  Positive for headaches. Negative for dizziness, vertigo, seizures, weakness, light-headedness and numbness.   Psychiatric/Behavioral:  Negative for suicidal ideas. The patient is nervous/anxious.      Tobacco Use: High Risk    Smoking Tobacco Use: Every Day    Smokeless Tobacco Use: Never    Passive Exposure: Past     Review of patient's allergies indicates:  No Known Allergies  Current Outpatient Medications   Medication Instructions    ALPRAZolam (XANAX) 0.25 mg, Oral, 3 times daily    amitriptyline (ELAVIL) 25 mg, Oral,  "Nightly PRN    B.coagul,subtilis-inulin-vit C (CULTURELLE PROBIOTIC-PREBIOTIC) 1 billion cell- 1 gram-15 mg Chew 1 capsule, Oral, Daily    famotidine (PEPCID) 20 mg, Oral, 2 times daily    sumatriptan (IMITREX) 100 mg, Oral, As needed (PRN)    topiramate (TOPAMAX) 100 mg, Oral, 2 times daily     Medications Discontinued During This Encounter   Medication Reason    ascorbic acid, vitamin C, (VITAMIN C) 500 MG tablet Patient no longer taking    ciclopirox (PENLAC) 8 % Soln Patient no longer taking    diclofenac (VOLTAREN) 50 MG EC tablet     diclofenac sodium (VOLTAREN) 1 % Gel     topiramate (TOPAMAX) 100 MG tablet Reorder    sumatriptan (IMITREX) 100 MG tablet Reorder       Past Medical History:   Diagnosis Date    Arthritis     Heart burn     Hyperlipidemia     Migraines     Neuromuscular disorder     Raynaud's syndrome      Health Maintenance Topics with due status: Not Due       Topic Last Completion Date    Colorectal Cancer Screening 05/17/2023       There is no immunization history on file for this patient.    Objective     Body mass index is 27.19 kg/m².  Wt Readings from Last 3 Encounters:   06/21/23 71.8 kg (158 lb 6.4 oz)   05/17/23 70.8 kg (156 lb)   04/19/23 71.7 kg (158 lb)     Ht Readings from Last 3 Encounters:   06/21/23 5' 4" (1.626 m)   04/03/23 5' 4" (1.626 m)   03/29/23 5' 4" (1.626 m)     BP Readings from Last 3 Encounters:   06/21/23 122/80   05/17/23 101/60   04/19/23 108/80     Temp Readings from Last 3 Encounters:   06/21/23 97.8 °F (36.6 °C) (Oral)   05/17/23 97.8 °F (36.6 °C) (Oral)   04/19/23 97.1 °F (36.2 °C)     Pulse Readings from Last 3 Encounters:   06/21/23 66   05/17/23 (!) 55   04/19/23 72     Resp Readings from Last 3 Encounters:   06/21/23 18   05/17/23 16   04/19/23 18     PF Readings from Last 3 Encounters:   No data found for PF       Physical Exam  Vitals and nursing note reviewed.   Constitutional:       General: She is not in acute distress.     Appearance: Normal " appearance. She is overweight.   HENT:      Head: Normocephalic.      Right Ear: Tympanic membrane and ear canal normal.      Left Ear: Tympanic membrane and ear canal normal.      Nose: Nose normal.      Right Sinus: No maxillary sinus tenderness or frontal sinus tenderness.      Left Sinus: No maxillary sinus tenderness or frontal sinus tenderness.      Mouth/Throat:      Lips: Pink.      Mouth: Mucous membranes are moist.      Pharynx: Oropharynx is clear. Uvula midline.   Eyes:      Pupils: Pupils are equal, round, and reactive to light.   Neck:      Vascular: No carotid bruit.   Cardiovascular:      Rate and Rhythm: Normal rate and regular rhythm.      Heart sounds: Normal heart sounds, S1 normal and S2 normal. No murmur heard.    No friction rub. No gallop.   Pulmonary:      Effort: Pulmonary effort is normal. No respiratory distress.      Breath sounds: Normal breath sounds. No decreased breath sounds, wheezing, rhonchi or rales.   Abdominal:      General: Bowel sounds are normal.      Palpations: Abdomen is soft.      Tenderness: There is no abdominal tenderness.   Musculoskeletal:         General: No swelling or tenderness. Normal range of motion.      Cervical back: Normal range of motion and neck supple.   Lymphadenopathy:      Cervical: No cervical adenopathy.   Skin:     General: Skin is warm and dry.      Capillary Refill: Capillary refill takes less than 2 seconds.   Neurological:      Mental Status: She is alert and oriented to person, place, and time.   Psychiatric:         Attention and Perception: Attention normal.         Mood and Affect: Affect normal. Mood is anxious.         Behavior: Behavior normal. Behavior is cooperative.         Thought Content: Thought content normal. Thought content does not include homicidal or suicidal ideation. Thought content does not include homicidal or suicidal plan.       Assessment and Plan     Problem List Items Addressed This Visit          Neuro     Intractable migraine without aura and with status migrainosus     Start Elavil 25mg one tablet at night for prevention of migraines. Continue Topamax and Imitrex at current regimen. Follow up in 1 month to evaluate effectiveness of medication          Relevant Medications    topiramate (TOPAMAX) 100 MG tablet    sumatriptan (IMITREX) 100 MG tablet    amitriptyline (ELAVIL) 25 MG tablet       Psychiatric    Anxiety, generalized     Pt has taken Xanax in the past with improvement of anxiety. Xanax 0.25mg tablet- take one prn for anxiety. RTC 1 month for evaluation.      Reviewed treatment plan and medication side effects/risk/benefits/directions on taking medications. Instructed patient it could take up to 3- 4 weeks before they see full benefit of medication. Patient denies suicidal or homicidal ideations. Should these symptoms develop, encouraged to proceed to the closest ER for additional evaluation and treatment. Instructed patient to return to clinic in 3-4 weeks to discuss effectiveness of drug and any side effects. Patient verbalized understanding of treatment plan and denies any questions.         Relevant Medications    ALPRAZolam (XANAX) 0.25 MG tablet       Cardiac/Vascular    Screening for lipoid disorders - Primary     Lab work obtained today and will call with results and any new orders with understanding voiced.         Relevant Orders    Lipid Panel       Renal/    Encounter for screening for malignant neoplasm of breast     Mammogram will be scheduled         Relevant Orders    Mammo Digital Screening Bilat       Plan: Instructed patient to keep appts as scheduled.   Instructed on DASH diet and increased exercise. Recommended at least 150 minutes a week with resistance training as tolerated. Instructed on importance of taking all medications as prescribed.   Discussed yearly dental and eye exams.    Discussed use of sun screen, helmets and seat belts.  Gun safety discussed  Sleep discussed  Stay  away from tobacco products     Discussed and provided with a screening schedule and personal prevention plan in accordance with USPSTF age appropriate recommendations and screening guidelines.   Education given and reviewed with patient. Counseling and referrals were provided as needed.  After Visit Summary printed and given to patient which includes written education and a list of any referrals if indicated.        I have reviewed the medications, allergies, and problem list.     Goal Actions:    What type of visit is the patient here for today?: Healthy You  Does the patient consent to enroll in [facility] Healthy?: Yes  What is your overall wellness goal? (select at least one): Lose weight, Improve overall health  Choose 3: Biometric, Nutrition, Exercise  Biometric Actions: Attend regularly scheduled office visits  Nurtrition Actions: Recommend weight loss, drink 8-10 glasses of water per day  Exercise Actions: Recommend physical activity 30 minutes per day 3-5 times/week

## 2023-06-21 NOTE — PATIENT INSTRUCTIONS
"Patient Education       High Blood Pressure in Adults   The Basics   Written by the doctors and editors at [facility]   What is high blood pressure? -- High blood pressure is a condition that puts you at risk for heart attack, stroke, and kidney disease. It does not usually cause symptoms. But it can be serious.  When your doctor or nurse tells you your blood pressure, they will say 2 numbers. For instance, your doctor or nurse might say that your blood pressure is "130 over 80." The top number is the pressure inside your arteries when your heart is haseeb. The bottom number is the pressure inside your arteries when your heart is relaxed.  "Elevated blood pressure" is a term doctors or nurses use as a warning. People with elevated blood pressure do not yet have high blood pressure. But their blood pressure is not as low as it should be for good health.  Many experts define high, elevated, and normal blood pressure as follows:  High - Top number of 130 or above and/or bottom number of 80 or above  Elevated - Top number between 120 and 129 and bottom number of 79 or below  Normal - Top number of 119 or below and bottom number of 79 or below  This information is also in the table (table 1).   How can I lower my blood pressure? -- If your doctor or nurse has prescribed blood pressure medicine, the most important thing you can do is to take it. If it causes side effects, do not just stop taking it. Instead, talk to your doctor or nurse about the problems it causes. They might be able to lower your dose or switch you to another medicine. If cost is a problem, mention that too. They might be able to put you on a less expensive medicine. Taking your blood pressure medicine can keep you from having a heart attack or stroke, and it can save your life!  Can I do anything on my own? -- You have a lot of control over your blood pressure. To lower it:  Lose weight (if you are overweight)  Choose a diet low in fat and rich in " "fruits, vegetables, and low-fat dairy products  Reduce the amount of salt you eat  Do something active for at least 30 minutes a day on most days of the week  Cut down on alcohol (if you drink more than 2 alcoholic drinks per day)  It's also a good idea to get a home blood pressure meter. People who check their own blood pressure at home do better at keeping it low and can sometimes even reduce the amount of medicine they take.  All topics are updated as new evidence becomes available and our peer review process is complete.  This topic retrieved from [employer] on: Sep 21, 2021.  Topic 97581 Version 15.0  Release: 29.4.2 - C29.263  © 2021 UpToDate, Inc. and/or its affiliates. All rights reserved.  table 1: Definition of normal and high blood pressure  Level  Top number  Bottom number    High 130 or above 80 or above   Elevated 120 to 129 79 or below   Normal 119 or below 79 or below   These definitions are from the American College of Cardiology/American Heart Association. Other expert groups might use slightly different definitions.  "Elevated blood pressure" is a term doctor or nurses use as a warning. It means you do not yet have high blood pressure, but your blood pressure is not as low as it should be for good health.  Graphic 66127 Version 6.0  Consumer Information Use and Disclaimer   This information is not specific medical advice and does not replace information you receive from your health care provider. This is only a brief summary of general information. It does NOT include all information about conditions, illnesses, injuries, tests, procedures, treatments, therapies, discharge instructions or life-style choices that may apply to you. You must talk with your health care provider for complete information about your health and treatment options. This information should not be used to decide whether or not to accept your health care provider's advice, instructions or recommendations. Only your health care " "provider has the knowledge and training to provide advice that is right for you. The use of this information is governed by the [employer] End User License Agreement, available at https://[email or web address] [email or web address]/solutions/mediaBunker/about/lisa.The use of Last [employer] content is governed by the [employer] Terms of Use. ©2021 UpToDate, Inc. All rights reserved.  Copyright   © 2021 UpToDate, Inc. and/or its affiliates. All rights reserved.    Patient Education       Chronic Kidney Disease   The Basics   Written by the doctors and editors at [employer]   What is chronic kidney disease? -- Chronic kidney disease (CKD) is when the kidneys stop working as well as they should. When they are working normally, the kidneys filter the blood and remove waste and excess salt and water (figure 1).  In people with CKD, the kidneys slowly lose the ability to filter the blood. In time, the kidneys can stop working completely. That is why it is so important to keep CKD from getting worse.  What are the symptoms of CKD? -- At first, CKD causes no symptoms. As the disease gets worse, it can:  Make your feet, ankles, or legs swell (doctors call this "edema")  Give you high blood pressure  Make you very tired  Damage your bones  Is there anything I can do to keep my kidneys from getting worse if I have CKD? -- Yes, you can protect your kidneys by:  Taking blood pressure and other medicines every day, if your doctor or nurse prescribes them to you  Keeping your blood sugar in a healthy range, if you have diabetes  Changing your diet, if your doctor or nurse says you should  Quitting smoking, if you smoke  Losing weight, if you are overweight  Avoiding medicines known as "nonsteroidal antiinflammatory drugs," or NSAIDs. These medicines include ibuprofen (sample brand names: Advil, Motrin) and naproxen (sample brand name: Aleve). Check with your doctor, nurse, or kidney specialist before starting any new medicines - even over-the-counter ones.  What " "are the treatments for CKD? -- People in the early stages of CKD can take medicines to keep the disease from getting worse. For example, many people with CKD should take medicines known as "ACE inhibitors" or "angiotensin receptor blockers." If your doctor or nurse prescribes these medicines, it is very important that you take them every day as directed. If they cause side effects or cost too much, speak to your doctor or nurse about it. He or she might have solutions to offer.  What happens if my kidneys stop working completely? -- If your kidneys stop working completely, you can choose between 3 different treatments to take over the job of your kidneys. Your choices are described below.  You can have kidney transplant surgery. That way, the new kidney can do the job of your own kidneys. If you have a kidney transplant, you will need to take medicines for the rest of your life to keep your body from reacting badly to the new kidney. (You only need 1 kidney to live.)  You can have your blood filtered by a machine. This treatment is called "hemodialysis," but many people call it just "dialysis." If you choose this approach, you will need to be hooked up to the machine at least 3 times a week for a few hours for the rest of your life. Before you start, you will also need to have surgery to prepare a blood vessel for attachment to the machine.  You can learn to use a special fluid that has to be piped in and out of your belly every day. This treatment is called "peritoneal dialysis." If you choose this type of dialysis, you will need surgery to have a tube implanted in your belly. Then you will have to learn how to pipe the fluid in and out through that tube.  How do I choose between the different treatment options? -- You and your doctor will need to work together to find a treatment that's right for you. Kidney transplant surgery is usually the best option for most people. But often there are no kidneys available for " transplant.  Ask your doctor to explain all of your options and how they might work for you. Then talk openly with him or her about how you feel about all of the options. You might even decide that you do not want any treatment. That is your choice.  All topics are updated as new evidence becomes available and our peer review process is complete.  This topic retrieved from [employer] on: Sep 21, 2021.  Topic 49275 Version 22.0  Release: 29.4.2 - C29.263  © 2021 UpToDate, Inc. and/or its affiliates. All rights reserved.  figure 1: Anatomy of the urinary tract     Urine is made by the kidneys. It passes from the kidneys into the bladder through two tubes called the ureters. Then it leaves the bladder through another tube called the urethra.  Graphic 02144 Version 7.0    Consumer Information Use and Disclaimer   This information is not specific medical advice and does not replace information you receive from your health care provider. This is only a brief summary of general information. It does NOT include all information about conditions, illnesses, injuries, tests, procedures, treatments, therapies, discharge instructions or life-style choices that may apply to you. You must talk with your health care provider for complete information about your health and treatment options. This information should not be used to decide whether or not to accept your health care provider's advice, instructions or recommendations. Only your health care provider has the knowledge and training to provide advice that is right for you. The use of this information is governed by the [employer] End User License Agreement, available at https://www.Contraqer.Evolv/en/solutions/gauzz/about/lisa.The use of [employer] content is governed by the [employer] Terms of Use. ©2021 UpToDate, Inc. All rights reserved.  Copyright   © 2021 UpToDate, Inc. and/or its affiliates. All rights reserved.    Patient Education       Diet and Health   The Basics  "  Written by the doctors and editors at [facility]   Why is it important to eat a healthy diet? -- It's important to eat a healthy diet because eating the right foods can keep you healthy now and later on in life.  Which foods are especially healthy? -- Foods that are especially healthy include:  Fruits and vegetables - Eating a diet with lots of fruits and vegetables can help prevent heart disease and strokes. It might also help prevent certain types of cancers. Try to eat fruits and vegetables at each meal and also for snacks. If you don't have fresh fruits and vegetables available, you can eat frozen or canned ones instead. Doctors recommend eating at least 2 1/2 servings of vegetables and 2 servings of fruits each day.  Foods with fiber - Eating foods with a lot of fiber can help prevent heart disease and strokes. If you have type 2 diabetes, it can also help control your blood sugar. Foods that have a lot of fiber include vegetables, fruits, beans, nuts, oatmeal, and whole grain breads and cereals. You can tell how much fiber is in a food by reading the nutrition label (figure 1). Doctors recommend eating 25 to 36 grams of fiber each day.  Foods with folate (also called folic acid) - Folate is a vitamin that is important for pregnant people, since it helps prevent certain birth defects. Anyone who could get pregnant should get at least 400 micrograms of folic acid daily, whether or not they are actively trying to get pregnant. Folate is found in many breakfast cereals, oranges, orange juice, and green leafy vegetables.  Foods with calcium and vitamin D - Babies, children, and adults need calcium and vitamin D to help keep their bones strong. Adults also need calcium and vitamin D to help prevent osteoporosis. Osteoporosis is a condition that causes bones to get "thin" and break more easily than usual. Different foods and drinks have calcium and vitamin D in them (figure 2). People who don't get enough calcium and " "vitamin D in their diet might need to take a supplement.  Foods with protein - Protein helps your muscles stay strong. Healthy foods with a lot of protein include chicken, fish, eggs, beans, nuts, and soy products. Red meat also has a lot of protein, but it also contains fats, which can be unhealthy.  Some experts recommend a "Mediterranean diet." This involves eating a lot of fruits, vegetables, nuts, whole grains, and olive oil. It also includes some fish, poultry, and dairy products, but not a lot of red meat. Eating this way can help your overall health, and might even lower your risk of having a stroke.  What foods should I avoid or limit? -- To eat a healthy diet, there are some things you should avoid or limit. They include:   Fats - There are different types of fats. Some types of fats are better for your body than others.  Trans fats are especially unhealthy. They are found in margarines, many fast foods, and some store-bought baked goods. Trans fats can raise your cholesterol level and your increase your chance of getting heart disease. You should avoid eating foods with these types of fats.  The type of "polyunsaturated" fats found in fish seems to be healthy and can reduce your chance of getting heart disease. Other polyunsaturated fats might also be good for your health. When you cook, it's best to use oils with healthier fats, such as olive oil and canola oil.  Sugar - To have a healthy diet, it's important to limit or avoid sugar, sweets, and refined grains. Refined grains are found in white bread, white rice, most forms of pasta, and most packaged "snack" foods. Whole grains, such as whole-wheat bread and brown rice, have more fiber and are better for your health.  Avoiding sugar-sweetened beverages, like soda and sports drinks, can also help improve your health.  Red meat - Studies have shown that eating a lot of red meat can increase your risk of certain health problems, including heart disease and " "cancer. You should limit the amount of red meat that you eat.  Can I drink alcohol as part of a healthy diet? -- People who drink a small amount of alcohol each day might have a lower chance of getting heart disease. But drinking alcohol can lead to problems. For example, it can raise a person's chances of getting liver disease and certain types of cancers. In women, even 1 drink a day can increase the risk of getting breast cancer.  Most doctors recommend that adult women not have more than 1 drink a day and that adult men not have more than 2 drinks a day. The limits are different because most women's bodies take longer to break down alcohol.  How many calories do I need each day? -- The number of calories you need each day depends on your weight, height, age, sex, and how active you are.  Your doctor or nurse can tell you how many calories you should eat each day. If you are trying to lose weight, you should eat fewer calories each day.  What if I have questions? -- If you have questions about which foods you should or should not eat, ask your doctor or nurse. The right diet for you will depend, in part, on your health and any medical conditions you have.  All topics are updated as new evidence becomes available and our peer review process is complete.  This topic retrieved from [employer] on: Sep 21, 2021.  Topic 45946 Version 20.0  Release: 29.4.2 - C29.263  © 2021 UpToDate, Inc. and/or its affiliates. All rights reserved.  figure 1: Nutrition label - fiber     This is an example of a nutrition label. To figure out how much fiber is in a food, look for the line that says "Dietary Fiber." It's also important to look at the serving size. This food has 7 grams of fiber in each serving, and each serving is 1 cup.  Graphic 95231 Version 7.0    figure 2: Foods and drinks with calcium and vitamin D     Foods rich in calcium include ice cream, soy milk, breads, kale, broccoli, milk, cheese, cottage cheese, almonds, " "yogurt, ready-to-eat cereals, beans, and tofu. Foods rich in vitamin D include milk, fortified plant-based "milks" (soy, almond), canned tuna fish, cod liver oil, yogurt, ready-to-eat-cereals, cooked salmon, canned sardines, mackerel, and eggs. Some of these foods are rich in both.  [employer] 05937 Version 4.0    Consumer Information Use and Disclaimer   This information is not specific medical advice and does not replace information you receive from your health care provider. This is only a brief summary of general information. It does NOT include all information about conditions, illnesses, injuries, tests, procedures, treatments, therapies, discharge instructions or life-style choices that may apply to you. You must talk with your health care provider for complete information about your health and treatment options. This information should not be used to decide whether or not to accept your health care provider's advice, instructions or recommendations. Only your health care provider has the knowledge and training to provide advice that is right for you. The use of this information is governed by the [employer] End User License Agreement, available at https://www.[employer]/en/solutions/Akademos/about/lisa.The use of [employer] content is governed by the [employer] Terms of Use. ©2021 [employer] Inc. All rights reserved.  Copyright   © 2021 UpToDate, Inc. and/or its affiliates. All rights reserved.    "

## 2023-06-21 NOTE — ASSESSMENT & PLAN NOTE
Pt has taken Xanax in the past with improvement of anxiety. Xanax 0.25mg tablet- take one prn for anxiety. RTC 1 month for evaluation.      Reviewed treatment plan and medication side effects/risk/benefits/directions on taking medications. Instructed patient it could take up to 3- 4 weeks before they see full benefit of medication. Patient denies suicidal or homicidal ideations. Should these symptoms develop, encouraged to proceed to the closest ER for additional evaluation and treatment. Instructed patient to return to clinic in 3-4 weeks to discuss effectiveness of drug and any side effects. Patient verbalized understanding of treatment plan and denies any questions.

## 2023-06-21 NOTE — ASSESSMENT & PLAN NOTE
Start Elavil 25mg one tablet at night for prevention of migraines. Continue Topamax and Imitrex at current regimen. Follow up in 1 month to evaluate effectiveness of medication

## 2023-06-22 ENCOUNTER — TELEPHONE (OUTPATIENT)
Dept: FAMILY MEDICINE | Facility: CLINIC | Age: 48
End: 2023-06-22
Payer: COMMERCIAL

## 2023-06-22 ENCOUNTER — PATIENT OUTREACH (OUTPATIENT)
Dept: ADMINISTRATIVE | Facility: HOSPITAL | Age: 48
End: 2023-06-22

## 2023-06-22 DIAGNOSIS — E78.5 HYPERLIPIDEMIA, UNSPECIFIED HYPERLIPIDEMIA TYPE: Primary | ICD-10-CM

## 2023-06-22 RX ORDER — ATORVASTATIN CALCIUM 20 MG/1
20 TABLET, FILM COATED ORAL DAILY
Qty: 90 TABLET | Refills: 1 | Status: SHIPPED | OUTPATIENT
Start: 2023-06-22 | End: 2024-01-08 | Stop reason: SDUPTHER

## 2023-06-22 NOTE — PROGRESS NOTES
Post visit Population Health review of encounter with date of service  6/22 with Oanh.  All required HY components in encounter.    Added myself to catrachito

## 2023-07-11 ENCOUNTER — HOSPITAL ENCOUNTER (OUTPATIENT)
Dept: RADIOLOGY | Facility: HOSPITAL | Age: 48
Discharge: HOME | End: 2023-07-11
Payer: COMMERCIAL

## 2023-07-11 VITALS — HEIGHT: 63 IN | BODY MASS INDEX: 28 KG/M2 | WEIGHT: 158 LBS

## 2023-07-11 DIAGNOSIS — Z12.39 ENCOUNTER FOR SCREENING FOR MALIGNANT NEOPLASM OF BREAST, UNSPECIFIED SCREENING MODALITY: ICD-10-CM

## 2023-07-11 PROCEDURE — 77067 SCR MAMMO BI INCL CAD: CPT | Mod: TC

## 2023-09-25 PROBLEM — Z13.220 SCREENING FOR LIPOID DISORDERS: Status: RESOLVED | Noted: 2023-06-21 | Resolved: 2023-09-25

## 2023-10-03 ENCOUNTER — OFFICE VISIT (OUTPATIENT)
Dept: FAMILY MEDICINE | Facility: CLINIC | Age: 48
End: 2023-10-03
Payer: COMMERCIAL

## 2023-10-03 VITALS
OXYGEN SATURATION: 100 % | SYSTOLIC BLOOD PRESSURE: 107 MMHG | HEIGHT: 63 IN | WEIGHT: 158.81 LBS | DIASTOLIC BLOOD PRESSURE: 73 MMHG | TEMPERATURE: 98 F | RESPIRATION RATE: 12 BRPM | BODY MASS INDEX: 28.14 KG/M2 | HEART RATE: 54 BPM

## 2023-10-03 DIAGNOSIS — K21.9 GASTROESOPHAGEAL REFLUX DISEASE WITHOUT ESOPHAGITIS: ICD-10-CM

## 2023-10-03 DIAGNOSIS — E78.5 HYPERLIPIDEMIA, UNSPECIFIED HYPERLIPIDEMIA TYPE: Primary | ICD-10-CM

## 2023-10-03 DIAGNOSIS — E53.8 VITAMIN B 12 DEFICIENCY: ICD-10-CM

## 2023-10-03 DIAGNOSIS — E66.9 OBESITY (BMI 30-39.9): ICD-10-CM

## 2023-10-03 DIAGNOSIS — G43.011 INTRACTABLE MIGRAINE WITHOUT AURA AND WITH STATUS MIGRAINOSUS: ICD-10-CM

## 2023-10-03 DIAGNOSIS — E55.9 VITAMIN D DEFICIENCY: ICD-10-CM

## 2023-10-03 LAB
25(OH)D3 SERPL-MCNC: 20.7 NG/ML
ALBUMIN SERPL BCP-MCNC: 4 G/DL (ref 3.5–5)
ALBUMIN/GLOB SERPL: 0.9 {RATIO}
ALP SERPL-CCNC: 92 U/L (ref 39–100)
ALT SERPL W P-5'-P-CCNC: 32 U/L (ref 13–56)
ANION GAP SERPL CALCULATED.3IONS-SCNC: 11 MMOL/L (ref 7–16)
AST SERPL W P-5'-P-CCNC: 14 U/L (ref 15–37)
BASOPHILS # BLD AUTO: 0.03 K/UL (ref 0–0.2)
BASOPHILS NFR BLD AUTO: 0.6 % (ref 0–1)
BILIRUB SERPL-MCNC: 0.3 MG/DL (ref ?–1.2)
BUN SERPL-MCNC: 15 MG/DL (ref 7–18)
BUN/CREAT SERPL: 15 (ref 6–20)
CALCIUM SERPL-MCNC: 9.1 MG/DL (ref 8.5–10.1)
CHLORIDE SERPL-SCNC: 108 MMOL/L (ref 98–107)
CHOLEST SERPL-MCNC: 217 MG/DL (ref 0–200)
CHOLEST/HDLC SERPL: 3.5 {RATIO}
CO2 SERPL-SCNC: 24 MMOL/L (ref 21–32)
CREAT SERPL-MCNC: 0.97 MG/DL (ref 0.55–1.02)
DIFFERENTIAL METHOD BLD: ABNORMAL
EGFR (NO RACE VARIABLE) (RUSH/TITUS): 72 ML/MIN/1.73M2
EOSINOPHIL # BLD AUTO: 0.11 K/UL (ref 0–0.5)
EOSINOPHIL NFR BLD AUTO: 2.3 % (ref 1–4)
ERYTHROCYTE [DISTWIDTH] IN BLOOD BY AUTOMATED COUNT: 12.6 % (ref 11.5–14.5)
FOLATE SERPL-MCNC: 12.4 NG/ML (ref 3.1–17.5)
GLOBULIN SER-MCNC: 4.3 G/DL (ref 2–4)
GLUCOSE SERPL-MCNC: 74 MG/DL (ref 74–106)
HCT VFR BLD AUTO: 43 % (ref 38–47)
HDLC SERPL-MCNC: 62 MG/DL (ref 40–60)
HGB BLD-MCNC: 14.3 G/DL (ref 12–16)
IMM GRANULOCYTES # BLD AUTO: 0.01 K/UL (ref 0–0.04)
IMM GRANULOCYTES NFR BLD: 0.2 % (ref 0–0.4)
LDLC SERPL CALC-MCNC: 117 MG/DL
LDLC/HDLC SERPL: 1.9 {RATIO}
LYMPHOCYTES # BLD AUTO: 1.5 K/UL (ref 1–4.8)
LYMPHOCYTES NFR BLD AUTO: 31.6 % (ref 27–41)
MCH RBC QN AUTO: 32.1 PG (ref 27–31)
MCHC RBC AUTO-ENTMCNC: 33.3 G/DL (ref 32–36)
MCV RBC AUTO: 96.6 FL (ref 80–96)
MONOCYTES # BLD AUTO: 0.3 K/UL (ref 0–0.8)
MONOCYTES NFR BLD AUTO: 6.3 % (ref 2–6)
MPC BLD CALC-MCNC: 9.7 FL (ref 9.4–12.4)
NEUTROPHILS # BLD AUTO: 2.79 K/UL (ref 1.8–7.7)
NEUTROPHILS NFR BLD AUTO: 59 % (ref 53–65)
NONHDLC SERPL-MCNC: 155 MG/DL
NRBC # BLD AUTO: 0 X10E3/UL
NRBC, AUTO (.00): 0 %
PLATELET # BLD AUTO: 294 K/UL (ref 150–400)
POTASSIUM SERPL-SCNC: 4.5 MMOL/L (ref 3.5–5.1)
PROT SERPL-MCNC: 8.3 G/DL (ref 6.4–8.2)
RBC # BLD AUTO: 4.45 M/UL (ref 4.2–5.4)
SODIUM SERPL-SCNC: 138 MMOL/L (ref 136–145)
T4 FREE SERPL-MCNC: 0.92 NG/DL (ref 0.76–1.46)
TRIGL SERPL-MCNC: 190 MG/DL (ref 35–150)
TSH SERPL DL<=0.005 MIU/L-ACNC: 2.39 UIU/ML (ref 0.36–3.74)
VIT B12 SERPL-MCNC: 303 PG/ML (ref 193–986)
VLDLC SERPL-MCNC: 38 MG/DL
WBC # BLD AUTO: 4.74 K/UL (ref 4.5–11)

## 2023-10-03 PROCEDURE — 84439 T4, FREE: ICD-10-PCS | Mod: ,,, | Performed by: CLINICAL MEDICAL LABORATORY

## 2023-10-03 PROCEDURE — 84439 ASSAY OF FREE THYROXINE: CPT | Mod: ,,, | Performed by: CLINICAL MEDICAL LABORATORY

## 2023-10-03 PROCEDURE — 1160F RVW MEDS BY RX/DR IN RCRD: CPT | Mod: ,,,

## 2023-10-03 PROCEDURE — 1159F PR MEDICATION LIST DOCUMENTED IN MEDICAL RECORD: ICD-10-PCS | Mod: ,,,

## 2023-10-03 PROCEDURE — 82607 VITAMIN B-12: CPT | Mod: ,,, | Performed by: CLINICAL MEDICAL LABORATORY

## 2023-10-03 PROCEDURE — 80061 LIPID PANEL: CPT | Mod: ,,, | Performed by: CLINICAL MEDICAL LABORATORY

## 2023-10-03 PROCEDURE — 99214 PR OFFICE/OUTPT VISIT, EST, LEVL IV, 30-39 MIN: ICD-10-PCS | Mod: ,,,

## 2023-10-03 PROCEDURE — 80061 LIPID PANEL: ICD-10-PCS | Mod: ,,, | Performed by: CLINICAL MEDICAL LABORATORY

## 2023-10-03 PROCEDURE — 80050 GENERAL HEALTH PANEL: CPT | Mod: ,,, | Performed by: CLINICAL MEDICAL LABORATORY

## 2023-10-03 PROCEDURE — 1159F MED LIST DOCD IN RCRD: CPT | Mod: ,,,

## 2023-10-03 PROCEDURE — 3008F PR BODY MASS INDEX (BMI) DOCUMENTED: ICD-10-PCS | Mod: ,,,

## 2023-10-03 PROCEDURE — 3078F DIAST BP <80 MM HG: CPT | Mod: ,,,

## 2023-10-03 PROCEDURE — 3074F PR MOST RECENT SYSTOLIC BLOOD PRESSURE < 130 MM HG: ICD-10-PCS | Mod: ,,,

## 2023-10-03 PROCEDURE — 99214 OFFICE O/P EST MOD 30 MIN: CPT | Mod: ,,,

## 2023-10-03 PROCEDURE — 82306 VITAMIN D: ICD-10-PCS | Mod: ,,, | Performed by: CLINICAL MEDICAL LABORATORY

## 2023-10-03 PROCEDURE — 82306 VITAMIN D 25 HYDROXY: CPT | Mod: ,,, | Performed by: CLINICAL MEDICAL LABORATORY

## 2023-10-03 PROCEDURE — 1160F PR REVIEW ALL MEDS BY PRESCRIBER/CLIN PHARMACIST DOCUMENTED: ICD-10-PCS | Mod: ,,,

## 2023-10-03 PROCEDURE — 82607 VITAMIN B12/FOLATE, SERUM PANEL: ICD-10-PCS | Mod: ,,, | Performed by: CLINICAL MEDICAL LABORATORY

## 2023-10-03 PROCEDURE — 86038 ANA EIA W/REFLEX DSDNA/ENA: ICD-10-PCS | Mod: ,,, | Performed by: CLINICAL MEDICAL LABORATORY

## 2023-10-03 PROCEDURE — 82746 ASSAY OF FOLIC ACID SERUM: CPT | Mod: ,,, | Performed by: CLINICAL MEDICAL LABORATORY

## 2023-10-03 PROCEDURE — 3078F PR MOST RECENT DIASTOLIC BLOOD PRESSURE < 80 MM HG: ICD-10-PCS | Mod: ,,,

## 2023-10-03 PROCEDURE — 86038 ANTINUCLEAR ANTIBODIES: CPT | Mod: ,,, | Performed by: CLINICAL MEDICAL LABORATORY

## 2023-10-03 PROCEDURE — 3008F BODY MASS INDEX DOCD: CPT | Mod: ,,,

## 2023-10-03 PROCEDURE — 82746 VITAMIN B12/FOLATE, SERUM PANEL: ICD-10-PCS | Mod: ,,, | Performed by: CLINICAL MEDICAL LABORATORY

## 2023-10-03 PROCEDURE — 3074F SYST BP LT 130 MM HG: CPT | Mod: ,,,

## 2023-10-03 PROCEDURE — 80050 PR  GENERAL HEALTH PANEL: ICD-10-PCS | Mod: ,,, | Performed by: CLINICAL MEDICAL LABORATORY

## 2023-10-03 RX ORDER — PANTOPRAZOLE SODIUM 40 MG/1
40 TABLET, DELAYED RELEASE ORAL DAILY
Qty: 30 TABLET | Refills: 2 | Status: SHIPPED | OUTPATIENT
Start: 2023-10-03 | End: 2024-01-08 | Stop reason: SDUPTHER

## 2023-10-03 RX ORDER — CYCLOBENZAPRINE HCL 5 MG
5 TABLET ORAL NIGHTLY PRN
COMMUNITY
Start: 2023-07-18 | End: 2024-01-08

## 2023-10-03 NOTE — PROGRESS NOTES
ELIZABETH JOHNSON   [place] [id number]  [place]  [address]      PATIENT NAME: Rubia Contreras  : 1975  DATE: 10/3/23  MRN: [id number]      Billing Provider: ELIZABETH JOHNSON  Level of Service:   Patient PCP Information       Provider PCP Type    Jimena Montez NP General            Reason for Visit / Chief Complaint: Follow-up (3 months follow up. ) and GI Problem (Pt c/o having a stomach pain. Pt reports It feel like she has heart burn or acid reflux and pt also  she cant use the bathroom for several days pt says she has to take medication for her to use the bathroom. )         History of Present Illness / Problem Focused Workflow     Rubia Contreras presents to the clinic with Follow-up (3 months follow up. ) and GI Problem (Pt c/o having a stomach pain. Pt reports It feel like she has heart burn or acid reflux and pt also  she cant use the bathroom for several days pt says she has to take medication for her to use the bathroom. 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  reports that she has been smoking vaping with nicotine and cigarettes. She started smoking about 30 years ago. She has a 12.5 pack-year smoking history. She has been exposed to tobacco smoke. She has never used smokeless tobacco. She reports current alcohol use of about 7.0 standard drinks of alcohol  "per week. She reports that she does not currently use drugs after having used the following drugs: Hydrocodone.    Family History  Ms.'s family history includes Atrial fibrillation in her father; Cerebral palsy in her daughter; Cirrhosis in her mother; Hypertension in her father; Hypothyroidism in her mother; No Known Problems in her daughter and son; Pancreatitis in her brother.    Medications and Allergies     Medications  Outpatient Medications Marked as Taking for the 10/3/23 encounter (Office Visit) with Jaron Hugo FNP   Medication Sig Dispense Refill    ALPRAZolam (XANAX) 0.25 MG tablet Take 1 tablet (0.25 mg total) by mouth 3 (three) times daily. 45 tablet 0    atorvastatin (LIPITOR) 20 MG tablet Take 1 tablet (20 mg total) by mouth once daily. 90 tablet 1    cyclobenzaprine (FLEXERIL) 5 MG tablet Take 5 mg by mouth nightly as needed.      famotidine (PEPCID) 20 MG tablet Take 20 mg by mouth 2 (two) times daily.      sumatriptan (IMITREX) 100 MG tablet Take 1 tablet (100 mg total) by mouth as needed for Migraine. 30 tablet 5    topiramate (TOPAMAX) 100 MG tablet Take 1 tablet (100 mg total) by mouth 2 (two) times a day. 60 tablet 5       Allergies  Review of patient's allergies indicates:  No Known Allergies    Physical Examination     Vitals:    10/03/23 0955   BP: 107/73   Pulse: (!) 54   Resp: 12   Temp: 98.2 °F (36.8 °C)     Physical Exam          No results found for: "WBC", "HGB", "HCT", "MCV", "PLT"     CMP  No results found for: "NA", "K", "CL", "CO2", "GLU", "BUN", "CREATININE", "CALCIUM", "PROT", "ALBUMIN", "BILITOT", "ALKPHOS", "AST", "ALT", "ANIONGAP", "EGFRNORACEVR"  Procedures   Assessment and Plan (including Health Maintenance)   :    Plan:           Problem List Items Addressed This Visit    None      Health Maintenance Topics with due status: Not Due       Topic Last Completion Date    Colorectal Cancer Screening 05/17/2023    Lipid Panel 06/21/2023    Mammogram 07/11/2023 "       Future Appointments   Date Time Provider Department Center   6/25/2024  8:30 AM Jimena Montez NP [facility]   7/17/2024 10:45 AM [facility]        Health Maintenance Due   Topic Date Due    Hepatitis C Screening  Never done    Pneumococcal Vaccines (Age 0-64) (1 - PCV) Never done    Hemoglobin A1c (Diabetic Prevention Screening)  Never done    Influenza Vaccine (1) 09/01/2023        No follow-ups on file.     Signature:  ELIZABETH JOHNSON  [address] [place]  [address]    Date of encounter: 10/3/23

## 2023-10-04 DIAGNOSIS — E55.9 VITAMIN D DEFICIENCY: Primary | ICD-10-CM

## 2023-10-04 RX ORDER — ERGOCALCIFEROL 1.25 MG/1
50000 CAPSULE ORAL
Qty: 12 CAPSULE | Refills: 0 | Status: SHIPPED | OUTPATIENT
Start: 2023-10-04

## 2023-10-04 RX ORDER — CYANOCOBALAMIN 1000 UG/ML
INJECTION, SOLUTION INTRAMUSCULAR; SUBCUTANEOUS
Qty: 10 ML | Refills: 0 | Status: SHIPPED | OUTPATIENT
Start: 2023-10-04

## 2023-10-05 PROBLEM — K21.9 GASTROESOPHAGEAL REFLUX DISEASE WITHOUT ESOPHAGITIS: Status: ACTIVE | Noted: 2023-10-05

## 2023-10-05 PROBLEM — E78.5 HYPERLIPIDEMIA: Status: ACTIVE | Noted: 2023-10-05

## 2023-10-05 PROBLEM — E66.9 OBESITY (BMI 30-39.9): Status: ACTIVE | Noted: 2023-10-05

## 2023-10-05 PROBLEM — E53.8 VITAMIN B 12 DEFICIENCY: Status: ACTIVE | Noted: 2023-10-05

## 2023-10-05 LAB — ANA SER QL: NEGATIVE

## 2023-10-05 RX ORDER — PHENTERMINE HYDROCHLORIDE 37.5 MG/1
37.5 TABLET ORAL
Qty: 30 TABLET | Refills: 0 | Status: SHIPPED | OUTPATIENT
Start: 2023-10-05 | End: 2023-11-04

## 2023-10-24 ENCOUNTER — OFFICE VISIT (OUTPATIENT)
Dept: FAMILY MEDICINE | Facility: CLINIC | Age: 48
End: 2023-10-24
Payer: COMMERCIAL

## 2023-10-24 VITALS
SYSTOLIC BLOOD PRESSURE: 120 MMHG | RESPIRATION RATE: 18 BRPM | TEMPERATURE: 98 F | HEIGHT: 63 IN | OXYGEN SATURATION: 99 % | DIASTOLIC BLOOD PRESSURE: 80 MMHG | BODY MASS INDEX: 28.59 KG/M2 | HEART RATE: 75 BPM | WEIGHT: 161.38 LBS

## 2023-10-24 DIAGNOSIS — R10.10 UPPER ABDOMINAL PAIN: Primary | ICD-10-CM

## 2023-10-24 DIAGNOSIS — K59.00 CONSTIPATION, UNSPECIFIED CONSTIPATION TYPE: ICD-10-CM

## 2023-10-24 PROCEDURE — 1160F RVW MEDS BY RX/DR IN RCRD: CPT | Mod: ,,,

## 2023-10-24 PROCEDURE — 1160F PR REVIEW ALL MEDS BY PRESCRIBER/CLIN PHARMACIST DOCUMENTED: ICD-10-PCS | Mod: ,,,

## 2023-10-24 PROCEDURE — 3079F PR MOST RECENT DIASTOLIC BLOOD PRESSURE 80-89 MM HG: ICD-10-PCS | Mod: ,,,

## 2023-10-24 PROCEDURE — 3074F SYST BP LT 130 MM HG: CPT | Mod: ,,,

## 2023-10-24 PROCEDURE — 99214 OFFICE O/P EST MOD 30 MIN: CPT | Mod: ,,,

## 2023-10-24 PROCEDURE — 3079F DIAST BP 80-89 MM HG: CPT | Mod: ,,,

## 2023-10-24 PROCEDURE — 3008F PR BODY MASS INDEX (BMI) DOCUMENTED: ICD-10-PCS | Mod: ,,,

## 2023-10-24 PROCEDURE — 3074F PR MOST RECENT SYSTOLIC BLOOD PRESSURE < 130 MM HG: ICD-10-PCS | Mod: ,,,

## 2023-10-24 PROCEDURE — 1159F MED LIST DOCD IN RCRD: CPT | Mod: ,,,

## 2023-10-24 PROCEDURE — 99214 PR OFFICE/OUTPT VISIT, EST, LEVL IV, 30-39 MIN: ICD-10-PCS | Mod: ,,,

## 2023-10-24 PROCEDURE — 3008F BODY MASS INDEX DOCD: CPT | Mod: ,,,

## 2023-10-24 PROCEDURE — 1159F PR MEDICATION LIST DOCUMENTED IN MEDICAL RECORD: ICD-10-PCS | Mod: ,,,

## 2023-10-24 RX ORDER — POLYETHYLENE GLYCOL 3350 17 G/17G
17 POWDER, FOR SOLUTION ORAL DAILY
Qty: 30 EACH | Refills: 1 | Status: SHIPPED | OUTPATIENT
Start: 2023-10-24 | End: 2024-01-08

## 2023-10-24 NOTE — PROGRESS NOTES
ELIZABETH JOHNSON   [place] [address]  [place]  [address]      PATIENT NAME: Rubia Contreras  : 1975  DATE: 10/24/23  MRN: [id number]      Billing Provider: ELIZABETH JOHNSON  Level of Service: GA OFFICE/OUTPT VISIT, EST, LEVL IV, 30-39 MIN  Patient PCP Information       Provider PCP Type    ELIZABETH JOHNSON General            Reason for Visit / Chief Complaint: Constipation (For the past 2-3 months she has not been able to have a bowel movement without taking a laxative.  She has gone as long as 8 days with a bowel movement.  After she takes a laxative, her bowel movements are like diarrhea.  The stool is not hard or formed.  She has been having trouble with bloating.  After she has a bowel movement, a couple of days later she starts feeling pressure build up at the top of her abdomen but the lower part of her abdomen remains soft.)         History of Present Illness / Problem Focused Workflow     Rubia Contreras presents to the clinic with Constipation (For the past 2-3 months she has not been able to have a bowel movement without taking a laxative.  She has gone as long as 8 days with a bowel movement.  After she takes a laxative, her bowel movements are like diarrhea.  The stool is not hard or formed.  She has been having trouble with bloating.  After she has a bowel movement, a couple of days later she starts feeling pressure build up at the top of her abdomen but the lower part of her abdomen remains soft.)     49 y/o female presents to clinic with complaints of upper abdominal pain and constipation. Pt states she has hx of alternating constipation and diarrhea for the last several years. States she has been having problems with constipation for the last couple months as long as 8 days in between bowel movements. States she takes a laxative but it has not helped. She is also complaining of severe bloating the last few days.     Review of Systems      @Review of Systems   Constitutional:  Negative for chills, fatigue and fever.   HENT:  Negative for nasal congestion, ear pain, sinus pressure/congestion and sore throat.    Respiratory:  Negative for cough, chest tightness, shortness of breath and wheezing.    Cardiovascular:  Negative for chest pain and palpitations.   Gastrointestinal:  Positive for abdominal pain and constipation. Negative for nausea and vomiting.   Musculoskeletal:  Negative for back pain and myalgias.   Neurological:  Negative for dizziness, weakness, light-headedness and headaches.   Psychiatric/Behavioral:  Negative for suicidal ideas.        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  reports that she has been smoking cigarettes and vaping with nicotine. She started smoking about 30 years ago. She has a 12.5 pack-year smoking history. She has been exposed to tobacco smoke. She has never used smokeless tobacco. She reports current alcohol use of about 7.0 standard drinks of alcohol per week. She reports that she does not currently use drugs after having used the following drugs: Hydrocodone.    Family History  Ms.'s family history includes Atrial fibrillation in her father; Cerebral palsy in her daughter; Cirrhosis in her mother; Hypertension in her father; Hypothyroidism in her mother; No Known Problems in her daughter and son; Pancreatitis in her brother.    Medications and Allergies      Medications  Outpatient Medications Marked as Taking for the 10/24/23 encounter (Office Visit) with Jaron Hugo FNP   Medication Sig Dispense Refill    ALPRAZolam (XANAX) 0.25 MG tablet Take 1 tablet (0.25 mg total) by mouth 3 (three) times daily. 45 tablet 0    atorvastatin (LIPITOR) 20 MG tablet Take 1 tablet (20 mg total) by mouth once daily. 90 tablet 1    cyanocobalamin 1,000 mcg/mL injection Take 1 ml subcutaneous injection weekly for 6 weeks and then take 1 ml subcutaneous injection once a month for 4 months. 10 mL 0    cyclobenzaprine (FLEXERIL) 5 MG tablet Take 5 mg by mouth nightly as needed.      ergocalciferol (ERGOCALCIFEROL) 50,000 unit Cap Take 1 capsule (50,000 Units total) by mouth every 7 days. 12 capsule 0    pantoprazole (PROTONIX) 40 MG tablet Take 1 tablet (40 mg total) by mouth once daily. 30 tablet 2    phentermine (ADIPEX-P) 37.5 mg tablet Take 1 tablet (37.5 mg total) by mouth before breakfast. 30 tablet 0    sumatriptan (IMITREX) 100 MG tablet Take 1 tablet (100 mg total) by mouth as needed for Migraine. 30 tablet 5    topiramate (TOPAMAX) 100 MG tablet Take 1 tablet (100 mg total) by mouth 2 (two) times a day. 60 tablet 5       Allergies  Review of patient's allergies indicates:  No Known Allergies    Physical Examination     Vitals:    10/24/23 0916   BP: 120/80   Pulse: 75   Resp: 18   Temp: 98.3 °F (36.8 °C)     Physical Exam  Vitals and nursing note reviewed.   Constitutional:       General: She is awake.      Appearance: Normal appearance. She is overweight.   HENT:      Head: Normocephalic.      Right Ear: Tympanic membrane and ear canal normal.      Left Ear: Tympanic membrane and ear canal normal.      Nose: Nose normal.      Mouth/Throat:      Lips: Pink.      Mouth: Mucous membranes are moist.      Pharynx: Oropharynx is clear. Uvula midline.   Eyes:      Conjunctiva/sclera: Conjunctivae normal.      Pupils: Pupils are equal, round, and reactive to light.    Cardiovascular:      Rate and Rhythm: Normal rate and regular rhythm.      Heart sounds: Normal heart sounds, S1 normal and S2 normal.   Pulmonary:      Effort: No respiratory distress.      Breath sounds: Normal breath sounds. No decreased breath sounds, wheezing, rhonchi or rales.   Abdominal:      General: Bowel sounds are normal. There is no distension or abdominal bruit.      Palpations: Abdomen is soft. There is no mass or pulsatile mass.      Tenderness: There is generalized abdominal tenderness. There is no right CVA tenderness, left CVA tenderness, guarding or rebound. Negative signs include Guidry's sign, Rovsing's sign, McBurney's sign and psoas sign.      Hernia: No hernia is present.   Musculoskeletal:      Cervical back: Normal range of motion.   Skin:     General: Skin is warm.      Capillary Refill: Capillary refill takes less than 2 seconds.   Neurological:      Mental Status: She is alert and oriented to person, place, and time.   Psychiatric:         Attention and Perception: Attention normal.         Mood and Affect: Mood normal.         Speech: Speech normal.         Behavior: Behavior normal. Behavior is cooperative.         Thought Content: Thought content does not include homicidal or suicidal ideation. Thought content does not include homicidal or suicidal plan.               Lab Results   Component Value Date    WBC 4.74 10/03/2023    HGB 14.3 10/03/2023    HCT 43.0 10/03/2023    MCV 96.6 (H) 10/03/2023     10/03/2023        CMP  Sodium   Date Value Ref Range Status   10/03/2023 138 136 - 145 mmol/L Final     Potassium   Date Value Ref Range Status   10/03/2023 4.5 3.5 - 5.1 mmol/L Final     Chloride   Date Value Ref Range Status   10/03/2023 108 (H) 98 - 107 mmol/L Final     CO2   Date Value Ref Range Status   10/03/2023 24 21 - 32 mmol/L Final     Glucose   Date Value Ref Range Status   10/03/2023 74 74 - 106 mg/dL Final     BUN   Date Value Ref Range Status   10/03/2023 15 7 -  18 mg/dL Final     Creatinine   Date Value Ref Range Status   10/03/2023 0.97 0.55 - 1.02 mg/dL Final     Calcium   Date Value Ref Range Status   10/03/2023 9.1 8.5 - 10.1 mg/dL Final     Total Protein   Date Value Ref Range Status   10/03/2023 8.3 (H) 6.4 - 8.2 g/dL Final     Albumin   Date Value Ref Range Status   10/03/2023 4.0 3.5 - 5.0 g/dL Final     Bilirubin, Total   Date Value Ref Range Status   10/03/2023 0.3 >0.0 - 1.2 mg/dL Final     Alk Phos   Date Value Ref Range Status   10/03/2023 92 39 - 100 U/L Final     AST   Date Value Ref Range Status   10/03/2023 14 (L) 15 - 37 U/L Final     ALT   Date Value Ref Range Status   10/03/2023 32 13 - 56 U/L Final     Anion Gap   Date Value Ref Range Status   10/03/2023 11 7 - 16 mmol/L Final     eGFR   Date Value Ref Range Status   10/03/2023 72 >=60 mL/min/1.73m2 Final     Procedures   Assessment and Plan (including Health Maintenance)   :    Plan:           Problem List Items Addressed This Visit          GI    Upper abdominal pain - Primary    Current Assessment & Plan     Abdomin and pelvis CT with contrast to be scheduled and will call pt with results and any new orders with understanding voiced. Discussed with pt if pain becomes severe or any new symptoms then go to the ER with understanding voiced.          Relevant Orders    CT Abdomen Pelvis With Contrast    Constipation    Current Assessment & Plan     Discussed increasing fiber in diet, increase exercise, start Miralax daily.      Reviewed pathology of current symptoms, medication side effects/risk/benefits/directions on taking medications, and worsening or persistent symptoms that require follow up in next 2-3 days. Reviewed appropriate mixing and administration of Miralax. Give in beverage of choice. Increase water and fiber intake. All medication risks and benefits discussed with patient and all questions answered. Instructed patient to return to the clinic or go to the ER if symptoms persist or worsen.           Relevant Medications    polyethylene glycol (GLYCOLAX) 17 gram PwPk    Other Relevant Orders    CT Abdomen Pelvis With Contrast       Health Maintenance Topics with due status: Not Due       Topic Last Completion Date    Colorectal Cancer Screening 05/17/2023    Mammogram 07/11/2023    Lipid Panel 10/03/2023       Future Appointments   Date Time Provider Department Center   10/26/2023 10:30 AM [facility] CT1 [facility] [place]    6/25/2024  8:30 AM Jimena Montez, NP [facility] [place]   7/17/2024 10:45 AM [facility] MAM[facility]1 [facility] [place]        Health Maintenance Due   Topic Date Due    Hepatitis C Screening  Never done    Pneumococcal Vaccines (Age 0-64) (1 - PCV) Never done    Hemoglobin A1c (Diabetic Prevention Screening)  Never done    Influenza Vaccine (1) 09/01/2023        No follow-ups on file.     Signature:  ELIZABETH JOHNSON  [facility]  [id number] [place] [address], [place]  [address]    Date of encounter: 10/24/23

## 2023-10-25 PROBLEM — K59.00 CONSTIPATION: Status: ACTIVE | Noted: 2023-10-25

## 2023-10-25 PROBLEM — R10.10 UPPER ABDOMINAL PAIN: Status: ACTIVE | Noted: 2023-10-25

## 2023-10-25 NOTE — ASSESSMENT & PLAN NOTE
Discussed increasing fiber in diet, increase exercise, start Miralax daily.      Reviewed pathology of current symptoms, medication side effects/risk/benefits/directions on taking medications, and worsening or persistent symptoms that require follow up in next 2-3 days. Reviewed appropriate mixing and administration of Miralax. Give in beverage of choice. Increase water and fiber intake. All medication risks and benefits discussed with patient and all questions answered. Instructed patient to return to the clinic or go to the ER if symptoms persist or worsen.

## 2023-10-25 NOTE — ASSESSMENT & PLAN NOTE
Abdomin and pelvis CT with contrast to be scheduled and will call pt with results and any new orders with understanding voiced. Discussed with pt if pain becomes severe or any new symptoms then go to the ER with understanding voiced.

## 2023-10-26 ENCOUNTER — HOSPITAL ENCOUNTER (OUTPATIENT)
Dept: RADIOLOGY | Facility: HOSPITAL | Age: 48
Discharge: HOME | End: 2023-10-26
Payer: COMMERCIAL

## 2023-10-26 DIAGNOSIS — K59.00 CONSTIPATION, UNSPECIFIED CONSTIPATION TYPE: ICD-10-CM

## 2023-10-26 DIAGNOSIS — R10.10 UPPER ABDOMINAL PAIN: ICD-10-CM

## 2023-10-26 PROCEDURE — 25500020 PHARM REV CODE 255

## 2023-10-26 PROCEDURE — 74177 CT ABD & PELVIS W/CONTRAST: CPT | Mod: TC

## 2023-10-26 RX ADMIN — IOPAMIDOL 100 ML: 755 INJECTION, SOLUTION INTRAVENOUS at 10:10

## 2023-11-01 DIAGNOSIS — G43.011 INTRACTABLE MIGRAINE WITHOUT AURA AND WITH STATUS MIGRAINOSUS: ICD-10-CM

## 2023-11-01 RX ORDER — AMITRIPTYLINE HYDROCHLORIDE 25 MG/1
25 TABLET, FILM COATED ORAL NIGHTLY PRN
Qty: 90 TABLET | Refills: 0 | Status: SHIPPED | OUTPATIENT
Start: 2023-11-01 | End: 2023-11-20

## 2023-11-01 NOTE — PROGRESS NOTES
Hilary Lopez RN   [facility]  [address]  [place]  [address]      PATIENT NAME: Rubia Contreras  : 1975  DATE: 23  MRN: [id number]      Billing Provider: Hilary Lopez RN  Level of Service:   Patient PCP Information       Provider PCP Type    ELIZABETH JOHNSON General            Reason for Visit / Chief Complaint: No chief complaint on file.         History of Present Illness / Problem Focused Workflow     Rubia Contreras presents to the clinic with No chief complaint on file.     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  reports that she has been smoking cigarettes and vaping with nicotine. She started smoking about 30 years ago. She has a 12.5 pack-year smoking history. She has been exposed to tobacco smoke. She has never used smokeless tobacco. She reports current alcohol use of about 7.0 standard drinks of alcohol per week. She reports that she does not currently use drugs after having used the following drugs: Hydrocodone.    Family History  Ms.'s family history includes Atrial fibrillation in her father; Cerebral palsy in her daughter; Cirrhosis in her mother; Hypertension in her father; Hypothyroidism in her mother; No Known Problems in her daughter and son; Pancreatitis in her brother.    Medications and Allergies     Medications  No outpatient medications have been  marked as taking for the 11/1/23 encounter (Orders Only) with Hilary Lopez RN.       Allergies  Review of patient's allergies indicates:  No Known Allergies    Physical Examination   There were no vitals filed for this visit.  Physical Exam          Lab Results   Component Value Date    WBC 4.74 10/03/2023    HGB 14.3 10/03/2023    HCT 43.0 10/03/2023    MCV 96.6 (H) 10/03/2023     10/03/2023        CMP  Sodium   Date Value Ref Range Status   10/03/2023 138 136 - 145 mmol/L Final     Potassium   Date Value Ref Range Status   10/03/2023 4.5 3.5 - 5.1 mmol/L Final     Chloride   Date Value Ref Range Status   10/03/2023 108 (H) 98 - 107 mmol/L Final     CO2   Date Value Ref Range Status   10/03/2023 24 21 - 32 mmol/L Final     Glucose   Date Value Ref Range Status   10/03/2023 74 74 - 106 mg/dL Final     BUN   Date Value Ref Range Status   10/03/2023 15 7 - 18 mg/dL Final     Creatinine   Date Value Ref Range Status   10/03/2023 0.97 0.55 - 1.02 mg/dL Final     Calcium   Date Value Ref Range Status   10/03/2023 9.1 8.5 - 10.1 mg/dL Final     Total Protein   Date Value Ref Range Status   10/03/2023 8.3 (H) 6.4 - 8.2 g/dL Final     Albumin   Date Value Ref Range Status   10/03/2023 4.0 3.5 - 5.0 g/dL Final     Bilirubin, Total   Date Value Ref Range Status   10/03/2023 0.3 >0.0 - 1.2 mg/dL Final     Alk Phos   Date Value Ref Range Status   10/03/2023 92 39 - 100 U/L Final     AST   Date Value Ref Range Status   10/03/2023 14 (L) 15 - 37 U/L Final     ALT   Date Value Ref Range Status   10/03/2023 32 13 - 56 U/L Final     Anion Gap   Date Value Ref Range Status   10/03/2023 11 7 - 16 mmol/L Final     eGFR   Date Value Ref Range Status   10/03/2023 72 >=60 mL/min/1.73m2 Final     Procedures   Assessment and Plan (including Health Maintenance)   :    Plan:           Problem List Items Addressed This Visit          Neuro    Intractable migraine without aura and with status migrainosus    Relevant Medications     amitriptyline (ELAVIL) 25 MG tablet       Health Maintenance Topics with due status: Not Due       Topic Last Completion Date    Colorectal Cancer Screening 05/17/2023    Mammogram 07/11/2023    Lipid Panel 10/03/2023       Future Appointments   Date Time Provider Department Center   6/25/2024  8:30 AM Jimena Montez NP [facility] [place]   7/17/2024 10:45 AM [facility]        Health Maintenance Due   Topic Date Due    Hepatitis C Screening  Never done    Pneumococcal Vaccines (Age 0-64) (1 - PCV) Never done    Hemoglobin A1c (Diabetic Prevention Screening)  Never done    Influenza Vaccine (1) 09/01/2023        No follow-ups on file.     Signature:  ELIZABETH JOHNSON  [facility]  [address] [address] [place]  [address]    Date of encounter: 11/1/23

## 2023-11-10 NOTE — ASSESSMENT & PLAN NOTE
Toradol given IM in clinic.   Toradol oral for inflammation. Baclofen as needed for muscle spasms. Avoid heavy lifting.    Patient was instructed to rest, ice,  take medications as directed, alternate ice/moist heat to area, and monitor for worsening symptoms that require immediate evaluation. Patient was instructed to follow up if symptoms persist past current treatment plan to discuss further options, such as physical therapy, referral to ortho or other diagnostic imaging. Medication side effects/risk/benefits/directions on taking medications were reviewed with patient. Patient verbalized understanding of treatment plan and denies any questions.     Awake/Alert/Cooperative

## 2023-11-20 ENCOUNTER — OFFICE VISIT (OUTPATIENT)
Dept: FAMILY MEDICINE | Facility: CLINIC | Age: 48
End: 2023-11-20
Payer: COMMERCIAL

## 2023-11-20 ENCOUNTER — HOSPITAL ENCOUNTER (OUTPATIENT)
Dept: RADIOLOGY | Facility: HOSPITAL | Age: 48
Discharge: HOME | End: 2023-11-20
Payer: COMMERCIAL

## 2023-11-20 VITALS
HEIGHT: 63 IN | BODY MASS INDEX: 28.38 KG/M2 | WEIGHT: 160.19 LBS | HEART RATE: 68 BPM | TEMPERATURE: 98 F | SYSTOLIC BLOOD PRESSURE: 120 MMHG | DIASTOLIC BLOOD PRESSURE: 80 MMHG | RESPIRATION RATE: 18 BRPM | OXYGEN SATURATION: 99 %

## 2023-11-20 DIAGNOSIS — M79.672 LEFT FOOT PAIN: Primary | ICD-10-CM

## 2023-11-20 DIAGNOSIS — M79.672 LEFT FOOT PAIN: ICD-10-CM

## 2023-11-20 PROCEDURE — 3074F PR MOST RECENT SYSTOLIC BLOOD PRESSURE < 130 MM HG: ICD-10-PCS | Mod: ,,,

## 2023-11-20 PROCEDURE — 1160F PR REVIEW ALL MEDS BY PRESCRIBER/CLIN PHARMACIST DOCUMENTED: ICD-10-PCS | Mod: ,,,

## 2023-11-20 PROCEDURE — 99213 OFFICE O/P EST LOW 20 MIN: CPT | Mod: 25,,,

## 2023-11-20 PROCEDURE — 1160F RVW MEDS BY RX/DR IN RCRD: CPT | Mod: ,,,

## 2023-11-20 PROCEDURE — 96372 THER/PROPH/DIAG INJ SC/IM: CPT | Mod: ,,,

## 2023-11-20 PROCEDURE — 99213 PR OFFICE/OUTPT VISIT, EST, LEVL III, 20-29 MIN: ICD-10-PCS | Mod: 25,,,

## 2023-11-20 PROCEDURE — 3008F PR BODY MASS INDEX (BMI) DOCUMENTED: ICD-10-PCS | Mod: ,,,

## 2023-11-20 PROCEDURE — 3079F DIAST BP 80-89 MM HG: CPT | Mod: ,,,

## 2023-11-20 PROCEDURE — 3079F PR MOST RECENT DIASTOLIC BLOOD PRESSURE 80-89 MM HG: ICD-10-PCS | Mod: ,,,

## 2023-11-20 PROCEDURE — 1159F PR MEDICATION LIST DOCUMENTED IN MEDICAL RECORD: ICD-10-PCS | Mod: ,,,

## 2023-11-20 PROCEDURE — 1159F MED LIST DOCD IN RCRD: CPT | Mod: ,,,

## 2023-11-20 PROCEDURE — 3074F SYST BP LT 130 MM HG: CPT | Mod: ,,,

## 2023-11-20 PROCEDURE — 96372 PR INJECTION,THERAP/PROPH/DIAG2ST, IM OR SUBCUT: ICD-10-PCS | Mod: ,,,

## 2023-11-20 PROCEDURE — 73630 X-RAY EXAM OF FOOT: CPT | Mod: TC,LT

## 2023-11-20 PROCEDURE — 3008F BODY MASS INDEX DOCD: CPT | Mod: ,,,

## 2023-11-20 RX ORDER — KETOROLAC TROMETHAMINE 30 MG/ML
60 INJECTION, SOLUTION INTRAMUSCULAR; INTRAVENOUS
Status: COMPLETED | OUTPATIENT
Start: 2023-11-20 | End: 2023-11-20

## 2023-11-20 RX ADMIN — KETOROLAC TROMETHAMINE 60 MG: 30 INJECTION, SOLUTION INTRAMUSCULAR; INTRAVENOUS at 09:11

## 2023-11-20 NOTE — PROGRESS NOTES
ELIZABETH JOHNSON   [place] [id number]  [place]  [address]      PATIENT NAME: Rubia Contreras  : 1975  DATE: 23  MRN: [id number]      Billing Provider: ELIZABETH JOHNSON  Level of Service: HI OFFICE/OUTPT VISIT, EST, LEVL III, 20-29 MIN  Patient PCP Information       Provider PCP Type    ELIZABETH JOHNSON General            Reason for Visit / Chief Complaint: Foot Injury (Friday she was helping her  outside and a board was dropped on the top of her left foot.  She reports that it took her breath and started turning purple and swelling.  When she walks she has to walk on her heel because it hurts to put pressure on her foot.)         History of Present Illness / Problem Focused Workflow     Rubia Contreras presents to the clinic with Foot Injury (Friday she was helping her  outside and a board was dropped on the top of her left foot.  She reports that it took her breath and started turning purple and swelling.  When she walks she has to walk on her heel because it hurts to put pressure on her foot.)     49 y/o female presents to clinic with complaints of left foot pain. States she was helping her  Friday and board dropped on her foot. States pain has gotten worse since and she is not able to put any pressure on her foot.     Review of Systems     @Review of Systems   Constitutional:  Negative for chills, fatigue and fever.   HENT:  Negative for nasal congestion, ear pain, sinus pressure/congestion and sore throat.    Respiratory:  Negative for cough, chest tightness, shortness of breath and wheezing.    Cardiovascular:  Negative for chest pain and palpitations.   Gastrointestinal:  Negative for abdominal pain, nausea and vomiting.   Musculoskeletal:  Positive for joint swelling. Negative for myalgias.   Neurological:  Negative for dizziness, weakness, light-headedness and headaches.   Psychiatric/Behavioral:  Negative for suicidal  aure.        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  reports that she has been smoking cigarettes and vaping with nicotine. She started smoking about 30 years ago. She has a 12.5 pack-year smoking history. She has been exposed to tobacco smoke. She has never used smokeless tobacco. She reports current alcohol use of about 7.0 standard drinks of alcohol per week. She reports that she does not currently use drugs after having used the following drugs: Hydrocodone.    Family History  Ms.'s family history includes Atrial fibrillation in her father; Cerebral palsy in her daughter; Cirrhosis in her mother; Hypertension in her father; Hypothyroidism in her mother; No Known Problems in her daughter and son; Pancreatitis in her brother.    Medications and Allergies     Medications  Outpatient Medications Marked as Taking for the 23 encounter (Office Visit) with Jaron Hugo FNP   Medication Sig Dispense Refill    ALPRAZolam (XANAX) 0.25 MG tablet Take 1 tablet (0.25 mg total) by mouth 3 (three) times daily. 45 tablet 0    atorvastatin (LIPITOR) 20 MG tablet Take 1 tablet (20 mg total) by mouth once daily. 90 tablet 1    cyanocobalamin 1,000 mcg/mL injection Take 1 ml subcutaneous injection weekly for 6 weeks and then take 1 ml subcutaneous injection once a month for 4 months. 10 mL 0    cyclobenzaprine (FLEXERIL) 5 MG tablet Take 5 mg by mouth nightly as needed.       ergocalciferol (ERGOCALCIFEROL) 50,000 unit Cap Take 1 capsule (50,000 Units total) by mouth every 7 days. 12 capsule 0    pantoprazole (PROTONIX) 40 MG tablet Take 1 tablet (40 mg total) by mouth once daily. 30 tablet 2    polyethylene glycol (GLYCOLAX) 17 gram PwPk Take 17 g by mouth once daily. 30 each 1    sumatriptan (IMITREX) 100 MG tablet Take 1 tablet (100 mg total) by mouth as needed for Migraine. 30 tablet 5    topiramate (TOPAMAX) 100 MG tablet Take 1 tablet (100 mg total) by mouth 2 (two) times a day. 60 tablet 5     Current Facility-Administered Medications for the 11/20/23 encounter (Office Visit) with Jaron Hugo FNP   Medication Dose Route Frequency Provider Last Rate Last Admin    [COMPLETED] ketorolac injection 60 mg  60 mg Intramuscular 1 time in Clinic/HOD Jaron Hugo FNP   60 mg at 11/20/23 0910       Allergies  Review of patient's allergies indicates:  No Known Allergies    Physical Examination     Vitals:    11/20/23 0832   BP: 120/80   Pulse: 68   Resp: 18   Temp: 98 °F (36.7 °C)     Physical Exam  Vitals and nursing note reviewed.   Constitutional:       General: She is awake.      Appearance: Normal appearance. She is overweight.   HENT:      Head: Normocephalic.      Right Ear: Tympanic membrane and ear canal normal.      Left Ear: Tympanic membrane and ear canal normal.      Nose: Nose normal.      Mouth/Throat:      Lips: Pink.      Mouth: Mucous membranes are moist.      Pharynx: Oropharynx is clear. Uvula midline.   Eyes:      Conjunctiva/sclera: Conjunctivae normal.      Pupils: Pupils are equal, round, and reactive to light.   Cardiovascular:      Rate and Rhythm: Normal rate and regular rhythm.      Pulses:           Dorsalis pedis pulses are 1+ on the right side and 1+ on the left side.      Heart sounds: Normal heart sounds, S1 normal and S2 normal.   Pulmonary:      Effort: No respiratory distress.      Breath sounds: Normal breath sounds. No decreased breath sounds,  wheezing, rhonchi or rales.   Abdominal:      General: Bowel sounds are normal.      Palpations: Abdomen is soft.      Tenderness: There is no abdominal tenderness.   Musculoskeletal:      Cervical back: Normal range of motion.      Right foot: Normal.      Left foot: Decreased range of motion. Swelling and tenderness present. Normal pulse.        Legs:       Comments: Mild edema and bruising noted to left top foot. TTP. Decreased strength in left foot due to pain with ROM   Skin:     General: Skin is warm.      Capillary Refill: Capillary refill takes less than 2 seconds.   Neurological:      Mental Status: She is alert and oriented to person, place, and time.   Psychiatric:         Attention and Perception: Attention normal.         Mood and Affect: Mood normal.         Speech: Speech normal.         Behavior: Behavior normal. Behavior is cooperative.         Thought Content: Thought content does not include homicidal or suicidal ideation. Thought content does not include homicidal or suicidal plan.               Lab Results   Component Value Date    WBC 4.74 10/03/2023    HGB 14.3 10/03/2023    HCT 43.0 10/03/2023    MCV 96.6 (H) 10/03/2023     10/03/2023        CMP  Sodium   Date Value Ref Range Status   10/03/2023 138 136 - 145 mmol/L Final     Potassium   Date Value Ref Range Status   10/03/2023 4.5 3.5 - 5.1 mmol/L Final     Chloride   Date Value Ref Range Status   10/03/2023 108 (H) 98 - 107 mmol/L Final     CO2   Date Value Ref Range Status   10/03/2023 24 21 - 32 mmol/L Final     Glucose   Date Value Ref Range Status   10/03/2023 74 74 - 106 mg/dL Final     BUN   Date Value Ref Range Status   10/03/2023 15 7 - 18 mg/dL Final     Creatinine   Date Value Ref Range Status   10/03/2023 0.97 0.55 - 1.02 mg/dL Final     Calcium   Date Value Ref Range Status   10/03/2023 9.1 8.5 - 10.1 mg/dL Final     Total Protein   Date Value Ref Range Status   10/03/2023 8.3 (H) 6.4 - 8.2 g/dL Final     Albumin   Date  Value Ref Range Status   10/03/2023 4.0 3.5 - 5.0 g/dL Final     Bilirubin, Total   Date Value Ref Range Status   10/03/2023 0.3 >0.0 - 1.2 mg/dL Final     Alk Phos   Date Value Ref Range Status   10/03/2023 92 39 - 100 U/L Final     AST   Date Value Ref Range Status   10/03/2023 14 (L) 15 - 37 U/L Final     ALT   Date Value Ref Range Status   10/03/2023 32 13 - 56 U/L Final     Anion Gap   Date Value Ref Range Status   10/03/2023 11 7 - 16 mmol/L Final     eGFR   Date Value Ref Range Status   10/03/2023 72 >=60 mL/min/1.73m2 Final     Procedures   Assessment and Plan (including Health Maintenance)   :    Plan:           Problem List Items Addressed This Visit          Orthopedic    Left foot pain - Primary    Current Assessment & Plan     Toradol IM today. Left foot Xray and will call pt with results and any new orders. RICE therapy. Ibuprofen/Tylenol as needed.          Relevant Medications    ketorolac injection 60 mg (Completed)    Other Relevant Orders    X-Ray Foot Complete 3 view Left       Health Maintenance Topics with due status: Not Due       Topic Last Completion Date    Colorectal Cancer Screening 05/17/2023    Mammogram 07/11/2023    Lipid Panel 10/03/2023       Future Appointments   Date Time Provider Department Center   6/25/2024  8:30 AM Jimena Montez NP [facility]   7/17/2024 10:45 AM [facility]        Health Maintenance Due   Topic Date Due    Hepatitis C Screening  Never done    Pneumococcal Vaccines (Age 0-64) (1 - PCV) Never done    Hemoglobin A1c (Diabetic Prevention Screening)  Never done    Influenza Vaccine (1) 09/01/2023        Follow up if symptoms worsen or fail to improve.     Signature:  MONSE VILLEDA TARA  [facility]  [address] [place] [address], [place]  [address]    Date of encounter: 11/20/23

## 2023-11-20 NOTE — ASSESSMENT & PLAN NOTE
Toradol IM today. Left foot Xray and will call pt with results and any new orders. RICE therapy. Ibuprofen/Tylenol as needed.

## 2023-11-30 ENCOUNTER — OFFICE VISIT (OUTPATIENT)
Dept: FAMILY MEDICINE | Facility: CLINIC | Age: 48
End: 2023-11-30
Payer: COMMERCIAL

## 2023-11-30 ENCOUNTER — PATIENT MESSAGE (OUTPATIENT)
Dept: FAMILY MEDICINE | Facility: CLINIC | Age: 48
End: 2023-11-30
Payer: COMMERCIAL

## 2023-11-30 VITALS
RESPIRATION RATE: 18 BRPM | HEIGHT: 63 IN | SYSTOLIC BLOOD PRESSURE: 120 MMHG | BODY MASS INDEX: 29.06 KG/M2 | OXYGEN SATURATION: 100 % | WEIGHT: 164 LBS | DIASTOLIC BLOOD PRESSURE: 88 MMHG | TEMPERATURE: 98 F | HEART RATE: 62 BPM

## 2023-11-30 DIAGNOSIS — M79.672 LEFT FOOT PAIN: Primary | ICD-10-CM

## 2023-11-30 PROCEDURE — 1160F PR REVIEW ALL MEDS BY PRESCRIBER/CLIN PHARMACIST DOCUMENTED: ICD-10-PCS | Mod: ,,,

## 2023-11-30 PROCEDURE — 3079F PR MOST RECENT DIASTOLIC BLOOD PRESSURE 80-89 MM HG: ICD-10-PCS | Mod: ,,,

## 2023-11-30 PROCEDURE — 3079F DIAST BP 80-89 MM HG: CPT | Mod: ,,,

## 2023-11-30 PROCEDURE — 1159F MED LIST DOCD IN RCRD: CPT | Mod: ,,,

## 2023-11-30 PROCEDURE — 1160F RVW MEDS BY RX/DR IN RCRD: CPT | Mod: ,,,

## 2023-11-30 PROCEDURE — 3074F SYST BP LT 130 MM HG: CPT | Mod: ,,,

## 2023-11-30 PROCEDURE — 96372 THER/PROPH/DIAG INJ SC/IM: CPT | Mod: ,,,

## 2023-11-30 PROCEDURE — 1159F PR MEDICATION LIST DOCUMENTED IN MEDICAL RECORD: ICD-10-PCS | Mod: ,,,

## 2023-11-30 PROCEDURE — 99214 PR OFFICE/OUTPT VISIT, EST, LEVL IV, 30-39 MIN: ICD-10-PCS | Mod: 25,,,

## 2023-11-30 PROCEDURE — 3008F PR BODY MASS INDEX (BMI) DOCUMENTED: ICD-10-PCS | Mod: ,,,

## 2023-11-30 PROCEDURE — 3008F BODY MASS INDEX DOCD: CPT | Mod: ,,,

## 2023-11-30 PROCEDURE — 99214 OFFICE O/P EST MOD 30 MIN: CPT | Mod: 25,,,

## 2023-11-30 PROCEDURE — 3074F PR MOST RECENT SYSTOLIC BLOOD PRESSURE < 130 MM HG: ICD-10-PCS | Mod: ,,,

## 2023-11-30 PROCEDURE — 96372 PR INJECTION,THERAP/PROPH/DIAG2ST, IM OR SUBCUT: ICD-10-PCS | Mod: ,,,

## 2023-11-30 RX ORDER — KETOROLAC TROMETHAMINE 30 MG/ML
60 INJECTION, SOLUTION INTRAMUSCULAR; INTRAVENOUS
Status: COMPLETED | OUTPATIENT
Start: 2023-11-30 | End: 2023-11-30

## 2023-11-30 RX ORDER — GABAPENTIN 300 MG/1
300 CAPSULE ORAL 2 TIMES DAILY
Qty: 60 CAPSULE | Refills: 0 | Status: SHIPPED | OUTPATIENT
Start: 2023-11-30

## 2023-11-30 RX ADMIN — KETOROLAC TROMETHAMINE 60 MG: 30 INJECTION, SOLUTION INTRAMUSCULAR; INTRAVENOUS at 10:11

## 2023-11-30 NOTE — PROGRESS NOTES
ELIZABETH JOHNSON   [id number]  [place]  [address]      PATIENT NAME: Rubia Contreras  : 1975  DATE: 23  MRN: [id number]      Billing Provider: ELIZABETH JOHNSON  Level of Service:   Patient PCP Information       Provider PCP Type    ELIZABETH JOHNSON General            Reason for Visit / Chief Complaint: Foot Pain (Still having severe pain to the top of her left foot.  Can not stand anything touching the top of her foot because it hurts so bad.  Reports slight swelling that never goes down.  Also reports that it hurts the bottom of her foot when she walks.  She has to walk on her heel.  Reports discoloration to her toes occasionally.)         History of Present Illness / Problem Focused Workflow     Rubia Contreras presents to the clinic with Foot Pain (Still having severe pain to the top of her left foot.  Can not stand anything touching the top of her foot because it hurts so bad.  Reports slight swelling that never goes down.  Also reports that it hurts the bottom of her foot when she walks.  She has to walk on her heel.  Reports discoloration to her toes occasionally.)     49 y/o female presents to clinic with complaints of ongoing left foot pain. Pt had a board drop on her left foot about 10 days ago. She was seen in clinic and xray was read normal. States pain has worsened since. States anything touching the top of her foot causes pain. Rates pain 8/10 today. States the boot does help the underside of her foot feel better. States has taken Toradol, muscle relaxer, Ibuprofen without any relief of pain.     Review of Systems     @Review of Systems   Constitutional:  Negative for chills, fatigue and fever.   HENT:  Negative for nasal congestion, ear pain, sinus pressure/congestion and sore throat.    Respiratory:  Negative for cough, chest tightness, shortness of breath and wheezing.    Cardiovascular:  Negative for chest pain and  palpitations.   Gastrointestinal:  Negative for abdominal pain, nausea and vomiting.   Musculoskeletal:  Positive for joint swelling (left foot pain). Negative for myalgias.   Neurological:  Negative for dizziness, weakness, light-headedness and headaches.   Psychiatric/Behavioral:  Negative for suicidal ideas.        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  reports that she has been smoking cigarettes and vaping with nicotine. She started smoking about 30 years ago. She has a 12.5 pack-year smoking history. She has been exposed to tobacco smoke. She has never used smokeless tobacco. She reports current alcohol use of about 7.0 standard drinks of alcohol per week. She reports that she does not currently use drugs after having used the following drugs: Hydrocodone.    Family History  Ms.'s family history includes Atrial fibrillation in her father; Cerebral palsy in her daughter; Cirrhosis in her mother; Hypertension in her father; Hypothyroidism in her mother; No Known Problems in her daughter and son; Pancreatitis in her brother.    Medications and Allergies     Medications  Outpatient Medications Marked as Taking for the 23 encounter (Office Visit) with Jaron Hugo FNP   Medication Sig Dispense Refill    ALPRAZolam (XANAX) 0.25 MG tablet Take 1 tablet (0.25 mg total) by mouth 3 (three) times daily. 45 tablet 0    atorvastatin (LIPITOR) 20 MG tablet Take 1 tablet (20 mg  total) by mouth once daily. 90 tablet 1    cyanocobalamin 1,000 mcg/mL injection Take 1 ml subcutaneous injection weekly for 6 weeks and then take 1 ml subcutaneous injection once a month for 4 months. 10 mL 0    cyclobenzaprine (FLEXERIL) 5 MG tablet Take 5 mg by mouth nightly as needed.      ergocalciferol (ERGOCALCIFEROL) 50,000 unit Cap Take 1 capsule (50,000 Units total) by mouth every 7 days. 12 capsule 0    pantoprazole (PROTONIX) 40 MG tablet Take 1 tablet (40 mg total) by mouth once daily. 30 tablet 2    polyethylene glycol (GLYCOLAX) 17 gram PwPk Take 17 g by mouth once daily. 30 each 1    sumatriptan (IMITREX) 100 MG tablet Take 1 tablet (100 mg total) by mouth as needed for Migraine. 30 tablet 5    topiramate (TOPAMAX) 100 MG tablet Take 1 tablet (100 mg total) by mouth 2 (two) times a day. 60 tablet 5     Current Facility-Administered Medications for the 11/30/23 encounter (Office Visit) with Jaron Hugo FNP   Medication Dose Route Frequency Provider Last Rate Last Admin    [COMPLETED] ketorolac injection 60 mg  60 mg Intramuscular 1 time in Clinic/HOD Jaron Hugo FNP   60 mg at 11/30/23 1000       Allergies  Review of patient's allergies indicates:  No Known Allergies    Physical Examination     Vitals:    11/30/23 0928   BP: 120/88   Pulse: 62   Resp: 18   Temp: 98.1 °F (36.7 °C)     Physical Exam  Vitals and nursing note reviewed.   Constitutional:       General: She is awake.      Appearance: Normal appearance.   HENT:      Head: Normocephalic.      Right Ear: Tympanic membrane and ear canal normal.      Left Ear: Tympanic membrane and ear canal normal.      Nose: Nose normal.      Mouth/Throat:      Lips: Pink.      Mouth: Mucous membranes are moist.      Pharynx: Oropharynx is clear. Uvula midline.   Eyes:      Conjunctiva/sclera: Conjunctivae normal.      Pupils: Pupils are equal, round, and reactive to light.   Cardiovascular:      Rate and Rhythm: Normal rate and regular rhythm.       Pulses:           Dorsalis pedis pulses are 1+ on the left side.      Heart sounds: Normal heart sounds, S1 normal and S2 normal.   Pulmonary:      Effort: No respiratory distress.      Breath sounds: Normal breath sounds. No decreased breath sounds, wheezing, rhonchi or rales.   Abdominal:      General: Bowel sounds are normal.      Palpations: Abdomen is soft.      Tenderness: There is no abdominal tenderness.   Musculoskeletal:      Cervical back: Normal range of motion.      Left foot: Decreased range of motion. Swelling and tenderness present.        Legs:         Feet:    Feet:      Left foot:      Skin integrity: Erythema present.   Skin:     General: Skin is warm.      Capillary Refill: Capillary refill takes less than 2 seconds.   Neurological:      Mental Status: She is alert and oriented to person, place, and time.   Psychiatric:         Attention and Perception: Attention normal.         Mood and Affect: Mood normal.         Speech: Speech normal.         Behavior: Behavior normal. Behavior is cooperative.         Thought Content: Thought content does not include homicidal or suicidal ideation. Thought content does not include homicidal or suicidal plan.               Lab Results   Component Value Date    WBC 4.74 10/03/2023    HGB 14.3 10/03/2023    HCT 43.0 10/03/2023    MCV 96.6 (H) 10/03/2023     10/03/2023        CMP  Sodium   Date Value Ref Range Status   10/03/2023 138 136 - 145 mmol/L Final     Potassium   Date Value Ref Range Status   10/03/2023 4.5 3.5 - 5.1 mmol/L Final     Chloride   Date Value Ref Range Status   10/03/2023 108 (H) 98 - 107 mmol/L Final     CO2   Date Value Ref Range Status   10/03/2023 24 21 - 32 mmol/L Final     Glucose   Date Value Ref Range Status   10/03/2023 74 74 - 106 mg/dL Final     BUN   Date Value Ref Range Status   10/03/2023 15 7 - 18 mg/dL Final     Creatinine   Date Value Ref Range Status   10/03/2023 0.97 0.55 - 1.02 mg/dL Final     Calcium   Date Value  Ref Range Status   10/03/2023 9.1 8.5 - 10.1 mg/dL Final     Total Protein   Date Value Ref Range Status   10/03/2023 8.3 (H) 6.4 - 8.2 g/dL Final     Albumin   Date Value Ref Range Status   10/03/2023 4.0 3.5 - 5.0 g/dL Final     Bilirubin, Total   Date Value Ref Range Status   10/03/2023 0.3 >0.0 - 1.2 mg/dL Final     Alk Phos   Date Value Ref Range Status   10/03/2023 92 39 - 100 U/L Final     AST   Date Value Ref Range Status   10/03/2023 14 (L) 15 - 37 U/L Final     ALT   Date Value Ref Range Status   10/03/2023 32 13 - 56 U/L Final     Anion Gap   Date Value Ref Range Status   10/03/2023 11 7 - 16 mmol/L Final     eGFR   Date Value Ref Range Status   10/03/2023 72 >=60 mL/min/1.73m2 Final     Procedures   Assessment and Plan (including Health Maintenance)   :    Plan:           Problem List Items Addressed This Visit          Orthopedic    Left foot pain - Primary    Current Assessment & Plan     Xray left foot today. Toradol IM.  Xray read as negative with no fracture noted. Gabapentin RX today. Medication instructions and education given with understanding voiced. Will refer pain management for further evaluation of pain.         Relevant Medications    ketorolac injection 60 mg (Completed)    gabapentin (NEURONTIN) 300 MG capsule    Other Relevant Orders    X-Ray Foot Complete 3 view Left (Completed)    Ambulatory referral/consult to Pain Clinic       Health Maintenance Topics with due status: Not Due       Topic Last Completion Date    Colorectal Cancer Screening 05/17/2023    Mammogram 07/11/2023    Lipid Panel 10/03/2023       Future Appointments   Date Time Provider Department Center   6/25/2024  8:30 AM Jimena Montez, NP [facility]   7/17/2024 10:45 AM [facility]        Health Maintenance Due   Topic Date Due    Hepatitis C Screening  Never done    Pneumococcal Vaccines (Age 0-64) (1 - PCV) Never done    Hemoglobin A1c (Diabetic Prevention Screening)   Never done    Influenza Vaccine (1) 09/01/2023        No follow-ups on file.     Signature:  MONSE VILLEDA [facility]  [address] [place]  [address]    Date of encounter: 11/30/23

## 2023-11-30 NOTE — ASSESSMENT & PLAN NOTE
Xray left foot today. Toradol IM.  Xray read as negative with no fracture noted. Gabapentin RX today. Medication instructions and education given with understanding voiced. Will refer pain management for further evaluation of pain.

## 2023-12-01 ENCOUNTER — HOSPITAL ENCOUNTER (EMERGENCY)
Facility: HOSPITAL | Age: 48
Discharge: HOME | End: 2023-12-01
Payer: COMMERCIAL

## 2023-12-01 VITALS
HEIGHT: 63 IN | OXYGEN SATURATION: 99 % | DIASTOLIC BLOOD PRESSURE: 89 MMHG | TEMPERATURE: 99 F | RESPIRATION RATE: 18 BRPM | SYSTOLIC BLOOD PRESSURE: 120 MMHG | WEIGHT: 162 LBS | BODY MASS INDEX: 28.7 KG/M2 | HEART RATE: 92 BPM

## 2023-12-01 DIAGNOSIS — S92.325A CLOSED NONDISPLACED FRACTURE OF SECOND METATARSAL BONE OF LEFT FOOT, INITIAL ENCOUNTER: Primary | ICD-10-CM

## 2023-12-01 LAB
ALBUMIN SERPL BCP-MCNC: 3.8 G/DL (ref 3.5–5)
ALBUMIN/GLOB SERPL: 0.9 {RATIO}
ALP SERPL-CCNC: 91 U/L (ref 39–100)
ALT SERPL W P-5'-P-CCNC: 53 U/L (ref 13–56)
ANION GAP SERPL CALCULATED.3IONS-SCNC: 9 MMOL/L (ref 7–16)
AST SERPL W P-5'-P-CCNC: 29 U/L (ref 15–37)
BASOPHILS # BLD AUTO: 0.03 K/UL (ref 0–0.2)
BASOPHILS NFR BLD AUTO: 0.5 % (ref 0–1)
BILIRUB SERPL-MCNC: 0.2 MG/DL (ref ?–1.2)
BUN SERPL-MCNC: 23 MG/DL (ref 7–18)
BUN/CREAT SERPL: 22 (ref 6–20)
CALCIUM SERPL-MCNC: 8.8 MG/DL (ref 8.5–10.1)
CHLORIDE SERPL-SCNC: 111 MMOL/L (ref 98–107)
CO2 SERPL-SCNC: 25 MMOL/L (ref 21–32)
CREAT SERPL-MCNC: 1.06 MG/DL (ref 0.55–1.02)
CRP SERPL-MCNC: <0.29 MG/DL (ref 0–0.8)
DIFFERENTIAL METHOD BLD: ABNORMAL
EGFR (NO RACE VARIABLE) (RUSH/TITUS): 65 ML/MIN/1.73M2
EOSINOPHIL # BLD AUTO: 0.07 K/UL (ref 0–0.5)
EOSINOPHIL NFR BLD AUTO: 1.2 % (ref 1–4)
ERYTHROCYTE [DISTWIDTH] IN BLOOD BY AUTOMATED COUNT: 12.9 % (ref 11.5–14.5)
GLOBULIN SER-MCNC: 4.1 G/DL (ref 2–4)
GLUCOSE SERPL-MCNC: 101 MG/DL (ref 74–106)
HCT VFR BLD AUTO: 37.6 % (ref 38–47)
HGB BLD-MCNC: 12.7 G/DL (ref 12–16)
IMM GRANULOCYTES # BLD AUTO: 0.02 K/UL (ref 0–0.04)
IMM GRANULOCYTES NFR BLD: 0.3 % (ref 0–0.4)
LYMPHOCYTES # BLD AUTO: 1.22 K/UL (ref 1–4.8)
LYMPHOCYTES NFR BLD AUTO: 20.4 % (ref 27–41)
MCH RBC QN AUTO: 31.9 PG (ref 27–31)
MCHC RBC AUTO-ENTMCNC: 33.8 G/DL (ref 32–36)
MCV RBC AUTO: 94.5 FL (ref 80–96)
MONOCYTES # BLD AUTO: 0.26 K/UL (ref 0–0.8)
MONOCYTES NFR BLD AUTO: 4.4 % (ref 2–6)
MPC BLD CALC-MCNC: 9.2 FL (ref 9.4–12.4)
NEUTROPHILS # BLD AUTO: 4.37 K/UL (ref 1.8–7.7)
NEUTROPHILS NFR BLD AUTO: 73.2 % (ref 53–65)
NRBC # BLD AUTO: 0 X10E3/UL
NRBC, AUTO (.00): 0 %
PLATELET # BLD AUTO: 305 K/UL (ref 150–400)
POTASSIUM SERPL-SCNC: 3.7 MMOL/L (ref 3.5–5.1)
PROT SERPL-MCNC: 7.9 G/DL (ref 6.4–8.2)
RBC # BLD AUTO: 3.98 M/UL (ref 4.2–5.4)
SODIUM SERPL-SCNC: 141 MMOL/L (ref 136–145)
URATE SERPL-MCNC: 4.3 MG/DL (ref 2.6–6)
WBC # BLD AUTO: 5.97 K/UL (ref 4.5–11)

## 2023-12-01 PROCEDURE — 96375 TX/PRO/DX INJ NEW DRUG ADDON: CPT

## 2023-12-01 PROCEDURE — 99284 PR EMERGENCY DEPT VISIT,LEVEL IV: ICD-10-PCS | Mod: ,,, | Performed by: NURSE PRACTITIONER

## 2023-12-01 PROCEDURE — 63600175 PHARM REV CODE 636 W HCPCS: Performed by: NURSE PRACTITIONER

## 2023-12-01 PROCEDURE — 86140 C-REACTIVE PROTEIN: CPT | Performed by: NURSE PRACTITIONER

## 2023-12-01 PROCEDURE — 80053 COMPREHEN METABOLIC PANEL: CPT | Performed by: NURSE PRACTITIONER

## 2023-12-01 PROCEDURE — 99284 EMERGENCY DEPT VISIT MOD MDM: CPT | Mod: ,,, | Performed by: NURSE PRACTITIONER

## 2023-12-01 PROCEDURE — 84550 ASSAY OF BLOOD/URIC ACID: CPT | Performed by: NURSE PRACTITIONER

## 2023-12-01 PROCEDURE — 85025 COMPLETE CBC W/AUTO DIFF WBC: CPT | Performed by: NURSE PRACTITIONER

## 2023-12-01 PROCEDURE — 96374 THER/PROPH/DIAG INJ IV PUSH: CPT

## 2023-12-01 PROCEDURE — 99285 EMERGENCY DEPT VISIT HI MDM: CPT | Mod: 25

## 2023-12-01 RX ORDER — ONDANSETRON 4 MG/1
4 TABLET, FILM COATED ORAL EVERY 6 HOURS
Qty: 12 TABLET | Refills: 0 | Status: SHIPPED | OUTPATIENT
Start: 2023-12-01

## 2023-12-01 RX ORDER — MORPHINE SULFATE 4 MG/ML
4 INJECTION, SOLUTION INTRAMUSCULAR; INTRAVENOUS
Status: COMPLETED | OUTPATIENT
Start: 2023-12-01 | End: 2023-12-01

## 2023-12-01 RX ORDER — MORPHINE SULFATE 4 MG/ML
4 INJECTION, SOLUTION INTRAMUSCULAR; INTRAVENOUS
Status: DISCONTINUED | OUTPATIENT
Start: 2023-12-01 | End: 2023-12-01

## 2023-12-01 RX ORDER — HYDROCODONE BITARTRATE AND ACETAMINOPHEN 7.5; 325 MG/1; MG/1
1 TABLET ORAL EVERY 6 HOURS PRN
Qty: 12 TABLET | Refills: 0 | Status: SHIPPED | OUTPATIENT
Start: 2023-12-01

## 2023-12-01 RX ORDER — ONDANSETRON 4 MG/1
4 TABLET, ORALLY DISINTEGRATING ORAL
Status: DISCONTINUED | OUTPATIENT
Start: 2023-12-01 | End: 2023-12-01

## 2023-12-01 RX ORDER — ONDANSETRON 2 MG/ML
4 INJECTION INTRAMUSCULAR; INTRAVENOUS
Status: COMPLETED | OUTPATIENT
Start: 2023-12-01 | End: 2023-12-01

## 2023-12-01 RX ADMIN — ONDANSETRON 4 MG: 2 INJECTION INTRAMUSCULAR; INTRAVENOUS at 07:12

## 2023-12-01 RX ADMIN — MORPHINE SULFATE 4 MG: 4 INJECTION, SOLUTION INTRAMUSCULAR; INTRAVENOUS at 07:12

## 2023-12-01 NOTE — ED TRIAGE NOTES
Pt reports dropping a piece of wood on her foot two weeks ago and it not getting any better. She reports having xrays done and nothing being broke. She reports she wants something done.

## 2023-12-01 NOTE — ED PROVIDER NOTES
Encounter Date: 2023       History     Chief Complaint   Patient presents with    Foot Injury     48 year old female presents to ED for foot pain status post injury. Patient reports she was helping to build a deck 2 weeks ago and a 2x8x12 foot board fell on her foot. She reports having evaluation of foot injury at time of injury that was negative. She states she is continuing to have foot pain and is having increased swelling to left foot with increased sensitivity to foot. She reports a history of CRPS that caused her to have the same pain and presentation to her left arm and she almost lost her arm due to complications. She is having gait disturbances and inability to bear weight to foot.     The history is provided by the patient.     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    TONSILLECTOMY       Family History   Problem Relation Age of Onset    Hypothyroidism Mother     Cirrhosis Mother     Hypertension Father     Atrial fibrillation Father     Pancreatitis Brother     No Known Problems Daughter     Cerebral palsy Daughter     No Known Problems Son      Social History     Tobacco Use    Smoking status: Every Day     Current packs/day: 0.00     Average packs/day: 0.5 packs/day for 25.0 years (12.5 ttl pk-yrs)     Types: Cigarettes, Vaping with nicotine     Start date: 1993     Last attempt to quit: 2018     Years since quittin.9     Passive exposure: Past    Smokeless tobacco: Never    Substance Use Topics    Alcohol use: Yes     Alcohol/week: 7.0 standard drinks of alcohol     Types: 7 Drinks containing 0.5 oz of alcohol per week     Comment: 2 mixed drinks per week    Drug use: Not Currently     Types: Hydrocodone     Comment: past use     Review of Systems   Constitutional:  Negative for chills and fever.   HENT:  Negative for sinus pressure and sinus pain.    Eyes:  Negative for photophobia and visual disturbance.   Respiratory:  Negative for cough and shortness of breath.    Cardiovascular:  Negative for chest pain and palpitations.   Gastrointestinal:  Negative for nausea and vomiting.   Genitourinary:  Negative for dysuria and urgency.   Musculoskeletal:  Positive for arthralgias, gait problem and joint swelling.   Skin:  Positive for color change. Negative for wound.   Neurological:  Negative for dizziness and weakness.   Hematological:  Negative for adenopathy. Does not bruise/bleed easily.   Psychiatric/Behavioral:  Negative for agitation and confusion.    All other systems reviewed and are negative.      Physical Exam     Initial Vitals [12/01/23 1610]   BP Pulse Resp Temp SpO2   (!) 142/87 87 17 98.5 °F (36.9 °C) 100 %      MAP       --         Physical Exam    Nursing note and vitals reviewed.  Constitutional: She appears well-developed and well-nourished.   HENT:   Head: Normocephalic and atraumatic.   Eyes: EOM are normal. Pupils are equal, round, and reactive to light.   Neck: Neck supple.   Normal range of motion.  Cardiovascular:  Normal rate and regular rhythm.           No murmur heard.  Pulmonary/Chest: She has no wheezes. She has no rhonchi.   Abdominal: Abdomen is soft. She exhibits no distension. There is no abdominal tenderness.   Musculoskeletal:         General: Tenderness present. No edema.      Cervical back: Normal range of motion and neck supple.      Left foot: Decreased range of motion. Tenderness present.     Lymphadenopathy:     She has no cervical adenopathy.    Neurological: She is alert and oriented to person, place, and time. No cranial nerve deficit or sensory deficit.   Skin: Skin is warm and dry. Capillary refill takes less than 2 seconds.         Medical Screening Exam   See Full Note    ED Course   Procedures  Labs Reviewed   COMPREHENSIVE METABOLIC PANEL - Abnormal; Notable for the following components:       Result Value    Chloride 111 (*)     BUN 23 (*)     Creatinine 1.06 (*)     BUN/Creatinine Ratio 22 (*)     Globulin 4.1 (*)     All other components within normal limits   CBC WITH DIFFERENTIAL - Abnormal; Notable for the following components:    RBC 3.98 (*)     Hematocrit 37.6 (*)     MCH 31.9 (*)     MPV 9.2 (*)     Neutrophils % 73.2 (*)     Lymphocytes % 20.4 (*)     All other components within normal limits   C-REACTIVE PROTEIN - Normal   URIC ACID - Normal   CBC W/ AUTO DIFFERENTIAL    Narrative:     The following orders were created for panel order CBC auto differential.  Procedure                               Abnormality         Status                     ---------                               -----------         ------                     CBC with Differential[[id number]]       Abnormal            Final result                 Please view results for these tests on the individual orders.          Imaging Results              CT Foot With Contrast Left (Final result)  Result time 12/01/23 18:34:01      Final result by Don Norris DO (12/01/23 18:34:01)                   Impression:      Findings as detailed above.    The CT exam was performed using one or more of the following dose    reduction techniques- Automated exposure control, adjustment of the mA    and/or kV according to patient size, and/or use of iterative    reconstructed technique.    Point of Service: [facility]      Electronically signed by: Don Norris  Date:    12/01/2023  Time:    18:34               Narrative:    EXAMINATION:  CT FOOT WITH CONTRAST  LEFT    CLINICAL HISTORY:  Foot pain, persistent post-traumatic;    COMPARISON:  Left foot x-ray November 30, 2023    TECHNIQUE:  Multiple axial tomographic images of the left foot were obtained without the use of intravenous contrast.  Coronal and sagittal reformatted images provided.    FINDINGS:  There is a minimally displaced oblique fracture demonstrated through the mid to distal diaphysis of the 2nd metatarsal.  For mild plantar and posterior calcaneal spurring.                                       Medications   morphine injection 4 mg (4 mg Intravenous Given 12/1/23 1908)   ondansetron injection 4 mg (4 mg Intravenous Given 12/1/23 1908)     Medical Decision Making  48 year old female presents to ED for foot pain status post injury. Patient reports she was helping to build a deck 2 weeks ago and a 2x8x12 foot board fell on her foot. She reports having evaluation of foot injury at time of injury that was negative. She states she is continuing to have foot pain and is having increased swelling to left foot with increased sensitivity to foot. She reports a history of CRPS that caused her to have the same pain and presentation to her left arm and she almost lost her arm due to complications. She is having gait disturbances and inability to bear weight to foot.     Labs, diagnostics obtained. IM Morphine, Zofran administered. Prescription/referral provided    Amount and/or Complexity of Data Reviewed  Labs: ordered.     Details: Chloride 111 (*)  BUN 23 (*)  Creatinine 1.06 (*)  BUN/Creatinine Ratio 22 (*)  Globulin 4.1 (*)  All other components within normal limits  CBC WITH DIFFERENTIAL - Abnormal; Notable for the following components:  RBC 3.98 (*)  Hematocrit 37.6 (*)  MCH 31.9 (*)  MPV 9.2 (*)  Neutrophils % 73.2 (*)  Lymphocytes % 20.4 (*)    Radiology: ordered.     Details: There is a minimally displaced oblique fracture demonstrated through the mid to distal diaphysis of the 2nd  metatarsal.    Risk  Prescription drug management.                                      Clinical Impression:   Final diagnoses:  [S92.325A] Closed nondisplaced fracture of second metatarsal bone of left foot, initial encounter (Primary)        ED Disposition Condition    Discharge Stable          ED Prescriptions       Medication Sig Dispense Start Date End Date Auth. Provider    HYDROcodone-acetaminophen (NORCO) 7.5-325 mg per tablet Take 1 tablet by mouth every 6 (six) hours as needed for Pain. 12 tablet 12/1/2023 -- Tabitha Harvey FNP    ondansetron (ZOFRAN) 4 MG tablet Take 1 tablet (4 mg total) by mouth every 6 (six) hours. 12 tablet 12/1/2023 -- Tabitha Harvey FNP          Follow-up Information    None          Tabitha Harvey FNP  12/08/23 9717

## 2023-12-05 ENCOUNTER — OFFICE VISIT (OUTPATIENT)
Dept: ORTHOPEDICS | Facility: CLINIC | Age: 48
End: 2023-12-05
Payer: COMMERCIAL

## 2023-12-05 VITALS — BODY MASS INDEX: 28.7 KG/M2 | WEIGHT: 162 LBS | HEIGHT: 63 IN

## 2023-12-05 DIAGNOSIS — S92.325A CLOSED NONDISPLACED FRACTURE OF SECOND METATARSAL BONE OF LEFT FOOT, INITIAL ENCOUNTER: ICD-10-CM

## 2023-12-05 PROCEDURE — 28470 CLTX METATARSAL FX WO MNP EA: CPT | Mod: PBBFAC | Performed by: ORTHOPAEDIC SURGERY

## 2023-12-05 PROCEDURE — 99202 PR OFFICE/OUTPT VISIT, NEW, LEVL II, 15-29 MIN: ICD-10-PCS | Mod: S$PBB,57,, | Performed by: ORTHOPAEDIC SURGERY

## 2023-12-05 PROCEDURE — 1159F PR MEDICATION LIST DOCUMENTED IN MEDICAL RECORD: ICD-10-PCS | Mod: ,,, | Performed by: ORTHOPAEDIC SURGERY

## 2023-12-05 PROCEDURE — 99214 OFFICE O/P EST MOD 30 MIN: CPT | Mod: PBBFAC,25 | Performed by: ORTHOPAEDIC SURGERY

## 2023-12-05 PROCEDURE — 3008F PR BODY MASS INDEX (BMI) DOCUMENTED: ICD-10-PCS | Mod: ,,, | Performed by: ORTHOPAEDIC SURGERY

## 2023-12-05 PROCEDURE — 1159F MED LIST DOCD IN RCRD: CPT | Mod: ,,, | Performed by: ORTHOPAEDIC SURGERY

## 2023-12-05 PROCEDURE — 99202 OFFICE O/P NEW SF 15 MIN: CPT | Mod: S$PBB,57,, | Performed by: ORTHOPAEDIC SURGERY

## 2023-12-05 PROCEDURE — 28470 PR CLOSED RX METATARSAL FX: ICD-10-PCS | Mod: S$PBB,LT,, | Performed by: ORTHOPAEDIC SURGERY

## 2023-12-05 PROCEDURE — 28470 CLTX METATARSAL FX WO MNP EA: CPT | Mod: S$PBB,LT,, | Performed by: ORTHOPAEDIC SURGERY

## 2023-12-05 PROCEDURE — 3008F BODY MASS INDEX DOCD: CPT | Mod: ,,, | Performed by: ORTHOPAEDIC SURGERY

## 2023-12-05 NOTE — PROGRESS NOTES
CC:   Chief Complaint   Patient presents with    Left Foot - Injury     2ND MT FX        PREVIOUS INFO:        HISTORY:   2023    Rubia Contreras  is a 48 y.o. had a 2 x 8 fall and hit her left foot a couple weeks ago having severe pain she is concerned she is got RSD she was diagnosed with a 2nd metatarsal fracture nondisplaced placed him in a boot and crutches and referred she has a history of having RSD type symptoms to her left upper extremity after an injury treated previously by Dr. Lazaro      PAST MEDICAL HISTORY:   Past Medical History:   Diagnosis Date    Arthritis     Heart burn     Hyperlipidemia     Migraines     Neuromuscular disorder     Raynaud's syndrome           PAST SURGICAL HISTORY:   Past Surgical History:   Procedure Laterality Date    ADENOIDECTOMY       SECTION      HYSTERECTOMY      total with 1 ovary preservation    NERVE GRAFT Left     nerve removed from left ankle to repair radial nerve in left wrist    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    TONSILLECTOMY            ALLERGIES: Review of patient's allergies indicates:  No Known Allergies     MEDICATIONS :    Current Outpatient Medications:     ALPRAZolam (XANAX) 0.25 MG tablet, Take 1 tablet (0.25 mg total) by mouth 3 (three) times daily., Disp: 45 tablet, Rfl: 0    atorvastatin (LIPITOR) 20 MG tablet, Take 1 tablet (20 mg total) by mouth once daily., Disp: 90 tablet, Rfl: 1    cyanocobalamin 1,000 mcg/mL injection, Take 1 ml subcutaneous injection weekly for 6 weeks and then take 1 ml subcutaneous injection once a month for 4 months., Disp: 10 mL, Rfl: 0    cyclobenzaprine (FLEXERIL) 5 MG tablet, Take 5 mg by mouth nightly as needed., Disp: , Rfl:     ergocalciferol (ERGOCALCIFEROL) 50,000 unit Cap, Take 1 capsule (50,000 Units total) by mouth every 7 days., Disp: 12 capsule, Rfl: 0    gabapentin (NEURONTIN) 300 MG capsule, Take 1 capsule  (300 mg total) by mouth 2 (two) times daily., Disp: 60 capsule, Rfl: 0    HYDROcodone-acetaminophen (NORCO) 7.5-325 mg per tablet, Take 1 tablet by mouth every 6 (six) hours as needed for Pain., Disp: 12 tablet, Rfl: 0    ondansetron (ZOFRAN) 4 MG tablet, Take 1 tablet (4 mg total) by mouth every 6 (six) hours., Disp: 12 tablet, Rfl: 0    pantoprazole (PROTONIX) 40 MG tablet, Take 1 tablet (40 mg total) by mouth once daily., Disp: 30 tablet, Rfl: 2    polyethylene glycol (GLYCOLAX) 17 gram PwPk, Take 17 g by mouth once daily., Disp: 30 each, Rfl: 1    sumatriptan (IMITREX) 100 MG tablet, Take 1 tablet (100 mg total) by mouth as needed for Migraine., Disp: 30 tablet, Rfl: 5    topiramate (TOPAMAX) 100 MG tablet, Take 1 tablet (100 mg total) by mouth 2 (two) times a day., Disp: 60 tablet, Rfl: 5     SOCIAL HISTORY:   Social History     Socioeconomic History    Marital status:     Number of children: 3   Tobacco Use    Smoking status: Every Day     Current packs/day: 0.00     Average packs/day: 0.5 packs/day for 25.0 years (12.5 ttl pk-yrs)     Types: Cigarettes, Vaping with nicotine     Start date: 1993     Last attempt to quit: 2018     Years since quittin.9     Passive exposure: Past    Smokeless tobacco: Never   Substance and Sexual Activity    Alcohol use: Yes     Alcohol/week: 7.0 standard drinks of alcohol     Types: 7 Drinks containing 0.5 oz of alcohol per week     Comment: 2 mixed drinks per week    Drug use: Not Currently     Types: Hydrocodone     Comment: past use    Sexual activity: Yes     Partners: Male     Birth control/protection: See Surgical Hx        ROS    FAMILY HISTORY:   Family History   Problem Relation Age of Onset    Hypothyroidism Mother     Cirrhosis Mother     Hypertension Father     Atrial fibrillation Father     Pancreatitis Brother     No Known Problems Daughter     Cerebral palsy Daughter     No Known Problems Son           PHYSICAL EXAM: There were no vitals filed for  this visit.            Body mass index is 28.7 kg/m².     In general, this is a well-developed, well-nourished female . The patient is alert, oriented and cooperative.      HEENT:  Normocephalic, atraumatic.  Extraocular movements are intact bilaterally.  The oropharynx is benign.       NECK:  Nontender with good range of motion.      PULMONARY: Respirations are even and non-labored.       CARDIOVASCULAR: Pulses regular by peripheral palpation.       ABDOMEN:  Soft, non-tender, non-distended.        EXTREMITIES:  The left foot is no longer bruised or swollen color was a good today she says it changes call her in his acting just like her did she is very concerned she is developing RSD    Ortho Exam      RADIOGRAPHIC FINDINGS:  X-rays reviewed from the emergency room nondisplaced 2nd metatarsal fracture      .      IMPRESSION:  Nondisplaced 2nd metatarsal shaft fracture discussed with the patient she may over the may have the breaks were not seen we will see if new we are going to treat her this is totally nondisplaced she already has a boot and crutches think that is fine we will continue those she puts weight on her heel I do want her to start lotion massage in her foot multiple times a day with her history of RSD to another extremity    PLAN:  Continue boot and crutches she can put weight on her heel lotion massage and we will do a referral over to [facility] patient is concerned she is developing RSD she is already had left upper extremity previously  Follow-up x-ray in a month left foot      No follow-ups on file.         Wes Tolbert III      (Subject to voice recognition error, transcription service not allowed)

## 2023-12-11 NOTE — ASSESSMENT & PLAN NOTE
Reviewed pathology of current symptoms, medication side effects/risk/benefits/directions on taking medications, and to take medication as prescribed. Take medication as prescribed. Remain upright for at least one hour after eating or drinking, raise HOB 6-8 inches. Eat small, frequent bland meals instead of large meals. Avoid greasy/spicy/acidic foods. Avoid alcohol and NSAIDS. Monitor for changes in bowel color, texture, etc. Discussed what warrants emergent follow-up or treatment. Patient is to go to ED if they begin vomiting and it looks like blood or coffee grounds. Weight loss may help people who are overweight to reduce acid reflux. Weight loss has a number of other health benefits including decreases risk of heart disease and type 2 DM.

## 2023-12-11 NOTE — PROGRESS NOTES
ELIZABETH JOHNSON   [facility]  [address]  [place]  [address]      PATIENT NAME: Rubia Contreras  : 1975  DATE: 10/3/23  MRN: [id number]        Reason for Visit / Chief Complaint: Follow-up (3 months follow up. ) and GI Problem (Pt c/o having a stomach pain. Pt reports It feel like she has heart burn or acid reflux and pt also  she cant use the bathroom for several days pt says she has to take medication for her to use the bathroom. )         History of Present Illness / Problem Focused Workflow       49 y/o female presents to clinic for Follow-up (3 months follow up. ) and GI Problem (Pt c/o having a stomach pain. Pt reports It feel like she has heart burn or acid reflux and pt also  she cant use the bathroom for several days pt says she has to take medication for her to use the bathroom. )    Review of Systems     @Review of Systems   Constitutional:  Negative for chills, fatigue and fever.   HENT:  Negative for nasal congestion, ear discharge, ear pain, rhinorrhea, sinus pressure/congestion and sore throat.    Respiratory:  Negative for cough, chest tightness, shortness of breath, wheezing and stridor.    Cardiovascular:  Negative for palpitations and claudication.   Gastrointestinal:  Negative for abdominal pain, constipation, diarrhea, nausea, vomiting and reflux.   Genitourinary:  Negative for dysuria, flank pain, frequency, hematuria and urgency.   Musculoskeletal:  Negative for myalgias.   Neurological:  Negative for dizziness, weakness, light-headedness and headaches.   Psychiatric/Behavioral:  Negative for suicidal ideas.        Medical / Social / Family History     Past Medical History:   Diagnosis Date    Arthritis     Heart burn     Hyperlipidemia     Migraines     Neuromuscular disorder     Raynaud's syndrome        Past Surgical History:   Procedure Laterality Date    ADENOIDECTOMY       SECTION      HYSTERECTOMY      total with 1 ovary preservation     NERVE GRAFT Left     nerve removed from left ankle to repair radial nerve in left wrist    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    TONSILLECTOMY             Medications and Allergies     Medications  Outpatient Medications Marked as Taking for the 10/3/23 encounter (Office Visit) with Jaron Hugo FNP   Medication Sig Dispense Refill    ALPRAZolam (XANAX) 0.25 MG tablet Take 1 tablet (0.25 mg total) by mouth 3 (three) times daily. 45 tablet 0    atorvastatin (LIPITOR) 20 MG tablet Take 1 tablet (20 mg total) by mouth once daily. 90 tablet 1    cyclobenzaprine (FLEXERIL) 5 MG tablet Take 5 mg by mouth nightly as needed.      sumatriptan (IMITREX) 100 MG tablet Take 1 tablet (100 mg total) by mouth as needed for Migraine. 30 tablet 5    topiramate (TOPAMAX) 100 MG tablet Take 1 tablet (100 mg total) by mouth 2 (two) times a day. 60 tablet 5    [DISCONTINUED] famotidine (PEPCID) 20 MG tablet Take 20 mg by mouth 2 (two) times daily.         Allergies  Review of patient's allergies indicates:  No Known Allergies    Physical Examination     Vitals:    10/03/23 0955   BP: 107/73   Pulse: (!) 54   Resp: 12   Temp: 98.2 °F (36.8 °C)     Physical Exam  Vitals and nursing note reviewed.   Constitutional:       General: She is awake.      Appearance: Normal appearance. She is obese.   HENT:      Head: Normocephalic.      Right Ear: Tympanic membrane, ear canal and external ear normal.      Left Ear: Tympanic membrane, ear canal and external ear normal.      Nose: Nose normal.      Mouth/Throat:      Lips: Pink.      Mouth: Mucous membranes are moist.      Pharynx: Oropharynx is clear. Uvula midline.   Cardiovascular:      Rate and Rhythm: Normal rate and regular rhythm.      Heart sounds: Normal heart sounds, S1 normal and S2 normal.   Pulmonary:      Effort: Pulmonary effort is normal. No respiratory distress.      Breath sounds: Normal breath sounds. No  "decreased breath sounds, wheezing, rhonchi or rales.   Abdominal:      General: Bowel sounds are normal.      Palpations: Abdomen is soft.      Tenderness: There is no abdominal tenderness.   Musculoskeletal:      Cervical back: Normal range of motion.   Skin:     General: Skin is warm.      Capillary Refill: Capillary refill takes less than 2 seconds.   Neurological:      Mental Status: She is alert and oriented to person, place, and time.   Psychiatric:         Thought Content: Thought content does not include homicidal or suicidal ideation. Thought content does not include homicidal or suicidal plan.               Procedures   Assessment and Plan (including Health Maintenance)   :    Plan:     Problem List Items Addressed This Visit          Neuro    Intractable migraine without aura and with status migrainosus    Current Assessment & Plan     Headaches are controlled better since last visit. Continue current medication regimen.             Cardiac/Vascular    Hyperlipidemia - Primary    Current Assessment & Plan     Lab Results   Component Value Date    LDLCALC 117 10/03/2023   Lipid panel obtained at today's visit. Goal LDL is less than 70. Continue current meds and low fat/low cholesterol diet with increased exercise as tolerated. Will follow up with labs. Patient to follow up in 3 months or as needed    A few changes in your diet can reduce cholesterol and improve your heart health. Saturated fats, found primarily in red meat and full-fat dairy products, raise your total cholesterol. Decreasing your consumption of saturated fats can reduce your low-density lipoprotein (LDL) cholesterol. rans fats, sometimes listed on food labels as "partially hydrogenated vegetable oil," are often used in margarines and store-bought cookies, crackers and cakes.     Trans fats raise overall cholesterol levels. Omega-3 fatty acids don't affect LDL cholesterol. But they have other heart-healthy benefits, including reducing blood " pressure. Foods with omega-3 fatty acids include salmon, mackerel, herring, walnuts and flaxseeds.Soluble fiber can reduce the absorption of cholesterol into your bloodstream. Soluble fiber is found in such foods as oatmeal, kidney beans, [place] sprouts, apples and pears.  at least 30 minutes of exercise five times a week or vigorous aerobic activity for 20 minutes three times a week if tolerated.            Relevant Orders    CBC Auto Differential (Completed)    Comprehensive Metabolic Panel (Completed)    TSH (Completed)    T4, Free (Completed)    Lipid Panel (Completed)       Endocrine    Obesity (BMI 30-39.9)    Current Assessment & Plan      checked and is OK. No signs of aberrant behavior. Instructed patient to only take medication as instructed and never share medications with others. Will start on Adipex. Instructed patient this is only a tool to help lose weight in addition to diet and exercise. Encouraged patient to use apps such as My Fitness Pal, GOLO, or [employer]. Instructed patient to follow up in one month for weigh in and to discuss effectiveness.  Instructed patient to follow up in one month for weigh in and to discuss effectiveness.          Vitamin B 12 deficiency    Current Assessment & Plan     Vitamin B12 IM today. Pt tolerated well         Relevant Medications    cyanocobalamin 1,000 mcg/mL injection    Other Relevant Orders    Vitamin B12 & Folate (Completed)       GI    Gastroesophageal reflux disease without esophagitis    Current Assessment & Plan     Reviewed pathology of current symptoms, medication side effects/risk/benefits/directions on taking medications, and to take medication as prescribed. Take medication as prescribed. Remain upright for at least one hour after eating or drinking, raise HOB 6-8 inches. Eat small, frequent bland meals instead of large meals. Avoid greasy/spicy/acidic foods. Avoid alcohol and NSAIDS. Monitor for changes in bowel color, texture, etc. Discussed what  warrants emergent follow-up or treatment. Patient is to go to ED if they begin vomiting and it looks like blood or coffee grounds. Weight loss may help people who are overweight to reduce acid reflux. Weight loss has a number of other health benefits including decreases risk of heart disease and type 2 DM.         Relevant Medications    pantoprazole (PROTONIX) 40 MG tablet    Other Relevant Orders    MOO EIA w/ Reflex to dsDNA/WILLY (Completed)     Other Visit Diagnoses       Vitamin D deficiency        Relevant Orders    Vitamin D (Completed)            Health Maintenance Topics with due status: Not Due       Topic Last Completion Date    Colorectal Cancer Screening 05/17/2023    Mammogram 07/11/2023    Lipid Panel 10/03/2023       Future Appointments   Date Time Provider Department Center   1/5/2024  9:40 AM Wes Tolbert III, MD [facility]   6/25/2024  8:30 AM Jimena Montez NP [facility]   7/17/2024 10:45 AM [facility]        Health Maintenance Due   Topic Date Due    Hepatitis C Screening  Never done    Pneumococcal Vaccines (Age 0-64) (1 - PCV) Never done    Hemoglobin A1c (Diabetic Prevention Screening)  Never done    Influenza Vaccine (1) 09/01/2023        Follow up in about 4 weeks (around 10/31/2023).     Signature:  ELIZABETH JOHNSON  [facility]  [address] [address] [place]  [address]    Date of encounter: 10/3/23

## 2023-12-11 NOTE — ASSESSMENT & PLAN NOTE
"Lab Results   Component Value Date    LDLCALC 117 10/03/2023     Lipid panel obtained at today's visit. Goal LDL is less than 70. Continue current meds and low fat/low cholesterol diet with increased exercise as tolerated. Will follow up with labs. Patient to follow up in 3 months or as needed    A few changes in your diet can reduce cholesterol and improve your heart health. Saturated fats, found primarily in red meat and full-fat dairy products, raise your total cholesterol. Decreasing your consumption of saturated fats can reduce your low-density lipoprotein (LDL) cholesterol. rans fats, sometimes listed on food labels as "partially hydrogenated vegetable oil," are often used in margarines and store-bought cookies, crackers and cakes.     Trans fats raise overall cholesterol levels. Omega-3 fatty acids don't affect LDL cholesterol. But they have other heart-healthy benefits, including reducing blood pressure. Foods with omega-3 fatty acids include salmon, mackerel, herring, walnuts and flaxseeds.Soluble fiber can reduce the absorption of cholesterol into your bloodstream. Soluble fiber is found in such foods as oatmeal, kidney beans, [place] sprouts, apples and pears.  at least 30 minutes of exercise five times a week or vigorous aerobic activity for 20 minutes three times a week if tolerated.     "

## 2023-12-11 NOTE — ASSESSMENT & PLAN NOTE
checked and is OK. No signs of aberrant behavior. Instructed patient to only take medication as instructed and never share medications with others. Will start on Adipex. Instructed patient this is only a tool to help lose weight in addition to diet and exercise. Encouraged patient to use apps such as My Fitness Pal, [employer]OL[employer], or [employer]. Instructed patient to follow up in one month for weigh in and to discuss effectiveness.  Instructed patient to follow up in one month for weigh in and to discuss effectiveness.

## 2024-01-03 DIAGNOSIS — S92.325A CLOSED NONDISPLACED FRACTURE OF SECOND METATARSAL BONE OF LEFT FOOT, INITIAL ENCOUNTER: Primary | ICD-10-CM

## 2024-01-05 ENCOUNTER — HOSPITAL ENCOUNTER (OUTPATIENT)
Dept: RADIOLOGY | Facility: HOSPITAL | Age: 49
Discharge: HOME | End: 2024-01-05
Attending: ORTHOPAEDIC SURGERY
Payer: COMMERCIAL

## 2024-01-05 ENCOUNTER — OFFICE VISIT (OUTPATIENT)
Dept: ORTHOPEDICS | Facility: CLINIC | Age: 49
End: 2024-01-05
Payer: COMMERCIAL

## 2024-01-05 DIAGNOSIS — S92.325A CLOSED NONDISPLACED FRACTURE OF SECOND METATARSAL BONE OF LEFT FOOT, INITIAL ENCOUNTER: ICD-10-CM

## 2024-01-05 DIAGNOSIS — Z09 FOLLOW-UP EXAMINATION, FOLLOWING OTHER SURGERY: Primary | ICD-10-CM

## 2024-01-05 PROCEDURE — 73630 X-RAY EXAM OF FOOT: CPT | Mod: TC,LT

## 2024-01-05 PROCEDURE — 73630 X-RAY EXAM OF FOOT: CPT | Mod: 26,LT,, | Performed by: ORTHOPAEDIC SURGERY

## 2024-01-05 PROCEDURE — 99024 POSTOP FOLLOW-UP VISIT: CPT | Mod: ,,, | Performed by: ORTHOPAEDIC SURGERY

## 2024-01-05 PROCEDURE — 99212 OFFICE O/P EST SF 10 MIN: CPT | Mod: PBBFAC | Performed by: ORTHOPAEDIC SURGERY

## 2024-01-05 NOTE — PROGRESS NOTES
CC:    Chief Complaint   Patient presents with    Left Foot - Pain     2ND MT FX LT FOOT- DOI 12/1               Previos History :    HISTORY:   12/5/2023    Rubia Contreras  is a 48 y.o. had a 2 x 8 fall and hit her left foot a couple weeks ago having severe pain she is concerned she is got RSD she was diagnosed with a 2nd metatarsal fracture nondisplaced placed him in a boot and crutches and referred she has a history of having RSD type symptoms to her left upper extremity after an injury treated previously by Dr. Lazaro            History:  1/5/2024   Rubia Contreras is a 48 y.o.  status post follow-up left foot 3rd metatarsal fracture we saw her a month ago her injury was a few weeks prior to that she has been in a boot and on crutches        PE:   Foot is not hypersensitive foot is nontender very minimally tender      Radiology:  Left foot AP lateral oblique views new bone formation healing 3rd metatarsal shaft midshaft fracture good alignment no other fractures visualized new bone formation present        Ass/Plan:  Healing fracture she did get some blocks up at Dr. Lazaro's office for possible RSD with her history continue the boot she can put a little more weight through it told her listen to her body it starts hurting she is doing too much follow-up x-ray about 4 weeks        Wes Tolbert III, MD    Subject to voice recognition errors,  transcription services are not allowed

## 2024-01-08 DIAGNOSIS — G43.011 INTRACTABLE MIGRAINE WITHOUT AURA AND WITH STATUS MIGRAINOSUS: ICD-10-CM

## 2024-01-08 DIAGNOSIS — E78.5 HYPERLIPIDEMIA, UNSPECIFIED HYPERLIPIDEMIA TYPE: ICD-10-CM

## 2024-01-08 DIAGNOSIS — K21.9 GASTROESOPHAGEAL REFLUX DISEASE WITHOUT ESOPHAGITIS: ICD-10-CM

## 2024-01-08 RX ORDER — ATORVASTATIN CALCIUM 20 MG/1
20 TABLET, FILM COATED ORAL DAILY
Qty: 90 TABLET | Refills: 1 | Status: SHIPPED | OUTPATIENT
Start: 2024-01-08

## 2024-01-08 RX ORDER — TOPIRAMATE 100 MG/1
100 TABLET, FILM COATED ORAL 2 TIMES DAILY
Qty: 180 TABLET | Refills: 1 | Status: SHIPPED | OUTPATIENT
Start: 2024-01-08

## 2024-01-08 RX ORDER — PANTOPRAZOLE SODIUM 40 MG/1
40 TABLET, DELAYED RELEASE ORAL DAILY
Qty: 90 TABLET | Refills: 1 | Status: SHIPPED | OUTPATIENT
Start: 2024-01-08

## 2024-02-09 DIAGNOSIS — S92.325A CLOSED NONDISPLACED FRACTURE OF SECOND METATARSAL BONE OF LEFT FOOT, INITIAL ENCOUNTER: Primary | ICD-10-CM

## 2024-02-13 ENCOUNTER — OFFICE VISIT (OUTPATIENT)
Dept: ORTHOPEDICS | Facility: CLINIC | Age: 49
End: 2024-02-13
Payer: COMMERCIAL

## 2024-02-13 ENCOUNTER — HOSPITAL ENCOUNTER (OUTPATIENT)
Dept: RADIOLOGY | Facility: HOSPITAL | Age: 49
Discharge: HOME | End: 2024-02-13
Attending: ORTHOPAEDIC SURGERY
Payer: COMMERCIAL

## 2024-02-13 DIAGNOSIS — Z09 FOLLOW-UP EXAMINATION, FOLLOWING OTHER SURGERY: Primary | ICD-10-CM

## 2024-02-13 DIAGNOSIS — S92.325A CLOSED NONDISPLACED FRACTURE OF SECOND METATARSAL BONE OF LEFT FOOT, INITIAL ENCOUNTER: ICD-10-CM

## 2024-02-13 PROCEDURE — 73630 X-RAY EXAM OF FOOT: CPT | Mod: TC,LT

## 2024-02-13 PROCEDURE — 99212 OFFICE O/P EST SF 10 MIN: CPT | Mod: PBBFAC,25 | Performed by: ORTHOPAEDIC SURGERY

## 2024-02-13 PROCEDURE — 99024 POSTOP FOLLOW-UP VISIT: CPT | Mod: ,,, | Performed by: ORTHOPAEDIC SURGERY

## 2024-02-13 PROCEDURE — 73630 X-RAY EXAM OF FOOT: CPT | Mod: 26,LT,, | Performed by: ORTHOPAEDIC SURGERY

## 2024-02-13 NOTE — PROGRESS NOTES
CC:    Chief Complaint   Patient presents with    Left Foot - Injury     2ND MT FX DOI 12/1-10 WKS           Previos History :      HISTORY:   12/5/2023    Rubia Contreras  is a 48 y.o. had a 2 x 8 fall and hit her left foot a couple weeks ago having severe pain she is concerned she is got RSD she was diagnosed with a 2nd metatarsal fracture nondisplaced placed him in a boot and crutches and referred she has a history of having RSD type symptoms to her left upper extremity after an injury treated previously by Dr. Lazaro              History:  1/5/2024   Rubia Contreras is a 48 y.o.  status post follow-up left foot 3rd metatarsal fracture we saw her a month ago her injury was a few weeks prior to that she has been in a boot and on crutches         History:  2/13/2024   Rubia Contreras is a 48 y.o.  status post follow-up 2nd metatarsal fracture  says it does not hurt anymore she has been going without her boot at inside wearing the boot outside      PE:   Nontender on exam no swelling over the 2nd metatarsal left foot      Radiology:  Left foot AP lateral oblique views 2nd distal metatarsal shaft fracture new bone formation progressively healing fracture        Ass/Plan:  Continue supportive good supportive shoe went over good tennis shoe with her arm and if it starts hurting go back in the boot gave her a follow-up in a month if she is doing well she can call        Wes Tolbert III, MD    Subject to voice recognition errors,  transcription services are not allowed

## 2024-06-26 ENCOUNTER — OFFICE VISIT (OUTPATIENT)
Dept: FAMILY MEDICINE | Facility: CLINIC | Age: 49
End: 2024-06-26
Payer: COMMERCIAL

## 2024-06-26 VITALS
DIASTOLIC BLOOD PRESSURE: 74 MMHG | HEART RATE: 80 BPM | BODY MASS INDEX: 26.19 KG/M2 | OXYGEN SATURATION: 96 % | RESPIRATION RATE: 18 BRPM | WEIGHT: 147.81 LBS | TEMPERATURE: 98 F | HEIGHT: 63 IN | SYSTOLIC BLOOD PRESSURE: 108 MMHG

## 2024-06-26 DIAGNOSIS — M26.629 TEMPOROMANDIBULAR JOINT-PAIN-DYSFUNCTION SYNDROME (TMJ): ICD-10-CM

## 2024-06-26 DIAGNOSIS — G43.011 INTRACTABLE MIGRAINE WITHOUT AURA AND WITH STATUS MIGRAINOSUS: ICD-10-CM

## 2024-06-26 DIAGNOSIS — D22.9 CHANGE IN COLOR OF SKIN MOLE: ICD-10-CM

## 2024-06-26 DIAGNOSIS — E55.9 VITAMIN D DEFICIENCY: ICD-10-CM

## 2024-06-26 DIAGNOSIS — E53.8 VITAMIN B 12 DEFICIENCY: ICD-10-CM

## 2024-06-26 DIAGNOSIS — Z00.00 ROUTINE GENERAL MEDICAL EXAMINATION AT A HEALTH CARE FACILITY: Primary | ICD-10-CM

## 2024-06-26 DIAGNOSIS — E78.2 MIXED HYPERLIPIDEMIA: ICD-10-CM

## 2024-06-26 DIAGNOSIS — K21.9 GASTROESOPHAGEAL REFLUX DISEASE WITHOUT ESOPHAGITIS: ICD-10-CM

## 2024-06-26 LAB
25(OH)D3 SERPL-MCNC: 28.7 NG/ML
ALBUMIN SERPL BCP-MCNC: 4.3 G/DL (ref 3.5–5)
ALBUMIN/GLOB SERPL: 1.2 {RATIO}
ALP SERPL-CCNC: 82 U/L (ref 39–100)
ALT SERPL W P-5'-P-CCNC: 19 U/L (ref 13–56)
ANION GAP SERPL CALCULATED.3IONS-SCNC: 10 MMOL/L (ref 7–16)
AST SERPL W P-5'-P-CCNC: 12 U/L (ref 15–37)
BASOPHILS # BLD AUTO: 0.04 K/UL (ref 0–0.2)
BASOPHILS NFR BLD AUTO: 1 % (ref 0–1)
BILIRUB SERPL-MCNC: 0.3 MG/DL (ref ?–1.2)
BUN SERPL-MCNC: 23 MG/DL (ref 7–18)
BUN/CREAT SERPL: 22 (ref 6–20)
CALCIUM SERPL-MCNC: 9.4 MG/DL (ref 8.5–10.1)
CHLORIDE SERPL-SCNC: 113 MMOL/L (ref 98–107)
CHOLEST SERPL-MCNC: 262 MG/DL (ref 0–200)
CHOLEST/HDLC SERPL: 4.9 {RATIO}
CO2 SERPL-SCNC: 22 MMOL/L (ref 21–32)
CREAT SERPL-MCNC: 1.06 MG/DL (ref 0.55–1.02)
DIFFERENTIAL METHOD BLD: ABNORMAL
EGFR (NO RACE VARIABLE) (RUSH/TITUS): 65 ML/MIN/1.73M2
EOSINOPHIL # BLD AUTO: 0.08 K/UL (ref 0–0.5)
EOSINOPHIL NFR BLD AUTO: 1.9 % (ref 1–4)
ERYTHROCYTE [DISTWIDTH] IN BLOOD BY AUTOMATED COUNT: 13.9 % (ref 11.5–14.5)
FOLATE SERPL-MCNC: 11 NG/ML (ref 3.1–17.5)
GLOBULIN SER-MCNC: 3.6 G/DL (ref 2–4)
GLUCOSE SERPL-MCNC: 83 MG/DL (ref 74–106)
HCT VFR BLD AUTO: 46.2 % (ref 38–47)
HDLC SERPL-MCNC: 53 MG/DL (ref 40–60)
HGB BLD-MCNC: 14.4 G/DL (ref 12–16)
IMM GRANULOCYTES # BLD AUTO: 0.01 K/UL (ref 0–0.04)
IMM GRANULOCYTES NFR BLD: 0.2 % (ref 0–0.4)
LDLC SERPL CALC-MCNC: 181 MG/DL
LDLC/HDLC SERPL: 3.4 {RATIO}
LYMPHOCYTES # BLD AUTO: 1.21 K/UL (ref 1–4.8)
LYMPHOCYTES NFR BLD AUTO: 28.7 % (ref 27–41)
MCH RBC QN AUTO: 30.9 PG (ref 27–31)
MCHC RBC AUTO-ENTMCNC: 31.2 G/DL (ref 32–36)
MCV RBC AUTO: 99.1 FL (ref 80–96)
MONOCYTES # BLD AUTO: 0.28 K/UL (ref 0–0.8)
MONOCYTES NFR BLD AUTO: 6.7 % (ref 2–6)
MPC BLD CALC-MCNC: 9.7 FL (ref 9.4–12.4)
NEUTROPHILS # BLD AUTO: 2.59 K/UL (ref 1.8–7.7)
NEUTROPHILS NFR BLD AUTO: 61.5 % (ref 53–65)
NONHDLC SERPL-MCNC: 209 MG/DL
NRBC # BLD AUTO: 0 X10E3/UL
NRBC, AUTO (.00): 0 %
PLATELET # BLD AUTO: 290 K/UL (ref 150–400)
POTASSIUM SERPL-SCNC: 3.9 MMOL/L (ref 3.5–5.1)
PROT SERPL-MCNC: 7.9 G/DL (ref 6.4–8.2)
RBC # BLD AUTO: 4.66 M/UL (ref 4.2–5.4)
SODIUM SERPL-SCNC: 141 MMOL/L (ref 136–145)
TRIGL SERPL-MCNC: 140 MG/DL (ref 35–150)
TSH SERPL DL<=0.005 MIU/L-ACNC: 2.9 UIU/ML (ref 0.36–3.74)
VIT B12 SERPL-MCNC: 1297 PG/ML (ref 193–986)
VLDLC SERPL-MCNC: 28 MG/DL
WBC # BLD AUTO: 4.21 K/UL (ref 4.5–11)

## 2024-06-26 PROCEDURE — 3078F DIAST BP <80 MM HG: CPT | Mod: ,,,

## 2024-06-26 PROCEDURE — 80050 GENERAL HEALTH PANEL: CPT | Mod: ,,, | Performed by: CLINICAL MEDICAL LABORATORY

## 2024-06-26 PROCEDURE — 1160F RVW MEDS BY RX/DR IN RCRD: CPT | Mod: ,,,

## 2024-06-26 PROCEDURE — 80061 LIPID PANEL: CPT | Mod: ,,, | Performed by: CLINICAL MEDICAL LABORATORY

## 2024-06-26 PROCEDURE — 99396 PREV VISIT EST AGE 40-64: CPT | Mod: ,,,

## 2024-06-26 PROCEDURE — 82607 VITAMIN B-12: CPT | Mod: ,,, | Performed by: CLINICAL MEDICAL LABORATORY

## 2024-06-26 PROCEDURE — 3008F BODY MASS INDEX DOCD: CPT | Mod: ,,,

## 2024-06-26 PROCEDURE — 82746 ASSAY OF FOLIC ACID SERUM: CPT | Mod: ,,, | Performed by: CLINICAL MEDICAL LABORATORY

## 2024-06-26 PROCEDURE — 1159F MED LIST DOCD IN RCRD: CPT | Mod: ,,,

## 2024-06-26 PROCEDURE — 82306 VITAMIN D 25 HYDROXY: CPT | Mod: ,,, | Performed by: CLINICAL MEDICAL LABORATORY

## 2024-06-26 PROCEDURE — 3074F SYST BP LT 130 MM HG: CPT | Mod: ,,,

## 2024-06-26 RX ORDER — CYCLOBENZAPRINE HCL 5 MG
5 TABLET ORAL 2 TIMES DAILY PRN
Qty: 60 TABLET | Refills: 1 | Status: SHIPPED | OUTPATIENT
Start: 2024-06-26

## 2024-06-26 RX ORDER — TOPIRAMATE 100 MG/1
100 TABLET, FILM COATED ORAL 2 TIMES DAILY
Qty: 180 TABLET | Refills: 1 | Status: SHIPPED | OUTPATIENT
Start: 2024-06-26

## 2024-06-26 RX ORDER — CYCLOBENZAPRINE HCL 5 MG
5 TABLET ORAL NIGHTLY
COMMUNITY
Start: 2024-04-23 | End: 2024-06-26

## 2024-06-26 RX ORDER — PANTOPRAZOLE SODIUM 40 MG/1
40 TABLET, DELAYED RELEASE ORAL DAILY
Qty: 90 TABLET | Refills: 1 | Status: SHIPPED | OUTPATIENT
Start: 2024-06-26

## 2024-06-26 RX ORDER — SUMATRIPTAN SUCCINATE 100 MG/1
100 TABLET ORAL
Qty: 30 TABLET | Refills: 5 | Status: SHIPPED | OUTPATIENT
Start: 2024-06-26

## 2024-06-26 NOTE — PROGRESS NOTES
Health Maintenance Due   Topic Date Due    Hepatitis C Screening  Never done    Pneumococcal Vaccines (Age 0-64) (1 of 2 - PCV) Never done    TETANUS VACCINE  Never done    Hemoglobin A1c (Diabetic Prevention Screening)  Never done    COVID-19 Vaccine (1 - 2023-24 season) Never done    Mammogram  07/11/2024     I discussed care gaps with patient and she deferred at this time.

## 2024-06-26 NOTE — PROGRESS NOTES
Subjective     Patient ID: Rubia Contreras is a 49 y.o. female.    Chief Complaint: Health Maintenance (Patient is Rubia Contreras a 48 y/o female that is here for a [place] Healthy [place]. Patient states she has been doing well she just needs a check up and wants extensive blood work.) and Medication Refill (Patient reports she needs a refill on her medications.)    48 y/o female presents to clinic for Healthy You Visit.       Review of Systems   Constitutional:  Negative for chills, fatigue and fever.   HENT:  Negative for nasal congestion, ear discharge, ear pain, rhinorrhea, sinus pressure/congestion and sore throat.    Respiratory:  Negative for cough, chest tightness, shortness of breath, wheezing and stridor.    Cardiovascular:  Negative for palpitations and claudication.   Gastrointestinal:  Negative for abdominal pain, constipation, diarrhea, nausea, vomiting and reflux.   Genitourinary:  Negative for dysuria, flank pain, frequency, hematuria and urgency.   Musculoskeletal:  Negative for myalgias.   Integumentary:  Positive for mole/lesion.   Neurological:  Negative for dizziness, weakness, light-headedness and headaches.   Psychiatric/Behavioral:  Negative for suicidal ideas.        Tobacco Use: High Risk (6/26/2024)    Patient History     Smoking Tobacco Use: Every Day     Smokeless Tobacco Use: Never     Passive Exposure: Past     Review of patient's allergies indicates:  No Known Allergies  Current Outpatient Medications   Medication Instructions    cyclobenzaprine (FLEXERIL) 5 mg, Oral, 2 times daily PRN    pantoprazole (PROTONIX) 40 mg, Oral, Daily    sumatriptan (IMITREX) 100 mg, Oral, As needed (PRN)    topiramate (TOPAMAX) 100 mg, Oral, 2 times daily     Medications Discontinued During This Encounter   Medication Reason    atorvastatin (LIPITOR) 20 MG tablet Patient no longer taking    cyanocobalamin 1,000 mcg/mL injection     ergocalciferol (ERGOCALCIFEROL) 50,000 unit Cap Patient no  "longer taking    gabapentin (NEURONTIN) 300 MG capsule Patient no longer taking    HYDROcodone-acetaminophen (NORCO) 7.5-325 mg per tablet Patient no longer taking    ondansetron (ZOFRAN) 4 MG tablet Patient no longer taking    cyclobenzaprine (FLEXERIL) 5 MG tablet     sumatriptan (IMITREX) 100 MG tablet Reorder    pantoprazole (PROTONIX) 40 MG tablet Reorder    topiramate (TOPAMAX) 100 MG tablet Reorder       Past Medical History:   Diagnosis Date    Arthritis     Heart burn     Hyperlipidemia     Migraines     Neuromuscular disorder     Raynaud's syndrome      Health Maintenance Topics with due status: Not Due       Topic Last Completion Date    Influenza Vaccine 03/29/2023    Colorectal Cancer Screening 05/17/2023    Lipid Panel 10/03/2023       There is no immunization history on file for this patient.    Objective     Body mass index is 26.18 kg/m².  Wt Readings from Last 3 Encounters:   06/26/24 67 kg (147 lb 12.8 oz)   12/05/23 73.5 kg (162 lb)   12/01/23 73.5 kg (162 lb)     Ht Readings from Last 3 Encounters:   06/26/24 5' 3" (1.6 m)   12/05/23 5' 3" (1.6 m)   12/01/23 5' 3" (1.6 m)     BP Readings from Last 3 Encounters:   06/26/24 108/74   12/01/23 120/89   11/30/23 120/88     Temp Readings from Last 3 Encounters:   06/26/24 98 °F (36.7 °C) (Oral)   12/01/23 98.5 °F (36.9 °C)   11/30/23 98.1 °F (36.7 °C) (Oral)     Pulse Readings from Last 3 Encounters:   06/26/24 80   12/01/23 92   11/30/23 62     Resp Readings from Last 3 Encounters:   06/26/24 18   12/01/23 18   11/30/23 18     PF Readings from Last 3 Encounters:   No data found for PF       Physical Exam  Vitals and nursing note reviewed.   Constitutional:       General: She is awake.      Appearance: Normal appearance.   HENT:      Head: Normocephalic.      Right Ear: Tympanic membrane, ear canal and external ear normal.      Left Ear: Tympanic membrane, ear canal and external ear normal.      Nose: Nose normal.      Mouth/Throat:      Lips: Pink. "      Mouth: Mucous membranes are moist.      Pharynx: Oropharynx is clear. Uvula midline.   Cardiovascular:      Rate and Rhythm: Normal rate and regular rhythm.      Heart sounds: Normal heart sounds, S1 normal and S2 normal.   Pulmonary:      Effort: Pulmonary effort is normal. No respiratory distress.      Breath sounds: Normal breath sounds. No decreased breath sounds, wheezing, rhonchi or rales.   Abdominal:      General: Bowel sounds are normal.      Palpations: Abdomen is soft.      Tenderness: There is no abdominal tenderness.   Musculoskeletal:      Cervical back: Normal range of motion.   Skin:     General: Skin is warm.      Capillary Refill: Capillary refill takes less than 2 seconds.      Comments: Small darker, irregular shaped mole to right lower back noted.    Neurological:      Mental Status: She is alert and oriented to person, place, and time.   Psychiatric:         Thought Content: Thought content does not include homicidal or suicidal ideation. Thought content does not include homicidal or suicidal plan.         Assessment and Plan     Problem List Items Addressed This Visit          Neuro    Intractable migraine without aura and with status migrainosus     Headaches are controlled better since last visit. Continue current medication regimen.          Relevant Medications    sumatriptan (IMITREX) 100 MG tablet    topiramate (TOPAMAX) 100 MG tablet       ENT    Temporomandibular joint-pain-dysfunction syndrome (TMJ)     Symptoms controlled. Will refill flexeril today         Relevant Medications    cyclobenzaprine (FLEXERIL) 5 MG tablet       Derm    Change in color of skin mole     Referral to dermatology         Relevant Orders    Ambulatory referral/consult to Dermatology       Cardiac/Vascular    Hyperlipidemia       Lipid panel obtained at today's visit. Goal LDL is less than 70. Continue current meds and low fat/low cholesterol diet with increased exercise as tolerated. Will follow up with  "labs. Patient to follow up in 3 months or as needed    A few changes in your diet can reduce cholesterol and improve your heart health. Saturated fats, found primarily in red meat and full-fat dairy products, raise your total cholesterol. Decreasing your consumption of saturated fats can reduce your low-density lipoprotein (LDL) cholesterol. rans fats, sometimes listed on food labels as "partially hydrogenated vegetable oil," are often used in margarines and store-bought cookies, crackers and cakes.     Trans fats raise overall cholesterol levels. Omega-3 fatty acids don't affect LDL cholesterol. But they have other heart-healthy benefits, including reducing blood pressure. Foods with omega-3 fatty acids include salmon, mackerel, herring, walnuts and flaxseeds.Soluble fiber can reduce the absorption of cholesterol into your bloodstream. Soluble fiber is found in such foods as oatmeal, kidney beans, [place] sprouts, apples and pears.  at least 30 minutes of exercise five times a week or vigorous aerobic activity for 20 minutes three times a week if tolerated.            Relevant Orders    Lipid Panel    CBC Auto Differential    Comprehensive Metabolic Panel       Endocrine    Vitamin B 12 deficiency     Lab work today and will call pt with results and any new orders         Relevant Orders    Vitamin B12 & Folate    Vitamin D deficiency    Relevant Orders    Vitamin D       GI    Gastroesophageal reflux disease without esophagitis     Reviewed pathology of current symptoms, medication side effects/risk/benefits/directions on taking medications, and to take medication as prescribed. Take medication as prescribed. Remain upright for at least one hour after eating or drinking, raise HOB 6-8 inches. Eat small, frequent bland meals instead of large meals. Avoid greasy/spicy/acidic foods. Avoid alcohol and NSAIDS. Monitor for changes in bowel color, texture, etc. Discussed what warrants emergent follow-up or treatment. " Patient is to go to ED if they begin vomiting and it looks like blood or coffee grounds. Weight loss may help people who are overweight to reduce acid reflux. Weight loss has a number of other health benefits including decreases risk of heart disease and type 2 DM.         Relevant Medications    pantoprazole (PROTONIX) 40 MG tablet       Other    Routine general medical examination at a health care facility - Primary     Assessment WNL         Relevant Orders    Glucose, Random    TSH       Plan: Instructed patient to keep appts as scheduled.   Instructed on DASH diet and increased exercise. Recommended at least 150 minutes a week with resistance training as tolerated. Instructed on importance of taking all medications as prescribed.   Discussed yearly dental and eye exams.    Discussed use of sun screen, helmets and seat belts.  Gun safety discussed  Sleep discussed  Stay away from tobacco products     Discussed and provided with a screening schedule and personal prevention plan in accordance with USPSTF age appropriate recommendations and screening guidelines.   Education given and reviewed with patient. Counseling and referrals were provided as needed.  After Visit Summary printed and given to patient which includes written education and a list of any referrals if indicated.       I have reviewed the medications, allergies, and problem list.     Goal Actions:    What type of visit is the patient here for today?: Healthy You  Does the patient consent to enroll in [facility]?: Yes  Is this a Wellness Follow Up?: No  What is your overall wellness goal? (select at least one): Improve overall health  Choose 3: Lifestyle, Nutrition, Biometric  Biometric Actions: Attend regularly scheduled office visits  Lifestyle Actions : Obtain yearly mammogram, Pap smear or HPV  Nurtrition Actions: Eat a well-balanced diet, drink 8-10 glasses of water per day

## 2024-06-26 NOTE — ASSESSMENT & PLAN NOTE
"  Lipid panel obtained at today's visit. Goal LDL is less than 70. Continue current meds and low fat/low cholesterol diet with increased exercise as tolerated. Will follow up with labs. Patient to follow up in 3 months or as needed    A few changes in your diet can reduce cholesterol and improve your heart health. Saturated fats, found primarily in red meat and full-fat dairy products, raise your total cholesterol. Decreasing your consumption of saturated fats can reduce your low-density lipoprotein (LDL) cholesterol. rans fats, sometimes listed on food labels as "partially hydrogenated vegetable oil," are often used in margarines and store-bought cookies, crackers and cakes.     Trans fats raise overall cholesterol levels. Omega-3 fatty acids don't affect LDL cholesterol. But they have other heart-healthy benefits, including reducing blood pressure. Foods with omega-3 fatty acids include salmon, mackerel, herring, walnuts and flaxseeds.Soluble fiber can reduce the absorption of cholesterol into your bloodstream. Soluble fiber is found in such foods as oatmeal, kidney beans, [place] sprouts, apples and pears.  at least 30 minutes of exercise five times a week or vigorous aerobic activity for 20 minutes three times a week if tolerated.     "

## 2024-06-26 NOTE — PATIENT INSTRUCTIONS
"Patient Education       High Blood Pressure in Adults   The Basics   Written by the doctors and editors at [facility]   What is high blood pressure? -- High blood pressure is a condition that puts you at risk for heart attack, stroke, and kidney disease. It does not usually cause symptoms. But it can be serious.  When your doctor or nurse tells you your blood pressure, they will say 2 numbers. For instance, your doctor or nurse might say that your blood pressure is "130 over 80." The top number is the pressure inside your arteries when your heart is haseeb. The bottom number is the pressure inside your arteries when your heart is relaxed.  "Elevated blood pressure" is a term doctors or nurses use as a warning. People with elevated blood pressure do not yet have high blood pressure. But their blood pressure is not as low as it should be for good health.  Many experts define high, elevated, and normal blood pressure as follows:  High - Top number of 130 or above and/or bottom number of 80 or above  Elevated - Top number between 120 and 129 and bottom number of 79 or below  Normal - Top number of 119 or below and bottom number of 79 or below  This information is also in the table (table 1).   How can I lower my blood pressure? -- If your doctor or nurse has prescribed blood pressure medicine, the most important thing you can do is to take it. If it causes side effects, do not just stop taking it. Instead, talk to your doctor or nurse about the problems it causes. They might be able to lower your dose or switch you to another medicine. If cost is a problem, mention that too. They might be able to put you on a less expensive medicine. Taking your blood pressure medicine can keep you from having a heart attack or stroke, and it can save your life!  Can I do anything on my own? -- You have a lot of control over your blood pressure. To lower it:  Lose weight (if you are overweight)  Choose a diet low in fat and rich in " "fruits, vegetables, and low-fat dairy products  Reduce the amount of salt you eat  Do something active for at least 30 minutes a day on most days of the week  Cut down on alcohol (if you drink more than 2 alcoholic drinks per day)  It's also a good idea to get a home blood pressure meter. People who check their own blood pressure at home do better at keeping it low and can sometimes even reduce the amount of medicine they take.  All topics are updated as new evidence becomes available and our peer review process is complete.  This topic retrieved from [employer] on: Sep 21, 2021.  Topic 35539 Version 15.0  Release: 29.4.2 - C29.263  © 2021 UpToDate, Inc. and/or its affiliates. All rights reserved.  table 1: Definition of normal and high blood pressure  Level  Top number  Bottom number    High 130 or above 80 or above   Elevated 120 to 129 79 or below   Normal 119 or below 79 or below   These definitions are from the American College of Cardiology/American Heart Association. Other expert groups might use slightly different definitions.  "Elevated blood pressure" is a term doctor or nurses use as a warning. It means you do not yet have high blood pressure, but your blood pressure is not as low as it should be for good health.  Graphic 46420 Version 6.0  Consumer Information Use and Disclaimer   This information is not specific medical advice and does not replace information you receive from your health care provider. This is only a brief summary of general information. It does NOT include all information about conditions, illnesses, injuries, tests, procedures, treatments, therapies, discharge instructions or life-style choices that may apply to you. You must talk with your health care provider for complete information about your health and treatment options. This information should not be used to decide whether or not to accept your health care provider's advice, instructions or recommendations. Only your health care " "provider has the knowledge and training to provide advice that is right for you. The use of this information is governed by the [employer] End User License Agreement, available at https://[email or web address]en/solutions/[employer]/about/lisa.The use of [employer] content is governed by the [employer] Terms of Use. ©2021 UpToDate, Inc. All rights reserved.  Copyright   © 2021 UpToDate, Inc. and/or its affiliates. All rights reserved.    Patient Education       High Cholesterol   The Basics   Written by the doctors and editors at [facility] [employer]   What is cholesterol? -- Cholesterol is a substance that is found in the blood. Everyone has some. It is needed for good health. The problem is, people sometimes have too much cholesterol. Compared with people with normal cholesterol, people with high cholesterol have a higher risk of heart attacks, strokes, and other health problems. The higher your cholesterol, the higher your risk of these problems.  Are there different types of cholesterol? -- Yes, there are a few different types. If you get a cholesterol test, you might hear your doctor or nurse talk about:  Total cholesterol  LDL cholesterol - Some people call this the "bad" cholesterol. That's because having high LDL levels raises your risk of heart attacks, strokes, and other health problems.  HDL cholesterol - Some people call this the "good" cholesterol. That's because people with high HDL levels tend to have a lower risk of heart attacks, strokes, and other health problems.   Non-HDL cholesterol - Non-HDL cholesterol is your total cholesterol minus your HDL cholesterol.  Triglycerides - Triglycerides are not cholesterol. They are another type of fat. But they often get measured when cholesterol is measured. (Having high triglycerides also seems to increase the risk of heart attacks and strokes.)  What should my numbers be? -- Ask your doctor or nurse what your numbers should be. Different people need different goals. (If you " "live outside the [place], see the table (table 1)). In general, people who do not already have heart disease should aim for:  Total cholesterol below 200  LDL cholesterol below 130 - or much lower, if they are at risk of heart attacks or strokes  HDL cholesterol above 60  Non-HDL cholesterol below 160 - or lower, if they are at risk of heart attacks or strokes  Triglycerides below 150  Keep in mind, though, that many people who cannot meet these goals still have a low risk of heart attacks and strokes.  What should I do if my doctor tells me I have high cholesterol? -- Ask your doctor what your overall risk of heart attacks and strokes is. High cholesterol, by itself, is not always a reason to worry. Having high cholesterol is just one of many things that can increase your risk of heart attacks and strokes. Other factors that increase your risk include:  Cigarette smoking  High blood pressure  Having a parent, sister, or brother who got heart disease at a young age - Young, in this case, means younger than 55 for men and younger than 65 for women.  A diet that is not heart healthy - A "heart-healthy" diet includes lots of fruits and vegetables, fiber, and healthy fats (like those found in fish and certain oils). It also means limiting sugar and unhealthy fats.  Older age  If you are at high risk of heart attacks and strokes, having high cholesterol is a problem. On the other hand, if you are at low risk, having high cholesterol might not lead to treatment.  Should I take medicine to lower cholesterol? -- Not everyone who has high cholesterol needs medicines. Your doctor or nurse will decide if you need them based on your age, family history, and other health concerns.  You should probably take a cholesterol-lowering medicine called a statin if you:  Already had a heart attack or stroke  Have known heart disease  Have diabetes  Have a condition called peripheral artery disease, which makes it painful to walk, " and happens when the arteries in your legs get clogged with fatty deposits  Have an abdominal aortic aneurysm, which is a widening of the main artery in the belly  Most people with any of the conditions listed above should take a statin no matter what their cholesterol level is. If your doctor or nurse puts you on a statin, stay on it. The medicine might not make you feel any different. But it can help prevent heart attacks, strokes, and death.  Can I lower my cholesterol without medicines? -- Yes, you can lower your cholesterol some by:  Avoiding red meat, butter, fried foods, cheese, and other foods that have a lot of saturated fat  Losing weight (if you are overweight)  Being more active  Even if these steps do little to change your cholesterol, they can improve your health in many ways.  All topics are updated as new evidence becomes available and our peer review process is complete.  This topic retrieved from [employer] on: Sep 21, 2021.  Topic 80422 Version 19.0  Release: 29.4.2 - C29.263  © 2021 UpToDate, Inc. and/or its affiliates. All rights reserved.  table 1: Cholesterol and triglyceride measurements in the [place] and elsewhere     Measurement used within the [place] Milligrams/deciliter (mg/dL)  Measurement used most places outside the [place] Millimoles/liter (mmol/Liter)     Level to aim for  Level to aim for    Total cholesterol  Below 200 Below 5.17   LDL cholesterol  Below 130 - or much lower if at risk of heart attack and stroke Below 3.36 - or much lower if at risk of heart attack and stroke   HDL cholesterol  Above 60 Above 1.55   Triglycerides  Below 150 Below 1.7   Cholesterol is measured differently in the [place] than it is in most other countries. This table shows values used within and outside the [place]. It includes the cholesterol and triglyceride levels that most people who do not have heart disease should aim for.  Graphic 28612 Version 3.0  Consumer  Information Use and Disclaimer   This information is not specific medical advice and does not replace information you receive from your health care provider. This is only a brief summary of general information. It does NOT include all information about conditions, illnesses, injuries, tests, procedures, treatments, therapies, discharge instructions or life-style choices that may apply to you. You must talk with your health care provider for complete information about your health and treatment options. This information should not be used to decide whether or not to accept your health care provider's advice, instructions or recommendations. Only your health care provider has the knowledge and training to provide advice that is right for you. The use of this information is governed by the [employer] End User License Agreement, available at https://www.[place]/en/solutions/[employer]/about/lisa.The use of [employer] content is governed by the [employer] Terms of Use. ©2021 UpToDate, Inc. All rights reserved.  Copyright   © 2021 UpToDate, Inc. and/or its affiliates. All rights reserved.    Patient Education       Diet and Health   The Basics   Written by the doctors and editors at [employer]   Why is it important to eat a healthy diet? -- It's important to eat a healthy diet because eating the right foods can keep you healthy now and later on in life.  Which foods are especially healthy? -- Foods that are especially healthy include:  Fruits and vegetables - Eating a diet with lots of fruits and vegetables can help prevent heart disease and strokes. It might also help prevent certain types of cancers. Try to eat fruits and vegetables at each meal and also for snacks. If you don't have fresh fruits and vegetables available, you can eat frozen or canned ones instead. Doctors recommend eating at least 2 1/2 servings of vegetables and 2 servings of fruits each day.  Foods with fiber - Eating foods with a lot of fiber can help  "prevent heart disease and strokes. If you have type 2 diabetes, it can also help control your blood sugar. Foods that have a lot of fiber include vegetables, fruits, beans, nuts, oatmeal, and whole grain breads and cereals. You can tell how much fiber is in a food by reading the nutrition label (figure 1). Doctors recommend eating 25 to 36 grams of fiber each day.  Foods with folate (also called folic acid) - Folate is a vitamin that is important for pregnant people, since it helps prevent certain birth defects. Anyone who could get pregnant should get at least 400 micrograms of folic acid daily, whether or not they are actively trying to get pregnant. Folate is found in many breakfast cereals, oranges, orange juice, and green leafy vegetables.  Foods with calcium and vitamin D - Babies, children, and adults need calcium and vitamin D to help keep their bones strong. Adults also need calcium and vitamin D to help prevent osteoporosis. Osteoporosis is a condition that causes bones to get "thin" and break more easily than usual. Different foods and drinks have calcium and vitamin D in them (figure 2). People who don't get enough calcium and vitamin D in their diet might need to take a supplement.  Foods with protein - Protein helps your muscles stay strong. Healthy foods with a lot of protein include chicken, fish, eggs, beans, nuts, and soy products. Red meat also has a lot of protein, but it also contains fats, which can be unhealthy.  Some experts recommend a "Mediterranean diet." This involves eating a lot of fruits, vegetables, nuts, whole grains, and olive oil. It also includes some fish, poultry, and dairy products, but not a lot of red meat. Eating this way can help your overall health, and might even lower your risk of having a stroke.  What foods should I avoid or limit? -- To eat a healthy diet, there are some things you should avoid or limit. They include:   Fats - There are different types of fats. Some " "types of fats are better for your body than others.  Trans fats are especially unhealthy. They are found in margarines, many fast foods, and some store-bought baked goods. Trans fats can raise your cholesterol level and your increase your chance of getting heart disease. You should avoid eating foods with these types of fats.  The type of "polyunsaturated" fats found in fish seems to be healthy and can reduce your chance of getting heart disease. Other polyunsaturated fats might also be good for your health. When you cook, it's best to use oils with healthier fats, such as olive oil and canola oil.  Sugar - To have a healthy diet, it's important to limit or avoid sugar, sweets, and refined grains. Refined grains are found in white bread, white rice, most forms of pasta, and most packaged "snack" foods. Whole grains, such as whole-wheat bread and brown rice, have more fiber and are better for your health.  Avoiding sugar-sweetened beverages, like soda and sports drinks, can also help improve your health.  Red meat - Studies have shown that eating a lot of red meat can increase your risk of certain health problems, including heart disease and cancer. You should limit the amount of red meat that you eat.  Can I drink alcohol as part of a healthy diet? -- People who drink a small amount of alcohol each day might have a lower chance of getting heart disease. But drinking alcohol can lead to problems. For example, it can raise a person's chances of getting liver disease and certain types of cancers. In women, even 1 drink a day can increase the risk of getting breast cancer.  Most doctors recommend that adult women not have more than 1 drink a day and that adult men not have more than 2 drinks a day. The limits are different because most women's bodies take longer to break down alcohol.  How many calories do I need each day? -- The number of calories you need each day depends on your weight, height, age, sex, and how " "active you are.  Your doctor or nurse can tell you how many calories you should eat each day. If you are trying to lose weight, you should eat fewer calories each day.  What if I have questions? -- If you have questions about which foods you should or should not eat, ask your doctor or nurse. The right diet for you will depend, in part, on your health and any medical conditions you have.  All topics are updated as new evidence becomes available and our peer review process is complete.  This topic retrieved from [employer] on: Sep 21, 2021.  Topic 52190 Version 20.0  Release: 29.4.2 - C29.263  © 2021 UpToDate, Inc. and/or its affiliates. All rights reserved.  figure 1: Nutrition label - fiber     This is an example of a nutrition label. To figure out how much fiber is in a food, look for the line that says "Dietary Fiber." It's also important to look at the serving size. This food has 7 grams of fiber in each serving, and each serving is 1 cup.  Graphic 02693 Version 7.0    figure 2: Foods and drinks with calcium and vitamin D     Foods rich in calcium include ice cream, soy milk, breads, kale, broccoli, milk, cheese, cottage cheese, almonds, yogurt, ready-to-eat cereals, beans, and tofu. Foods rich in vitamin D include milk, fortified plant-based "milks" (soy, almond), canned tuna fish, cod liver oil, yogurt, ready-to-eat-cereals, cooked salmon, canned sardines, mackerel, and eggs. Some of these foods are rich in both.  Graphic 33824 Version 4.0    Consumer Information Use and Disclaimer   This information is not specific medical advice and does not replace information you receive from your health care provider. This is only a brief summary of general information. It does NOT include all information about conditions, illnesses, injuries, tests, procedures, treatments, therapies, discharge instructions or life-style choices that may apply to you. You must talk with your health care provider for complete information " about your health and treatment options. This information should not be used to decide whether or not to accept your health care provider's advice, instructions or recommendations. Only your health care provider has the knowledge and training to provide advice that is right for you. The use of this information is governed by the [facility] End User License Agreement, available at [email or web address]/en/solutions/[employer]/about/lisa.The use of [employer] content is governed by the [employer] Terms of Use. ©2021 [employer]. All rights reserved.  Copyright   © 2021 UpToDate, Inc. and/or its affiliates. All rights reserved.

## 2024-06-27 DIAGNOSIS — E55.9 VITAMIN D DEFICIENCY: Primary | ICD-10-CM

## 2024-06-27 RX ORDER — ERGOCALCIFEROL 1.25 MG/1
50000 CAPSULE ORAL
Qty: 12 CAPSULE | Refills: 0 | Status: SHIPPED | OUTPATIENT
Start: 2024-06-27

## 2024-07-17 ENCOUNTER — HOSPITAL ENCOUNTER (OUTPATIENT)
Dept: RADIOLOGY | Facility: HOSPITAL | Age: 49
Discharge: HOME | End: 2024-07-17
Payer: COMMERCIAL

## 2024-07-17 VITALS — HEIGHT: 64 IN | BODY MASS INDEX: 24.59 KG/M2 | WEIGHT: 144 LBS

## 2024-07-17 DIAGNOSIS — Z12.31 OTHER SCREENING MAMMOGRAM: ICD-10-CM

## 2024-07-17 PROCEDURE — 77063 BREAST TOMOSYNTHESIS BI: CPT | Mod: TC

## 2024-09-30 PROBLEM — Z00.00 ROUTINE GENERAL MEDICAL EXAMINATION AT A HEALTH CARE FACILITY: Status: RESOLVED | Noted: 2024-06-26 | Resolved: 2024-09-30

## 2024-10-08 ENCOUNTER — OFFICE VISIT (OUTPATIENT)
Dept: FAMILY MEDICINE | Facility: CLINIC | Age: 49
End: 2024-10-08
Payer: COMMERCIAL

## 2024-10-08 VITALS
TEMPERATURE: 98 F | HEART RATE: 67 BPM | RESPIRATION RATE: 18 BRPM | DIASTOLIC BLOOD PRESSURE: 76 MMHG | WEIGHT: 147 LBS | SYSTOLIC BLOOD PRESSURE: 110 MMHG | OXYGEN SATURATION: 97 % | HEIGHT: 64 IN | BODY MASS INDEX: 25.1 KG/M2

## 2024-10-08 DIAGNOSIS — R10.13 EPIGASTRIC PAIN: Primary | ICD-10-CM

## 2024-10-08 DIAGNOSIS — M54.6 ACUTE BILATERAL THORACIC BACK PAIN: ICD-10-CM

## 2024-10-08 LAB
ALBUMIN SERPL BCP-MCNC: 3.6 G/DL (ref 3.5–5)
ALBUMIN/GLOB SERPL: 0.9 {RATIO}
ALP SERPL-CCNC: 90 U/L (ref 39–100)
ALT SERPL W P-5'-P-CCNC: 51 U/L (ref 13–56)
AMYLASE SERPL-CCNC: 56 U/L (ref 25–115)
ANION GAP SERPL CALCULATED.3IONS-SCNC: 10 MMOL/L (ref 7–16)
AST SERPL W P-5'-P-CCNC: 34 U/L (ref 15–37)
BASOPHILS # BLD AUTO: 0.03 K/UL (ref 0–0.2)
BASOPHILS NFR BLD AUTO: 0.7 % (ref 0–1)
BILIRUB SERPL-MCNC: 0.4 MG/DL (ref ?–1.2)
BILIRUB SERPL-MCNC: NEGATIVE MG/DL
BLOOD URINE, POC: NEGATIVE
BUN SERPL-MCNC: 18 MG/DL (ref 7–18)
BUN/CREAT SERPL: 17 (ref 6–20)
CALCIUM SERPL-MCNC: 9.1 MG/DL (ref 8.5–10.1)
CHLORIDE SERPL-SCNC: 111 MMOL/L (ref 98–107)
CLARITY, UA: CLEAR
CO2 SERPL-SCNC: 23 MMOL/L (ref 21–32)
COLOR, UA: YELLOW
CREAT SERPL-MCNC: 1.03 MG/DL (ref 0.55–1.02)
DIFFERENTIAL METHOD BLD: ABNORMAL
EGFR (NO RACE VARIABLE) (RUSH/TITUS): 67 ML/MIN/1.73M2
EOSINOPHIL # BLD AUTO: 0.05 K/UL (ref 0–0.5)
EOSINOPHIL NFR BLD AUTO: 1.1 % (ref 1–4)
ERYTHROCYTE [DISTWIDTH] IN BLOOD BY AUTOMATED COUNT: 13.3 % (ref 11.5–14.5)
GLOBULIN SER-MCNC: 3.9 G/DL (ref 2–4)
GLUCOSE SERPL-MCNC: 90 MG/DL (ref 74–106)
GLUCOSE UR QL STRIP: NEGATIVE
HCT VFR BLD AUTO: 40.6 % (ref 38–47)
HGB BLD-MCNC: 13 G/DL (ref 12–16)
IMM GRANULOCYTES # BLD AUTO: 0.01 K/UL (ref 0–0.04)
IMM GRANULOCYTES NFR BLD: 0.2 % (ref 0–0.4)
KETONES UR QL STRIP: NORMAL
LEUKOCYTE ESTERASE URINE, POC: NEGATIVE
LIPASE SERPL-CCNC: 48 U/L (ref 16–77)
LYMPHOCYTES # BLD AUTO: 1.3 K/UL (ref 1–4.8)
LYMPHOCYTES NFR BLD AUTO: 28.4 % (ref 27–41)
MCH RBC QN AUTO: 31.5 PG (ref 27–31)
MCHC RBC AUTO-ENTMCNC: 32 G/DL (ref 32–36)
MCV RBC AUTO: 98.3 FL (ref 80–96)
MONOCYTES # BLD AUTO: 0.32 K/UL (ref 0–0.8)
MONOCYTES NFR BLD AUTO: 7 % (ref 2–6)
MPC BLD CALC-MCNC: 9.8 FL (ref 9.4–12.4)
NEUTROPHILS # BLD AUTO: 2.86 K/UL (ref 1.8–7.7)
NEUTROPHILS NFR BLD AUTO: 62.6 % (ref 53–65)
NITRITE, POC UA: NEGATIVE
NRBC # BLD AUTO: 0 X10E3/UL
NRBC, AUTO (.00): 0 %
PH, POC UA: 6
PLATELET # BLD AUTO: 297 K/UL (ref 150–400)
POTASSIUM SERPL-SCNC: 3.9 MMOL/L (ref 3.5–5.1)
PROT SERPL-MCNC: 7.5 G/DL (ref 6.4–8.2)
PROTEIN, POC: NEGATIVE
RBC # BLD AUTO: 4.13 M/UL (ref 4.2–5.4)
SODIUM SERPL-SCNC: 140 MMOL/L (ref 136–145)
SPECIFIC GRAVITY, POC UA: 1.02
UROBILINOGEN, POC UA: 0.2
WBC # BLD AUTO: 4.57 K/UL (ref 4.5–11)

## 2024-10-08 PROCEDURE — 3008F BODY MASS INDEX DOCD: CPT | Mod: ,,,

## 2024-10-08 PROCEDURE — 83690 ASSAY OF LIPASE: CPT | Mod: ,,, | Performed by: CLINICAL MEDICAL LABORATORY

## 2024-10-08 PROCEDURE — 3078F DIAST BP <80 MM HG: CPT | Mod: ,,,

## 2024-10-08 PROCEDURE — 80053 COMPREHEN METABOLIC PANEL: CPT | Mod: ,,, | Performed by: CLINICAL MEDICAL LABORATORY

## 2024-10-08 PROCEDURE — 85025 COMPLETE CBC W/AUTO DIFF WBC: CPT | Mod: ,,, | Performed by: CLINICAL MEDICAL LABORATORY

## 2024-10-08 PROCEDURE — 82150 ASSAY OF AMYLASE: CPT | Mod: ,,, | Performed by: CLINICAL MEDICAL LABORATORY

## 2024-10-08 PROCEDURE — 81003 URINALYSIS AUTO W/O SCOPE: CPT | Mod: QW,,,

## 2024-10-08 PROCEDURE — 3074F SYST BP LT 130 MM HG: CPT | Mod: ,,,

## 2024-10-08 PROCEDURE — 99214 OFFICE O/P EST MOD 30 MIN: CPT | Mod: ,,,

## 2024-10-08 RX ORDER — KETOROLAC TROMETHAMINE 10 MG/1
10 TABLET, FILM COATED ORAL EVERY 6 HOURS
Qty: 20 TABLET | Refills: 0 | Status: SHIPPED | OUTPATIENT
Start: 2024-10-08 | End: 2024-10-13

## 2024-10-11 ENCOUNTER — HOSPITAL ENCOUNTER (OUTPATIENT)
Facility: HOSPITAL | Age: 49
Discharge: HOME | End: 2024-10-12
Attending: EMERGENCY MEDICINE | Admitting: SURGERY
Payer: COMMERCIAL

## 2024-10-11 DIAGNOSIS — K81.9 CHOLECYSTITIS: Primary | ICD-10-CM

## 2024-10-11 DIAGNOSIS — R10.9 ABDOMINAL PAIN: ICD-10-CM

## 2024-10-11 DIAGNOSIS — R07.9 CHEST PAIN: ICD-10-CM

## 2024-10-11 LAB
BILIRUB UR QL STRIP: NEGATIVE
CLARITY UR: ABNORMAL
COLOR UR: YELLOW
GLUCOSE UR STRIP-MCNC: NORMAL MG/DL
KETONES UR STRIP-SCNC: NEGATIVE MG/DL
LEUKOCYTE ESTERASE UR QL STRIP: NEGATIVE
NITRITE UR QL STRIP: NEGATIVE
PH UR STRIP: 6.5 PH UNITS
PROT UR QL STRIP: NEGATIVE
RBC # UR STRIP: NEGATIVE /UL
SP GR UR STRIP: 1.02
UROBILINOGEN UR STRIP-ACNC: 2 MG/DL

## 2024-10-11 PROCEDURE — 93005 ELECTROCARDIOGRAM TRACING: CPT

## 2024-10-11 PROCEDURE — 36415 COLL VENOUS BLD VENIPUNCTURE: CPT | Performed by: NURSE PRACTITIONER

## 2024-10-11 PROCEDURE — 85025 COMPLETE CBC W/AUTO DIFF WBC: CPT | Performed by: NURSE PRACTITIONER

## 2024-10-11 PROCEDURE — 84484 ASSAY OF TROPONIN QUANT: CPT | Performed by: NURSE PRACTITIONER

## 2024-10-11 PROCEDURE — 81003 URINALYSIS AUTO W/O SCOPE: CPT | Performed by: NURSE PRACTITIONER

## 2024-10-11 PROCEDURE — 99285 EMERGENCY DEPT VISIT HI MDM: CPT | Mod: 25

## 2024-10-11 PROCEDURE — 93010 ELECTROCARDIOGRAM REPORT: CPT | Mod: ,,, | Performed by: INTERNAL MEDICINE

## 2024-10-11 PROCEDURE — 83690 ASSAY OF LIPASE: CPT | Performed by: EMERGENCY MEDICINE

## 2024-10-11 PROCEDURE — 80053 COMPREHEN METABOLIC PANEL: CPT | Performed by: NURSE PRACTITIONER

## 2024-10-12 ENCOUNTER — ANESTHESIA EVENT (OUTPATIENT)
Dept: SURGERY | Facility: HOSPITAL | Age: 49
End: 2024-10-12
Payer: COMMERCIAL

## 2024-10-12 ENCOUNTER — ANESTHESIA (OUTPATIENT)
Dept: SURGERY | Facility: HOSPITAL | Age: 49
End: 2024-10-12
Payer: COMMERCIAL

## 2024-10-12 VITALS
HEART RATE: 76 BPM | SYSTOLIC BLOOD PRESSURE: 113 MMHG | HEIGHT: 64 IN | DIASTOLIC BLOOD PRESSURE: 72 MMHG | OXYGEN SATURATION: 98 % | WEIGHT: 140.44 LBS | RESPIRATION RATE: 12 BRPM | BODY MASS INDEX: 23.98 KG/M2 | TEMPERATURE: 98 F

## 2024-10-12 PROBLEM — F17.200 TOBACCO DEPENDENCY: Status: ACTIVE | Noted: 2024-10-12

## 2024-10-12 PROBLEM — K81.9 CHOLECYSTITIS: Status: ACTIVE | Noted: 2024-10-12

## 2024-10-12 LAB
ALBUMIN SERPL BCP-MCNC: 3.4 G/DL (ref 3.5–5)
ALBUMIN/GLOB SERPL: 0.9 {RATIO}
ALP SERPL-CCNC: 143 U/L (ref 39–100)
ALT SERPL W P-5'-P-CCNC: 142 U/L (ref 13–56)
ANION GAP SERPL CALCULATED.3IONS-SCNC: 10 MMOL/L (ref 7–16)
AST SERPL W P-5'-P-CCNC: 233 U/L (ref 15–37)
BASOPHILS # BLD AUTO: 0.04 K/UL (ref 0–0.2)
BASOPHILS NFR BLD AUTO: 0.6 % (ref 0–1)
BILIRUB SERPL-MCNC: 0.3 MG/DL (ref ?–1.2)
BUN SERPL-MCNC: 16 MG/DL (ref 7–18)
BUN/CREAT SERPL: 16 (ref 6–20)
CALCIUM SERPL-MCNC: 8.4 MG/DL (ref 8.5–10.1)
CHLORIDE SERPL-SCNC: 115 MMOL/L (ref 98–107)
CO2 SERPL-SCNC: 23 MMOL/L (ref 21–32)
CREAT SERPL-MCNC: 0.98 MG/DL (ref 0.55–1.02)
DIFFERENTIAL METHOD BLD: ABNORMAL
EGFR (NO RACE VARIABLE) (RUSH/TITUS): 71 ML/MIN/1.73M2
EOSINOPHIL # BLD AUTO: 0.06 K/UL (ref 0–0.5)
EOSINOPHIL NFR BLD AUTO: 0.9 % (ref 1–4)
ERYTHROCYTE [DISTWIDTH] IN BLOOD BY AUTOMATED COUNT: 13 % (ref 11.5–14.5)
GLOBULIN SER-MCNC: 3.7 G/DL (ref 2–4)
GLUCOSE SERPL-MCNC: 112 MG/DL (ref 74–106)
HCT VFR BLD AUTO: 37 % (ref 38–47)
HGB BLD-MCNC: 12 G/DL (ref 12–16)
IMM GRANULOCYTES # BLD AUTO: 0.1 K/UL (ref 0–0.04)
IMM GRANULOCYTES NFR BLD: 1.5 % (ref 0–0.4)
LIPASE SERPL-CCNC: 94 U/L (ref 16–77)
LYMPHOCYTES # BLD AUTO: 1.55 K/UL (ref 1–4.8)
LYMPHOCYTES NFR BLD AUTO: 22.5 % (ref 27–41)
MCH RBC QN AUTO: 31.2 PG (ref 27–31)
MCHC RBC AUTO-ENTMCNC: 32.4 G/DL (ref 32–36)
MCV RBC AUTO: 96.1 FL (ref 80–96)
MONOCYTES # BLD AUTO: 0.52 K/UL (ref 0–0.8)
MONOCYTES NFR BLD AUTO: 7.5 % (ref 2–6)
MPC BLD CALC-MCNC: 9.5 FL (ref 9.4–12.4)
NEUTROPHILS # BLD AUTO: 4.62 K/UL (ref 1.8–7.7)
NEUTROPHILS NFR BLD AUTO: 67 % (ref 53–65)
NRBC # BLD AUTO: 0 X10E3/UL
NRBC, AUTO (.00): 0 %
PLATELET # BLD AUTO: 275 K/UL (ref 150–400)
POTASSIUM SERPL-SCNC: 3.8 MMOL/L (ref 3.5–5.1)
PROT SERPL-MCNC: 7.1 G/DL (ref 6.4–8.2)
RBC # BLD AUTO: 3.85 M/UL (ref 4.2–5.4)
SODIUM SERPL-SCNC: 144 MMOL/L (ref 136–145)
TROPONIN I SERPL DL<=0.01 NG/ML-MCNC: 4 PG/ML
WBC # BLD AUTO: 6.89 K/UL (ref 4.5–11)

## 2024-10-12 PROCEDURE — 63600175 PHARM REV CODE 636 W HCPCS: Performed by: EMERGENCY MEDICINE

## 2024-10-12 PROCEDURE — 11000001 HC ACUTE MED/SURG PRIVATE ROOM

## 2024-10-12 PROCEDURE — 27000689 HC BLADE LARYNGOSCOPE ANY SIZE: Performed by: ANESTHESIOLOGY

## 2024-10-12 PROCEDURE — 25000003 PHARM REV CODE 250

## 2024-10-12 PROCEDURE — 96361 HYDRATE IV INFUSION ADD-ON: CPT

## 2024-10-12 PROCEDURE — G0378 HOSPITAL OBSERVATION PER HR: HCPCS

## 2024-10-12 PROCEDURE — 96376 TX/PRO/DX INJ SAME DRUG ADON: CPT | Mod: 59

## 2024-10-12 PROCEDURE — 63600175 PHARM REV CODE 636 W HCPCS: Mod: JZ,JG

## 2024-10-12 PROCEDURE — 37000009 HC ANESTHESIA EA ADD 15 MINS: Performed by: SURGERY

## 2024-10-12 PROCEDURE — 27000510 HC BLANKET BAIR HUGGER ANY SIZE: Performed by: ANESTHESIOLOGY

## 2024-10-12 PROCEDURE — 25000003 PHARM REV CODE 250: Performed by: SURGERY

## 2024-10-12 PROCEDURE — 36000709 HC OR TIME LEV III EA ADD 15 MIN: Performed by: SURGERY

## 2024-10-12 PROCEDURE — 74300 X-RAY BILE DUCTS/PANCREAS: CPT | Mod: 26,,, | Performed by: SURGERY

## 2024-10-12 PROCEDURE — 27201423 OPTIME MED/SURG SUP & DEVICES STERILE SUPPLY: Performed by: SURGERY

## 2024-10-12 PROCEDURE — 36000708 HC OR TIME LEV III 1ST 15 MIN: Performed by: SURGERY

## 2024-10-12 PROCEDURE — D9220A PRA ANESTHESIA: Mod: ANES,,, | Performed by: ANESTHESIOLOGY

## 2024-10-12 PROCEDURE — 25000003 PHARM REV CODE 250: Performed by: EMERGENCY MEDICINE

## 2024-10-12 PROCEDURE — 27000716 HC OXISENSOR PROBE, ANY SIZE: Performed by: ANESTHESIOLOGY

## 2024-10-12 PROCEDURE — 37000008 HC ANESTHESIA 1ST 15 MINUTES: Performed by: SURGERY

## 2024-10-12 PROCEDURE — 47564 LAPARO CHOLECYSTECTOMY/EXPLR: CPT | Mod: ,,, | Performed by: SURGERY

## 2024-10-12 PROCEDURE — 71000033 HC RECOVERY, INTIAL HOUR: Performed by: SURGERY

## 2024-10-12 PROCEDURE — 88304 TISSUE EXAM BY PATHOLOGIST: CPT | Mod: TC,SUR | Performed by: SURGERY

## 2024-10-12 PROCEDURE — 27000165 HC TUBE, ETT CUFFED: Performed by: ANESTHESIOLOGY

## 2024-10-12 PROCEDURE — 96365 THER/PROPH/DIAG IV INF INIT: CPT

## 2024-10-12 PROCEDURE — 27000655: Performed by: ANESTHESIOLOGY

## 2024-10-12 PROCEDURE — 96366 THER/PROPH/DIAG IV INF ADDON: CPT

## 2024-10-12 PROCEDURE — D9220A PRA ANESTHESIA: Mod: CRNA,,,

## 2024-10-12 PROCEDURE — C1769 GUIDE WIRE: HCPCS | Performed by: SURGERY

## 2024-10-12 PROCEDURE — 25500020 PHARM REV CODE 255

## 2024-10-12 PROCEDURE — 88304 TISSUE EXAM BY PATHOLOGIST: CPT | Mod: 26,,, | Performed by: PATHOLOGY

## 2024-10-12 PROCEDURE — 94761 N-INVAS EAR/PLS OXIMETRY MLT: CPT

## 2024-10-12 RX ORDER — ROCURONIUM BROMIDE 10 MG/ML
INJECTION, SOLUTION INTRAVENOUS
Status: DISCONTINUED | OUTPATIENT
Start: 2024-10-12 | End: 2024-10-12

## 2024-10-12 RX ORDER — MORPHINE SULFATE 10 MG/ML
4 INJECTION INTRAMUSCULAR; INTRAVENOUS; SUBCUTANEOUS EVERY 5 MIN PRN
Status: DISCONTINUED | OUTPATIENT
Start: 2024-10-12 | End: 2024-10-12 | Stop reason: HOSPADM

## 2024-10-12 RX ORDER — LIDOCAINE HYDROCHLORIDE 20 MG/ML
INJECTION, SOLUTION EPIDURAL; INFILTRATION; INTRACAUDAL; PERINEURAL
Status: DISCONTINUED | OUTPATIENT
Start: 2024-10-12 | End: 2024-10-12

## 2024-10-12 RX ORDER — MORPHINE SULFATE 10 MG/ML
4 INJECTION INTRAMUSCULAR; INTRAVENOUS; SUBCUTANEOUS EVERY 4 HOURS PRN
Status: DISCONTINUED | OUTPATIENT
Start: 2024-10-12 | End: 2024-10-12 | Stop reason: HOSPADM

## 2024-10-12 RX ORDER — HYDROMORPHONE HYDROCHLORIDE 1 MG/ML
0.5 INJECTION, SOLUTION INTRAMUSCULAR; INTRAVENOUS; SUBCUTANEOUS EVERY 4 HOURS PRN
Status: DISCONTINUED | OUTPATIENT
Start: 2024-10-12 | End: 2024-10-12 | Stop reason: HOSPADM

## 2024-10-12 RX ORDER — SUMATRIPTAN SUCCINATE 100 MG/1
100 TABLET ORAL
Status: DISCONTINUED | OUTPATIENT
Start: 2024-10-12 | End: 2024-10-12 | Stop reason: HOSPADM

## 2024-10-12 RX ORDER — HYDROCODONE BITARTRATE AND ACETAMINOPHEN 7.5; 325 MG/1; MG/1
1 TABLET ORAL EVERY 6 HOURS PRN
Qty: 24 TABLET | Refills: 0 | Status: SHIPPED | OUTPATIENT
Start: 2024-10-12

## 2024-10-12 RX ORDER — DIPHENHYDRAMINE HYDROCHLORIDE 50 MG/ML
25 INJECTION INTRAMUSCULAR; INTRAVENOUS EVERY 6 HOURS PRN
Status: DISCONTINUED | OUTPATIENT
Start: 2024-10-12 | End: 2024-10-12 | Stop reason: HOSPADM

## 2024-10-12 RX ORDER — GLUCAGON 1 MG
1 KIT INJECTION
Status: DISCONTINUED | OUTPATIENT
Start: 2024-10-12 | End: 2024-10-12 | Stop reason: HOSPADM

## 2024-10-12 RX ORDER — LIDOCAINE HYDROCHLORIDE 10 MG/ML
1 INJECTION, SOLUTION EPIDURAL; INFILTRATION; INTRACAUDAL; PERINEURAL ONCE AS NEEDED
Status: DISCONTINUED | OUTPATIENT
Start: 2024-10-12 | End: 2024-10-12 | Stop reason: HOSPADM

## 2024-10-12 RX ORDER — KETOROLAC TROMETHAMINE 30 MG/ML
INJECTION, SOLUTION INTRAMUSCULAR; INTRAVENOUS
Status: DISCONTINUED | OUTPATIENT
Start: 2024-10-12 | End: 2024-10-12

## 2024-10-12 RX ORDER — GLYCOPYRROLATE 0.2 MG/ML
INJECTION INTRAMUSCULAR; INTRAVENOUS
Status: DISCONTINUED | OUTPATIENT
Start: 2024-10-12 | End: 2024-10-12

## 2024-10-12 RX ORDER — ONDANSETRON 4 MG/1
8 TABLET, ORALLY DISINTEGRATING ORAL EVERY 8 HOURS PRN
Status: DISCONTINUED | OUTPATIENT
Start: 2024-10-12 | End: 2024-10-12 | Stop reason: HOSPADM

## 2024-10-12 RX ORDER — ONDANSETRON 4 MG/1
4 TABLET, ORALLY DISINTEGRATING ORAL EVERY 8 HOURS PRN
Status: DISCONTINUED | OUTPATIENT
Start: 2024-10-12 | End: 2024-10-12 | Stop reason: HOSPADM

## 2024-10-12 RX ORDER — PROPOFOL 10 MG/ML
VIAL (ML) INTRAVENOUS
Status: DISCONTINUED | OUTPATIENT
Start: 2024-10-12 | End: 2024-10-12

## 2024-10-12 RX ORDER — MIDAZOLAM HYDROCHLORIDE 1 MG/ML
INJECTION INTRAMUSCULAR; INTRAVENOUS
Status: DISCONTINUED | OUTPATIENT
Start: 2024-10-12 | End: 2024-10-12

## 2024-10-12 RX ORDER — PANTOPRAZOLE SODIUM 40 MG/1
40 TABLET, DELAYED RELEASE ORAL DAILY
Status: DISCONTINUED | OUTPATIENT
Start: 2024-10-12 | End: 2024-10-12 | Stop reason: HOSPADM

## 2024-10-12 RX ORDER — SODIUM CHLORIDE 0.9 % (FLUSH) 0.9 %
10 SYRINGE (ML) INJECTION
Status: DISCONTINUED | OUTPATIENT
Start: 2024-10-12 | End: 2024-10-12 | Stop reason: HOSPADM

## 2024-10-12 RX ORDER — SODIUM CHLORIDE 9 MG/ML
INJECTION, SOLUTION INTRAVENOUS CONTINUOUS
Status: DISPENSED | OUTPATIENT
Start: 2024-10-12 | End: 2024-10-12

## 2024-10-12 RX ORDER — MEPERIDINE HYDROCHLORIDE 25 MG/ML
25 INJECTION INTRAMUSCULAR; INTRAVENOUS; SUBCUTANEOUS EVERY 10 MIN PRN
Status: DISCONTINUED | OUTPATIENT
Start: 2024-10-12 | End: 2024-10-12 | Stop reason: HOSPADM

## 2024-10-12 RX ORDER — ESMOLOL HYDROCHLORIDE 10 MG/ML
INJECTION INTRAVENOUS
Status: DISCONTINUED | OUTPATIENT
Start: 2024-10-12 | End: 2024-10-12

## 2024-10-12 RX ORDER — ONDANSETRON 2 MG/ML
INJECTION INTRAMUSCULAR; INTRAVENOUS
Status: DISCONTINUED | OUTPATIENT
Start: 2024-10-12 | End: 2024-10-12

## 2024-10-12 RX ORDER — TALC
6 POWDER (GRAM) TOPICAL NIGHTLY PRN
Status: DISCONTINUED | OUTPATIENT
Start: 2024-10-12 | End: 2024-10-12 | Stop reason: HOSPADM

## 2024-10-12 RX ORDER — TOPIRAMATE 100 MG/1
100 TABLET, FILM COATED ORAL 2 TIMES DAILY
Status: DISCONTINUED | OUTPATIENT
Start: 2024-10-12 | End: 2024-10-12 | Stop reason: HOSPADM

## 2024-10-12 RX ORDER — IBUPROFEN 200 MG
1 TABLET ORAL DAILY
Status: DISCONTINUED | OUTPATIENT
Start: 2024-10-12 | End: 2024-10-12 | Stop reason: HOSPADM

## 2024-10-12 RX ORDER — PHENYLEPHRINE HYDROCHLORIDE 10 MG/ML
INJECTION INTRAVENOUS
Status: DISCONTINUED | OUTPATIENT
Start: 2024-10-12 | End: 2024-10-12

## 2024-10-12 RX ORDER — ACETAMINOPHEN 325 MG/1
650 TABLET ORAL EVERY 8 HOURS PRN
Status: DISCONTINUED | OUTPATIENT
Start: 2024-10-12 | End: 2024-10-12 | Stop reason: HOSPADM

## 2024-10-12 RX ORDER — KETOROLAC TROMETHAMINE 10 MG/1
10 TABLET, FILM COATED ORAL EVERY 6 HOURS
Status: DISCONTINUED | OUTPATIENT
Start: 2024-10-12 | End: 2024-10-12 | Stop reason: HOSPADM

## 2024-10-12 RX ORDER — ONDANSETRON HYDROCHLORIDE 2 MG/ML
INJECTION, SOLUTION INTRAVENOUS
Status: DISCONTINUED | OUTPATIENT
Start: 2024-10-12 | End: 2024-10-12

## 2024-10-12 RX ORDER — FENTANYL CITRATE 50 UG/ML
INJECTION, SOLUTION INTRAMUSCULAR; INTRAVENOUS
Status: DISCONTINUED | OUTPATIENT
Start: 2024-10-12 | End: 2024-10-12

## 2024-10-12 RX ORDER — CYCLOBENZAPRINE HCL 5 MG
5 TABLET ORAL 2 TIMES DAILY PRN
Status: DISCONTINUED | OUTPATIENT
Start: 2024-10-12 | End: 2024-10-12 | Stop reason: HOSPADM

## 2024-10-12 RX ORDER — DEXAMETHASONE SODIUM PHOSPHATE 4 MG/ML
INJECTION, SOLUTION INTRA-ARTICULAR; INTRALESIONAL; INTRAMUSCULAR; INTRAVENOUS; SOFT TISSUE
Status: DISCONTINUED | OUTPATIENT
Start: 2024-10-12 | End: 2024-10-12

## 2024-10-12 RX ORDER — HYDROMORPHONE HYDROCHLORIDE 2 MG/ML
0.5 INJECTION, SOLUTION INTRAMUSCULAR; INTRAVENOUS; SUBCUTANEOUS EVERY 5 MIN PRN
Status: DISCONTINUED | OUTPATIENT
Start: 2024-10-12 | End: 2024-10-12 | Stop reason: HOSPADM

## 2024-10-12 RX ORDER — HYDROMORPHONE HYDROCHLORIDE 2 MG/ML
INJECTION, SOLUTION INTRAMUSCULAR; INTRAVENOUS; SUBCUTANEOUS
Status: DISCONTINUED | OUTPATIENT
Start: 2024-10-12 | End: 2024-10-12

## 2024-10-12 RX ORDER — DIPHENHYDRAMINE HYDROCHLORIDE 50 MG/ML
INJECTION INTRAMUSCULAR; INTRAVENOUS
Status: DISCONTINUED | OUTPATIENT
Start: 2024-10-12 | End: 2024-10-12

## 2024-10-12 RX ORDER — ACETAMINOPHEN 325 MG/1
650 TABLET ORAL EVERY 4 HOURS PRN
Status: DISCONTINUED | OUTPATIENT
Start: 2024-10-12 | End: 2024-10-12 | Stop reason: HOSPADM

## 2024-10-12 RX ORDER — HYDROMORPHONE HYDROCHLORIDE 1 MG/ML
1 INJECTION, SOLUTION INTRAMUSCULAR; INTRAVENOUS; SUBCUTANEOUS EVERY 4 HOURS PRN
Status: DISCONTINUED | OUTPATIENT
Start: 2024-10-12 | End: 2024-10-12 | Stop reason: HOSPADM

## 2024-10-12 RX ORDER — HYDROCODONE BITARTRATE AND ACETAMINOPHEN 10; 325 MG/1; MG/1
1 TABLET ORAL EVERY 4 HOURS PRN
Status: DISCONTINUED | OUTPATIENT
Start: 2024-10-12 | End: 2024-10-12 | Stop reason: HOSPADM

## 2024-10-12 RX ORDER — IBUPROFEN 600 MG/1
600 TABLET ORAL EVERY 6 HOURS PRN
Status: DISCONTINUED | OUTPATIENT
Start: 2024-10-12 | End: 2024-10-12 | Stop reason: HOSPADM

## 2024-10-12 RX ORDER — ONDANSETRON HYDROCHLORIDE 2 MG/ML
4 INJECTION, SOLUTION INTRAVENOUS DAILY PRN
Status: DISCONTINUED | OUTPATIENT
Start: 2024-10-12 | End: 2024-10-12 | Stop reason: HOSPADM

## 2024-10-12 RX ADMIN — GLYCOPYRROLATE 0.2 MG: 0.2 INJECTION INTRAMUSCULAR; INTRAVENOUS at 09:10

## 2024-10-12 RX ADMIN — PIPERACILLIN AND TAZOBACTAM 4.5 G: 4; .5 INJECTION, POWDER, LYOPHILIZED, FOR SOLUTION INTRAVENOUS; PARENTERAL at 05:10

## 2024-10-12 RX ADMIN — HYDROMORPHONE HYDROCHLORIDE 0.5 MG: 2 INJECTION, SOLUTION INTRAMUSCULAR; INTRAVENOUS; SUBCUTANEOUS at 09:10

## 2024-10-12 RX ADMIN — PHENYLEPHRINE HYDROCHLORIDE 100 MCG: 10 INJECTION INTRAVENOUS at 10:10

## 2024-10-12 RX ADMIN — MIDAZOLAM HYDROCHLORIDE 2 MG: 1 INJECTION, SOLUTION INTRAMUSCULAR; INTRAVENOUS at 08:10

## 2024-10-12 RX ADMIN — DEXAMETHASONE SODIUM PHOSPHATE 8 MG: 4 INJECTION, SOLUTION INTRA-ARTICULAR; INTRALESIONAL; INTRAMUSCULAR; INTRAVENOUS; SOFT TISSUE at 09:10

## 2024-10-12 RX ADMIN — PIPERACILLIN AND TAZOBACTAM 4.5 G: 4; .5 INJECTION, POWDER, LYOPHILIZED, FOR SOLUTION INTRAVENOUS; PARENTERAL at 01:10

## 2024-10-12 RX ADMIN — PROPOFOL 160 MG: 10 INJECTION, EMULSION INTRAVENOUS at 08:10

## 2024-10-12 RX ADMIN — SUGAMMADEX 200 MG: 100 INJECTION, SOLUTION INTRAVENOUS at 10:10

## 2024-10-12 RX ADMIN — HYDROMORPHONE HYDROCHLORIDE 0.5 MG: 2 INJECTION, SOLUTION INTRAMUSCULAR; INTRAVENOUS; SUBCUTANEOUS at 10:10

## 2024-10-12 RX ADMIN — ONDANSETRON 4 MG: 2 INJECTION INTRAMUSCULAR; INTRAVENOUS at 08:10

## 2024-10-12 RX ADMIN — LIDOCAINE HYDROCHLORIDE 80 MG: 20 INJECTION, SOLUTION INTRAVENOUS at 08:10

## 2024-10-12 RX ADMIN — CALCIUM CHLORIDE 0.5 G: 100 INJECTION, SOLUTION INTRAVENOUS at 09:10

## 2024-10-12 RX ADMIN — ESMOLOL HYDROCHLORIDE 10 MG: 100 INJECTION, SOLUTION INTRAVENOUS at 09:10

## 2024-10-12 RX ADMIN — PANTOPRAZOLE SODIUM 40 MG: 40 TABLET, DELAYED RELEASE ORAL at 12:10

## 2024-10-12 RX ADMIN — ROCURONIUM BROMIDE 50 MG: 10 INJECTION, SOLUTION INTRAVENOUS at 08:10

## 2024-10-12 RX ADMIN — PHENYLEPHRINE HYDROCHLORIDE 100 MCG: 10 INJECTION INTRAVENOUS at 09:10

## 2024-10-12 RX ADMIN — ONDANSETRON 4 MG: 2 INJECTION INTRAMUSCULAR; INTRAVENOUS at 10:10

## 2024-10-12 RX ADMIN — SODIUM CHLORIDE, POTASSIUM CHLORIDE, SODIUM LACTATE AND CALCIUM CHLORIDE: 600; 310; 30; 20 INJECTION, SOLUTION INTRAVENOUS at 10:10

## 2024-10-12 RX ADMIN — KETOROLAC TROMETHAMINE 30 MG: 30 INJECTION, SOLUTION INTRAMUSCULAR at 10:10

## 2024-10-12 RX ADMIN — FENTANYL CITRATE 50 MCG: 50 INJECTION, SOLUTION INTRAMUSCULAR; INTRAVENOUS at 08:10

## 2024-10-12 RX ADMIN — PIPERACILLIN AND TAZOBACTAM 4.5 G: 4; .5 INJECTION, POWDER, FOR SOLUTION INTRAVENOUS; PARENTERAL at 03:10

## 2024-10-12 RX ADMIN — DIPHENHYDRAMINE HYDROCHLORIDE 12.5 MG: 50 INJECTION INTRAMUSCULAR; INTRAVENOUS at 09:10

## 2024-10-12 RX ADMIN — KETOROLAC TROMETHAMINE 10 MG: 10 TABLET, FILM COATED ORAL at 01:10

## 2024-10-12 RX ADMIN — FENTANYL CITRATE 50 MCG: 50 INJECTION, SOLUTION INTRAMUSCULAR; INTRAVENOUS at 09:10

## 2024-10-12 RX ADMIN — TOPIRAMATE 100 MG: 100 TABLET, FILM COATED ORAL at 12:10

## 2024-10-12 RX ADMIN — SODIUM CHLORIDE: 9 INJECTION, SOLUTION INTRAVENOUS at 12:10

## 2024-10-12 RX ADMIN — SODIUM CHLORIDE: 9 INJECTION, SOLUTION INTRAVENOUS at 03:10

## 2024-10-12 NOTE — TRANSFER OF CARE
"Anesthesia Transfer of Care Note    Patient: Rubia Contreras    Procedure(s) Performed: Procedure(s) (LRB):  CHOLECYSTECTOMY, LAPAROSCOPIC, WITH COMMON BILE DUCT EXPLORATION  CHOLANGIOGRAM (N/A)    Patient location: PACU    Anesthesia Type: general    Transport from OR: Transported from OR on 6-10 L/min O2 by face mask with adequate spontaneous ventilation    Post pain: adequate analgesia    Post assessment: no apparent anesthetic complications    Post vital signs: stable    Level of consciousness: responds to stimulation    Nausea/Vomiting: no nausea/vomiting    Complications: none    Transfer of care protocol was followed      Last vitals: Visit Vitals  /83 (BP Location: Left arm, Patient Position: Lying)   Pulse 98   Temp 36.6 °C (97.8 °F) (Oral)   Resp 14   Ht 5' 4" (1.626 m)   Wt 63.7 kg (140 lb 6.9 oz)   LMP  (LMP Unknown)   SpO2 99%   Breastfeeding No   BMI 24.11 kg/m²     "

## 2024-10-12 NOTE — PLAN OF CARE
1040 RECEIVED FROM OR RESTING QUIETLY, SOME OBSTRUCTING NOTED, RESOLVED AFTER VERBAL STIMULI. DRESSINGS X5 TO ABDOMEN C/D/I. WILL CONTINUE TO MONITOR.    1055  NOTIFIED OF PATIENT STATUS    1120 TRANSFERRED TO ROOM 556. NO DISTRESS NOTED.  AT BEDSIDE. REPORT GIVEN TO GERALDINE WARE 98.4 100% 2L NC 83 119/79

## 2024-10-12 NOTE — OP NOTE
[facility] - Periop Services  General Surgery  Operative Note        Date of Procedure: 10/12/2024     Procedure: Procedure(s) (LRB):  CHOLECYSTECTOMY, LAPAROSCOPIC, WITH COMMON BILE DUCT EXPLORATION  CHOLANGIOGRAM (N/A)       Surgeons and Role:     * Stevan Mazariegos MD - Primary    Assisting Surgeon: None    Pre-Operative Diagnosis: Cholecystitis [K81.9]    Post-Operative Diagnosis: Post-Op Diagnosis Codes:     * Cholecystitis [K81.9]    Anesthesia: General    Operative Findings (including complications, if any):  Duct and artery well seen.  Cholangiogram with possible filling defect, but there was flow of dye into duodenum.  Duct was unusually large.   Therefore, Spyglass CBDE was performed.  No stone identified.  Operation completed laparoscopically    Description of Technical Procedures: The patient was brought to the operating room placed in supine position. General anesthesia was administered. The abdomen was prepped and draped in sterile fashion. An infraumbilical incision was then performed and dissection was carried down to the fascia, which was sharply transected. A Cal cannula was inserted. CO2 pneumoperitoneum was established. 5 mm trochars were then placed under direct visualization in the right upper quadrant, epigastrium and mid epigastrium. The fundus was retracted upward, and infundibulum was retracted laterally. Dissection then proceeded in the area of the infundibulum, dissecting the artery and the duct. Artery was clipped twice proximally, once distally, and then transected. At this point, the duct was clipped once distally and partially transected. The cholangiogram catheter was then inserted percutaneously and clipped in place. The cholangiogram was performed with findings as per above.  Due to a possible visualized stone in the distal duct in poor tapering, decision was made to perform spyglass common duct exploration.  An additional incision was made adjacent to the  cholangiogram catheter and a trocar was placed.  Subsequent to this the dilator and introducer were passed through this tract.  Introducer was then placed inside the duct over a guidewire.  Dilator was removed leaving the introducer sheath and guidewire in place.  Scope was then introduced and advanced into the common duct.  Scope was then able to be advanced all the way through the ampulla into the duodenum.  There was no stone identified.  Scope was then slowly retracted, again without visualization of any stones.  There was mild amount of biliary sludge identifiable, but none large enough for obstruction.  After removing the introducer sheath, the duct was clipped 3 times proximally and completely transected.  Cautery was used to carefully separate the gallbladder away from the liver bed. Hemostasis was achieved with cautery. The right upper quadrant was irrigated with saline solution. Spilled bile was suctioned free. The gallbladder was removed through the infraumbilical site. Fascia was closed using interrupted PDS. All ports were then closed at the skin level with subcuticular Vicryl.  The patient was then awakened from anesthesia and transported to recovery room in stable condition.     Estimated Blood Loss (EBL):10cc             Specimens: Gallbladder to pathology  Specimen (24h ago, onward)       Start     Ordered    10/12/24 0959  Surgical Pathology  RELEASE UPON ORDERING         10/12/24 0959                            Condition: Good    Disposition: PACU - hemodynamically stable.

## 2024-10-12 NOTE — PLAN OF CARE
Problem: Adult Inpatient Plan of Care  Goal: Plan of Care Review  Outcome: Progressing  Flowsheets (Taken 10/12/2024 0411)  Plan of Care Reviewed With: patient  Goal: Optimal Comfort and Wellbeing  Outcome: Progressing  Intervention: Monitor Pain and Promote Comfort  Flowsheets (Taken 10/12/2024 0411)  Pain Management Interventions:   care clustered   quiet environment facilitated   relaxation techniques promoted  Intervention: Provide Person-Centered Care  Flowsheets (Taken 10/12/2024 0411)  Trust Relationship/Rapport:   care explained   choices provided   questions encouraged   reassurance provided   emotional support provided   thoughts/feelings acknowledged   empathic listening provided   questions answered

## 2024-10-12 NOTE — ANESTHESIA POSTPROCEDURE EVALUATION
Anesthesia Post Evaluation    Patient: Rubia Contreras    Procedure(s) Performed: Procedure(s) (LRB):  CHOLECYSTECTOMY, LAPAROSCOPIC, WITH COMMON BILE DUCT EXPLORATION  CHOLANGIOGRAM (N/A)    Final Anesthesia Type: general      Patient location during evaluation: PACU  Post-procedure vital signs: reviewed and stable  Pain management: adequate  Airway patency: patent    PONV status at discharge: No PONV  Anesthetic complications: no      Cardiovascular status: hemodynamically stable  Respiratory status: unassisted  Hydration status: euvolemic  Follow-up not needed.              Vitals Value Taken Time   /79 10/12/24 1102   Temp 36.6 °C (97.8 °F) 10/12/24 1043   Pulse 93 10/12/24 1105   Resp 14 10/12/24 1043   SpO2 95 % 10/12/24 1105   Vitals shown include unfiled device data.      No case tracking events are documented in the log.      Pain/Anna Score: Presence of Pain: non-verbal indicators present (10/12/2024  1:51 AM)  Anna Score: 8 (10/12/2024 10:40 AM)

## 2024-10-12 NOTE — NURSING
Pt tolerating diet. Vss. 4/10 pain to abd incision with activity. Incisions c/d/I. MD notified r/t discharge progress.

## 2024-10-12 NOTE — ANESTHESIA PREPROCEDURE EVALUATION
10/12/2024  Rubia Contreras is a 49 y.o., female.      Pre-op Assessment    I have reviewed the Patient Summary Reports.    I have reviewed the NPO Status.   I have reviewed the Medications.     Review of Systems         Anesthesia Plan  Type of Anesthesia, risks & benefits discussed:    Anesthesia Type: Gen ETT  Intra-op Monitoring Plan: Standard ASA Monitors  Post Op Pain Control Plan: IV/PO Opioids PRN  Induction:  IV  Informed Consent: Informed consent signed with the Patient and all parties understand the risks and agree with anesthesia plan.  All questions answered.   ASA Score: 2    Ready For Surgery From Anesthesia Perspective.     .  NPO greater than 8 hours  No anesthetic complications  NKDA    Hct 37  Ca 8.4    Anxiety  Tobacco dependence  GERD  TMJ syndrome  Previous hysterectomy    Airway exam deferred (COVID precautions)

## 2024-10-12 NOTE — ED PROVIDER NOTES
Encounter Date: 10/11/2024       History     Chief Complaint   Patient presents with    Gastroesophageal Reflux     Pov to er - c/o abd / cp  p eating fried food tonight      49 year old female presents to ED with complaint of abdominal pain. Patient reports she ate some fried shrimp from a fast food restaurant and started experiencing sharp pain to abdomen that radiated into her chest and into her back. She states she became diaphoretic and nauseated. Patient reports symptoms lasted approximately 45 minutes and stopped for an hour then returned. She states symptoms resolved in route to facility. She reports similar episodes 3 weeks ago after eating chicken salad and tuna salad. Patient reports she went to PCP's office on 10/8 and had labs and was scheduled to have an US of abdomen to rule out gallbladder issues. PMH of RSD, arthritis, GERD, hyperlipidemia, migraines, and Raynaud's.     The history is provided by the patient and medical records.     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    OOPHORECTOMY      TONSILLECTOMY       Family History   Problem Relation Name Age of Onset    Hypothyroidism Mother      Cirrhosis Mother      Other (Liver Transplant) Mother      Hypertension Father Duane Stamper     Atrial fibrillation Father Duane Stamper     Pancreatitis Brother      No Known Problems Daughter      Cerebral palsy Daughter      No Known Problems Son       Social History     Tobacco Use    Smoking  status: Every Day     Current packs/day: 0.00     Average packs/day: 0.5 packs/day for 25.0 years (12.5 ttl pk-yrs)     Types: Cigarettes, Vaping with nicotine     Start date: 1993     Last attempt to quit: 2018     Years since quittin.7     Passive exposure: Past    Smokeless tobacco: Never   Substance Use Topics    Alcohol use: Yes     Alcohol/week: 7.0 standard drinks of alcohol     Types: 7 Drinks containing 0.5 oz of alcohol per week     Comment: 2 mixed drinks per week    Drug use: Not Currently     Types: Hydrocodone     Comment: past use     Review of Systems   Constitutional:  Positive for diaphoresis. Negative for chills.   HENT:  Negative for sinus pressure and sinus pain.    Eyes:  Negative for photophobia and visual disturbance.   Respiratory:  Positive for shortness of breath. Negative for cough.    Cardiovascular:  Positive for chest pain. Negative for palpitations.   Gastrointestinal:  Positive for abdominal pain and nausea. Negative for vomiting.   Genitourinary:  Negative for dysuria and urgency.   Musculoskeletal:  Positive for back pain. Negative for arthralgias and gait problem.   Skin:  Negative for color change and wound.   Neurological:  Negative for dizziness and weakness.   Hematological:  Negative for adenopathy. Does not bruise/bleed easily.   Psychiatric/Behavioral:  Negative for agitation and confusion.    All other systems reviewed and are negative.      Physical Exam     Initial Vitals [10/11/24 2237]   BP Pulse Resp Temp SpO2   116/77 82 18 98.3 °F (36.8 °C) 99 %      MAP       --         Physical Exam    Nursing note and vitals reviewed.  Constitutional: She appears well-developed and well-nourished.   HENT:   Head: Normocephalic and atraumatic.   Eyes: EOM are normal. Pupils are equal, round, and reactive to light.   Neck: Neck supple.   Normal range of motion.  Cardiovascular:  Normal rate and regular rhythm.           No murmur heard.  Pulmonary/Chest: She has no wheezes.  She has no rhonchi. She exhibits tenderness.   Abdominal: Abdomen is soft. She exhibits distension. There is abdominal tenderness.   Musculoskeletal:         General: No tenderness or edema.      Cervical back: Normal range of motion and neck supple.     Lymphadenopathy:     She has no cervical adenopathy.   Neurological: She is alert and oriented to person, place, and time. No cranial nerve deficit or sensory deficit.   Skin: Skin is warm and dry. Capillary refill takes less than 2 seconds.   Psychiatric: She has a normal mood and affect. Thought content normal.         Medical Screening Exam   See Full Note    ED Course   Procedures  Labs Reviewed   COMPREHENSIVE METABOLIC PANEL - Abnormal       Result Value    Sodium 144      Potassium 3.8      Chloride 115 (*)     CO2 23      Anion Gap 10      Glucose 112 (*)     BUN 16      Creatinine 0.98      BUN/Creatinine Ratio 16      Calcium 8.4 (*)     Total Protein 7.1      Albumin 3.4 (*)     Globulin 3.7      A/G Ratio 0.9      Bilirubin, Total 0.3      Alk Phos 143 (*)      (*)      (*)     eGFR 71     URINALYSIS, REFLEX TO URINE CULTURE - Abnormal    Color, UA Yellow      Clarity, UA Turbid      pH, UA 6.5      Leukocytes, UA Negative      Nitrites, UA Negative      Protein, UA Negative      Glucose, UA Normal      Ketones, UA Negative      Urobilinogen, UA 2 (*)     Bilirubin, UA Negative      Blood, UA Negative      Specific Gravity, UA 1.022     CBC WITH DIFFERENTIAL - Abnormal    WBC 6.89      RBC 3.85 (*)     Hemoglobin 12.0      Hematocrit 37.0 (*)     MCV 96.1 (*)     MCH 31.2 (*)     MCHC 32.4      RDW 13.0      Platelet Count 275      MPV 9.5      Neutrophils % 67.0 (*)     Lymphocytes % 22.5 (*)     Monocytes % 7.5 (*)     Eosinophils % 0.9 (*)     Basophils % 0.6      Immature Granulocytes % 1.5 (*)     nRBC, Auto 0.0      Neutrophils, Abs 4.62      Lymphocytes, Absolute 1.55      Monocytes, Absolute 0.52      Eosinophils, Absolute 0.06       Basophils, Absolute 0.04      Immature Granulocytes, Absolute 0.10 (*)     nRBC, Absolute 0.00      Diff Type Auto     TROPONIN I - Normal    Troponin I High Sensitivity 4.0     CBC W/ AUTO DIFFERENTIAL    Narrative:     The following orders were created for panel order CBC W/ AUTO DIFFERENTIAL.  Procedure                               Abnormality         Status                     ---------                               -----------         ------                     CBC with Differential[[id number]]       Abnormal            Final result                 Please view results for these tests on the individual orders.   LIPASE          Imaging Results               US Abdomen Limited (Final result)  Result time 10/12/24 00:23:38      Final result by Vinh Izquierdo MD (10/12/24 00:23:38)                   Impression:      Imaging features of cholelithiasis and/or sludge with cholecystitis.  However, no positive sonographic Guidry sign.  Correlate with need for HIDA scan versus MRCP.    Biliary ductal dilatation.  Focal point of obstruction not identified on this exam.    This report was flagged in Epic as abnormal.      Electronically signed by: Vinh Izquierdo  Date:    10/12/2024  Time:    00:23               Narrative:    EXAMINATION:  US ABDOMEN LIMITED    CLINICAL HISTORY:  RUQ pain;    TECHNIQUE:  Limited ultrasound of the right upper quadrant of the abdomen (including pancreas, liver, gallbladder, common bile duct, and spleen) was performed.    COMPARISON:  None.    FINDINGS:  Liver: Normal in size, measuring 12.5 cm. Homogeneous echotexture. No focal hepatic lesions.    Gallbladder: Echogenic sludge wall is thickened at 5 mm.  No hypervascularity.  No pericholecystic fluid.    Biliary system: The common duct is dilated, measuring 7 mm.  No intrahepatic ductal dilatation.    Spleen: Normal in size and echotexture, measuring 9.8 cm.    Miscellaneous: No upper abdominal ascites.                                        X-Ray Abdomen AP 1 View (KUB) (Final result)  Result time 10/11/24 23:57:21      Final result by Vinh Izquierdo MD (10/11/24 23:57:21)                   Impression:      No acute findings evident by plain film of the abdomen.      Electronically signed by: Vinh Izquierdo  Date:    10/11/2024  Time:    23:57               Narrative:    EXAMINATION:  XR ABDOMEN AP 1 VIEW    CLINICAL HISTORY:  Unspecified abdominal pain    TECHNIQUE:  AP View(s) of the abdomen was performed.    COMPARISON:  None    FINDINGS:  Bowel gas pattern not unusual.  There is no evidence of free air.  No organomegaly or abnormal calcification with phleboliths low in the pelvis.                                       X-Ray Chest AP Portable (Final result)  Result time 10/11/24 23:47:28      Final result by Vinh Izquierdo MD (10/11/24 23:47:28)                   Impression:      Stable and negative chest.      Electronically signed by: Vinh Izquierdo  Date:    10/11/2024  Time:    23:47               Narrative:    EXAMINATION:  XR CHEST AP PORTABLE    CLINICAL HISTORY:  Chest pain, unspecified    TECHNIQUE:  Single frontal view of the chest was performed.    COMPARISON:  04/30/2023    FINDINGS:  Heart mediastinal contours remain stable with the trachea midline.  The lungs and pleural spaces remain clear.                                       Medications   piperacillin-tazobactam (ZOSYN) 4.5 g in D5W 100 mL IVPB (MB+) (has no administration in time range)   piperacillin-tazobactam (ZOSYN) 4.5 g in D5W 100 mL IVPB (MB+) (has no administration in time range)   LIDOcaine (PF) 10 mg/ml (1%) injection 10 mg (has no administration in time range)   sodium chloride 0.9% flush 10 mL (has no administration in time range)   ondansetron disintegrating tablet 4 mg (has no administration in time range)   melatonin tablet 6 mg (has no administration in time range)   acetaminophen tablet 650 mg (has no administration in time range)   0.9%  NaCl  infusion (has no administration in time range)   acetaminophen tablet 650 mg (has no administration in time range)   HYDROmorphone injection 0.5 mg (has no administration in time range)   HYDROmorphone injection 1 mg (has no administration in time range)     Medical Decision Making  49 year old female presents to ED with complaint of abdominal pain. Patient reports she ate some fried shrimp from a fast food restaurant and started experiencing sharp pain to abdomen that radiated into her chest and into her back. She states she became diaphoretic and nauseated. Patient reports symptoms lasted approximately 45 minutes and stopped for an hour then returned. She states symptoms resolved in route to facility. She reports similar episodes 3 weeks ago after eating chicken salad and tuna salad. Patient reports she went to PCP's office on 10/8 and had labs and was scheduled to have an US of abdomen to rule out gallbladder issues. PMH of RSD, arthritis, GERD, hyperlipidemia, migraines, and Raynaud's.     Labs, diagnostics obtained/reviewed    Amount and/or Complexity of Data Reviewed  Labs: ordered. Decision-making details documented in ED Course.  Radiology: ordered.  ECG/medicine tests: ordered.  Discussion of management or test interpretation with external provider(s): Patient also seen by attending physician.  Case discussed with general surgeon Dr. Mazariegos.  attending physician exam at 1:00 a.m. shows positive Guidry sign.  Patient presents with a postprandial pain in the right upper quadrant several episodes over the past couple weeks.  Ultrasound shows gallbladder wall thickening and gallstones.  Treated with IV Zosyn.  Recent lipase normal.  Reviewed prior records.  Added lipase again transaminases slightly elevated and alkaline phosphate taste slightly elevated but bilirubin normal .  admitting patient    Risk  OTC drugs.  Prescription drug management.  Decision regarding hospitalization.              Attending  Attestation:   Physician Attestation Statement for Resident:  As the supervising MD   Physician Attestation Statement: I have personally seen and examined this patient.            Physician Attestation Statement for NP/PA:       Other NP/PA Attestation Additions:      Medical Decision Making: Physical exam shows positive Guidry sign.  Discussed with general surgeon.  Patient examined and manage by attending physician.  Consistent with a cholecystitis.  Treated with Zosyn.  Admitted to surgeon.  Transaminases and alkaline phosphatase elevated.  Bilirubin normal.  Reviewed prior records last lipase it was normal.  Added lipase to today's labs             ED Course as of 10/12/24 0241   Fri Oct 11, 2024   9009 Sign out.  Abdominal pain radiate to chest and back.  Ate fried shrimp at [employer] she says.  Labs on 10/8 by PCP and waiting for outpatient GB ultrasound.  Pain stopped on way here.  Told to come to ED if happens again. [PK]      ED Course User Index  [PK] Nicholas Quiles MD                           Clinical Impression:   Final diagnoses:  [R10.9] Abdominal pain  [R07.9] Chest pain  [K81.9] Cholecystitis (Primary)        ED Disposition Condition    Admit Stable                Nicholas Quiles MD  10/12/24 0113       Nicholas Quiles MD  10/12/24 0241

## 2024-10-12 NOTE — NURSING
Pt returned from surgery. Bedside report given. Pt is drowsy but awakes to verbal stimuli. Voices no needs. No distress noted. Vss. Lap sites c/d/I. Will cont to monitor

## 2024-10-12 NOTE — SUBJECTIVE & OBJECTIVE
No current facility-administered medications on file prior to encounter.     Current Outpatient Medications on File Prior to Encounter   Medication Sig    cyclobenzaprine (FLEXERIL) 5 MG tablet Take 1 tablet (5 mg total) by mouth 2 (two) times daily as needed for Muscle spasms.    ketorolac (TORADOL) 10 mg tablet Take 1 tablet (10 mg total) by mouth every 6 (six) hours. for 5 days    pantoprazole (PROTONIX) 40 MG tablet Take 1 tablet (40 mg total) by mouth once daily.    sumatriptan (IMITREX) 100 MG tablet Take 1 tablet (100 mg total) by mouth as needed for Migraine.    topiramate (TOPAMAX) 100 MG tablet Take 1 tablet (100 mg total) by mouth 2 (two) times a day.       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    OOPHORECTOMY      TONSILLECTOMY       Family History       Problem Relation (Age of Onset)    Atrial fibrillation Father    Cerebral palsy Daughter    Cirrhosis Mother    Hypertension Father    Hypothyroidism Mother    Liver Transplant Mother    No Known Problems Daughter, Son    Pancreatitis Brother          Tobacco Use    Smoking status: Every Day     Current packs/day: 0.00     Average packs/day: 0.5 packs/day for 25.0 years (12.5 ttl pk-yrs)     Types: Cigarettes, Vaping with nicotine     Start date: 1993     Last attempt to quit: 2018     Years since quittin.7     Passive exposure: Past    Smokeless tobacco: Never   Substance and Sexual Activity    Alcohol use: Yes     Alcohol/week:  7.0 standard drinks of alcohol     Types: 7 Drinks containing 0.5 oz of alcohol per week     Comment: 2 mixed drinks per week    Drug use: Not Currently     Types: Hydrocodone     Comment: past use    Sexual activity: Yes     Partners: Male     Birth control/protection: See Surgical Hx     Review of Systems   All other systems reviewed and are negative.    Objective:     Vital Signs (Most Recent):  Temp: 97.6 °F (36.4 °C) (10/12/24 0703)  Pulse: (!) 50 (10/12/24 0703)  Resp: 14 (10/12/24 0703)  BP: 100/72 (10/12/24 0703)  SpO2: 99 % (10/12/24 0703) Vital Signs (24h Range):  Temp:  [97.6 °F (36.4 °C)-98.7 °F (37.1 °C)] 97.6 °F (36.4 °C)  Pulse:  [50-82] 50  Resp:  [14-18] 14  SpO2:  [96 %-100 %] 99 %  BP: (100-133)/(72-83) 100/72     Weight: 63.7 kg (140 lb 6.9 oz)  Body mass index is 24.11 kg/m².     Physical Exam  Constitutional:       General: She is awake.      Appearance: Normal appearance.   HENT:      Head: Atraumatic.   Cardiovascular:      Rate and Rhythm: Normal rate and regular rhythm.      Heart sounds: Normal heart sounds.   Pulmonary:      Effort: Pulmonary effort is normal.      Breath sounds: Normal breath sounds.   Chest:      Chest wall: No deformity.   Abdominal:      General: There is no distension.      Palpations: Abdomen is soft. There is no mass.      Tenderness: There is abdominal tenderness (mild epigastric tenderness).   Musculoskeletal:         General: No swelling.      Right lower leg: No edema.      Left lower leg: No edema.   Skin:     General: Skin is warm and dry.      Capillary Refill: Capillary refill takes less than 2 seconds.   Neurological:      General: No focal deficit present.      Mental Status: She is alert.   Psychiatric:         Mood and Affect: Mood normal.            I have reviewed all pertinent lab results within the past 24 hours.  CBC:   Recent Labs   Lab 10/11/24  2312   WBC 6.89   RBC 3.85*   HGB 12.0   HCT 37.0*      MCV 96.1*   MCH 31.2*   MCHC 32.4      BMP:   Recent Labs   Lab 10/11/24  2312   *      K 3.8   *   CO2 23   BUN 16   CREATININE 0.98   CALCIUM 8.4*     CMP:   Recent Labs   Lab 10/11/24  2312   *   CALCIUM 8.4*   ALBUMIN 3.4*   PROT 7.1      K 3.8   CO2 23   *   BUN 16   CREATININE 0.98   ALKPHOS 143*   *   *   BILITOT 0.3     LFTs:   Recent Labs   Lab 10/11/24  2312   *   *   ALKPHOS 143*   BILITOT 0.3   PROT 7.1   ALBUMIN 3.4*       Significant Diagnostics:  I have reviewed all pertinent imaging results/findings within the past 24 hours.  U/S: I have reviewed all pertinent results/findings within the past 24 hours and my personal findings are:  per Radiology

## 2024-10-12 NOTE — HPI
This is a 49-year-old female patient who has been experiencing intermittent abdominal pain in the postprandial period that lasts about 45 minutes.  Onset is typically 1 hour after eating.  She states the pain radiates from the center of her abdomen around the size to her back.  She has nausea without emesis.  The most recent episode was more severe, prompting her to come to the emergency department.  A workup reveals gallstones on ultrasound with a gallbladder wall thickness of 5 mm and a prominent common bile duct is 7 mm.  She did not have elevated bilirubin although alkaline phosphatase was slightly elevated.  Transaminases were elevated as well.  Surgery was asked to evaluate and manage.

## 2024-10-12 NOTE — H&P
[facility] - [facility] Medical Telemetry  General Surgery  History & Physical    Patient Name: Rubia Contreras  MRN: [id number]  Admission Date: 10/11/2024  Attending Physician: Stevan Mazariegos MD  Primary Care Provider: Jaron Hugo FNP    Patient information was obtained from patient and ER records.     Subjective:     Chief Complaint/Reason for Admission: abd pain, nausea, suspected acute cholecystitis    History of Present Illness: This is a 49-year-old female patient who has been experiencing intermittent abdominal pain in the postprandial period that lasts about 45 minutes.  Onset is typically 1 hour after eating.  She states the pain radiates from the center of her abdomen around the size to her back.  She has nausea without emesis.  The most recent episode was more severe, prompting her to come to the emergency department.  A workup reveals gallstones on ultrasound with a gallbladder wall thickness of 5 mm and a prominent common bile duct is 7 mm.  She did not have elevated bilirubin although alkaline phosphatase was slightly elevated.  Transaminases were elevated as well.  Surgery was asked to evaluate and manage.    No current facility-administered medications on file prior to encounter.     Current Outpatient Medications on File Prior to Encounter   Medication Sig    cyclobenzaprine (FLEXERIL) 5 MG tablet Take 1 tablet (5 mg total) by mouth 2 (two) times daily as needed for Muscle spasms.    ketorolac (TORADOL) 10 mg tablet Take 1 tablet (10 mg total) by mouth every 6 (six) hours. for 5 days    pantoprazole (PROTONIX) 40 MG tablet Take 1 tablet (40 mg total) by mouth once daily.    sumatriptan (IMITREX) 100 MG tablet Take 1 tablet (100 mg total) by mouth as needed for Migraine.    topiramate (TOPAMAX) 100 MG tablet Take 1 tablet (100 mg total) by mouth 2 (two) times a day.       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    OOPHORECTOMY      TONSILLECTOMY       Family History       Problem Relation (Age of Onset)    Atrial fibrillation Father    Cerebral palsy Daughter    Cirrhosis Mother    Hypertension Father    Hypothyroidism Mother    Liver Transplant Mother    No Known Problems Daughter, Son    Pancreatitis Brother          Tobacco Use    Smoking status: Every Day     Current packs/day: 0.00     Average packs/day: 0.5 packs/day for 25.0 years (12.5 ttl pk-yrs)     Types: Cigarettes, Vaping with nicotine     Start date: 1993     Last attempt to quit: 2018     Years since quittin.7     Passive exposure: Past    Smokeless tobacco: Never   Substance and Sexual Activity    Alcohol use: Yes     Alcohol/week: 7.0 standard drinks of alcohol     Types: 7 Drinks containing 0.5 oz of alcohol per week     Comment: 2 mixed drinks per week    Drug use: Not Currently     Types: Hydrocodone     Comment: past use    Sexual activity: Yes     Partners: Male     Birth control/protection: See Surgical Hx     Review of Systems   All other systems reviewed and are negative.    Objective:     Vital Signs (Most Recent):  Temp: 97.6 °F (36.4 °C) (10/12/24 0703)  Pulse: (!) 50 (10/12/24 0703)  Resp: 14 (10/12/24 0703)  BP: 100/72 (10/12/24 0703)  SpO2: 99 % (10/12/24 0703) Vital Signs (24h Range):  Temp:  [97.6 °F (36.4 °C)-98.7 °F (37.1 °C)] 97.6 °F (36.4 °C)  Pulse:  [50-82] 50  Resp:  [14-18] 14  SpO2:  [96 %-100 %] 99 %  BP: (100-133)/(72-83) 100/72     Weight: 63.7 kg (140 lb 6.9 oz)  Body  mass index is 24.11 kg/m².     Physical Exam  Constitutional:       General: She is awake.      Appearance: Normal appearance.   HENT:      Head: Atraumatic.   Cardiovascular:      Rate and Rhythm: Normal rate and regular rhythm.      Heart sounds: Normal heart sounds.   Pulmonary:      Effort: Pulmonary effort is normal.      Breath sounds: Normal breath sounds.   Chest:      Chest wall: No deformity.   Abdominal:      General: There is no distension.      Palpations: Abdomen is soft. There is no mass.      Tenderness: There is abdominal tenderness (mild epigastric tenderness).   Musculoskeletal:         General: No swelling.      Right lower leg: No edema.      Left lower leg: No edema.   Skin:     General: Skin is warm and dry.      Capillary Refill: Capillary refill takes less than 2 seconds.   Neurological:      General: No focal deficit present.      Mental Status: She is alert.   Psychiatric:         Mood and Affect: Mood normal.            I have reviewed all pertinent lab results within the past 24 hours.  CBC:   Recent Labs   Lab 10/11/24  2312   WBC 6.89   RBC 3.85*   HGB 12.0   HCT 37.0*      MCV 96.1*   MCH 31.2*   MCHC 32.4     BMP:   Recent Labs   Lab 10/11/24  2312   *      K 3.8   *   CO2 23   BUN 16   CREATININE 0.98   CALCIUM 8.4*     CMP:   Recent Labs   Lab 10/11/24  2312   *   CALCIUM 8.4*   ALBUMIN 3.4*   PROT 7.1      K 3.8   CO2 23   *   BUN 16   CREATININE 0.98   ALKPHOS 143*   *   *   BILITOT 0.3     LFTs:   Recent Labs   Lab 10/11/24  2312   *   *   ALKPHOS 143*   BILITOT 0.3   PROT 7.1   ALBUMIN 3.4*       Significant Diagnostics:  I have reviewed all pertinent imaging results/findings within the past 24 hours.  U/S: I have reviewed all pertinent results/findings within the past 24 hours and my personal findings are:  per Radiology    Assessment/Plan:     No notes have been filed under this hospital  service.  Service: General Surgery    VTE Risk Mitigation (From admission, onward)           Ordered     Place BEBETO hose  Until discontinued         10/12/24 0112     IP VTE LOW RISK PATIENT  Once         10/12/24 0112                    Stevan Mazariegos MD  General Surgery  [facility] - [address] [place] [address]

## 2024-10-12 NOTE — ANESTHESIA POSTPROCEDURE EVALUATION
Anesthesia Post Evaluation    Patient: Rubia Contreras    Procedure(s) Performed: Procedure(s) (LRB):  CHOLECYSTECTOMY, LAPAROSCOPIC, WITH COMMON BILE DUCT EXPLORATION  CHOLANGIOGRAM (N/A)    [facility] Anesthesia Post Op Evaluation      Vitals Value Taken Time   /79 10/12/24 1102   Temp 36.6 °C (97.8 °F) 10/12/24 1043   Pulse 91 10/12/24 1104   Resp 14 10/12/24 1043   SpO2 96 % 10/12/24 1104   Vitals shown include unfiled device data.      No case tracking events are documented in the log.      Pain/Anna Score: Presence of Pain: non-verbal indicators present (10/12/2024  1:51 AM)  Anna Score: 8 (10/12/2024 10:40 AM)

## 2024-10-12 NOTE — ANESTHESIA PROCEDURE NOTES
Intubation    Date/Time: 10/12/2024 8:56 AM    Performed by: Sam Chavez II, CRNA  Authorized by: Julian Pelaez MD    Intubation:     Induction:  Intravenous    Intubated:  Postinduction    Mask Ventilation:  Easy mask    Attempts:  1    Attempted By:  CRNA    Method of Intubation:  Direct    Blade:  Joel 3    Laryngeal View Grade: Grade I - full view of cords      Difficult Airway Encountered?: No      Complications:  None    Airway Device:  Oral endotracheal tube    Airway Device Size:  7.0    Style/Cuff Inflation:  Cuffed    Inflation Amount (mL):  7    Tube secured:  22    Secured at:  The lips    Placement Verified By:  Capnometry    Complicating Factors:  None    Findings Post-Intubation:  BS equal bilateral and atraumatic/condition of teeth unchanged

## 2024-10-12 NOTE — NURSING
0200 Pt arrived to floor from ER via wheelchair. Bedside handoff received from MACARIO Velez RN. No distress noted at this time. Assessment documented in flowsheets.

## 2024-10-13 NOTE — DISCHARGE SUMMARY
[facility]  [facility]charge Note  Short Stay    Procedure(s) (LRB):  CHOLECYSTECTOMY, LAPAROSCOPIC, WITH COMMON BILE DUCT EXPLORATION  CHOLANGIOGRAM (N/A)      OUTCOME: Patient tolerated treatment/procedure well without complication and is now ready for discharge.    DISPOSITION: Home or Self Care    FINAL DIAGNOSIS:  Cholecystitis    FOLLOWUP: In clinic    DISCHARGE INSTRUCTIONS:  No discharge procedures on file.      Clinical Reference Documents Added to Patient Instructions         Document    CHOLECYSTECTOMY DISCHARGE INSTRUCTIONS (ENGLISH)            TIME SPENT ON DISCHARGE: 15 minutes

## 2024-10-15 PROBLEM — M54.6 ACUTE BILATERAL THORACIC BACK PAIN: Status: ACTIVE | Noted: 2024-10-15

## 2024-10-15 PROBLEM — R10.13 EPIGASTRIC PAIN: Status: ACTIVE | Noted: 2024-10-15

## 2024-10-15 LAB
ESTROGEN SERPL-MCNC: NORMAL PG/ML
INSULIN SERPL-ACNC: NORMAL U[IU]/ML
LAB AP GROSS DESCRIPTION: NORMAL
LAB AP LABORATORY NOTES: NORMAL
OHS QRS DURATION: 80 MS
OHS QTC CALCULATION: 409 MS
T3RU NFR SERPL: NORMAL %

## 2024-10-15 NOTE — PROGRESS NOTES
ELIZABETH JOHNSON   [facility]  [address]  [place]  [address]      PATIENT NAME: Rubia Contreras  : 1975  DATE: 10/8/24  MRN: [id number]        Reason for Visit / Chief Complaint: Abdominal Pain (Rubia Acosta 49 y.o female present to clinic severe abdominal pain.Pt stated pain starts in diaphragm and migrates to chest and back with no relieve. Pt stated symptoms are off and on each month. )         History of Present Illness / Problem Focused Workflow     49 y.o female present to clinic severe abdominal pain.Pt stated pain starts in diaphragm and migrates to chest and back with no relieve. Pt stated symptoms are off and on each month.         Review of Systems     @Review of Systems   Constitutional:  Negative for chills, fatigue and fever.   HENT:  Negative for nasal congestion, ear discharge, ear pain, rhinorrhea, sinus pressure/congestion and sore throat.    Respiratory:  Negative for cough, chest tightness, shortness of breath, wheezing and stridor.    Cardiovascular:  Negative for palpitations and claudication.   Gastrointestinal:  Positive for abdominal pain. Negative for constipation, diarrhea, nausea, vomiting and reflux.   Genitourinary:  Negative for dysuria, flank pain, frequency, hematuria and urgency.   Musculoskeletal:  Positive for back pain. Negative for myalgias.   Neurological:  Negative for dizziness, weakness, light-headedness and headaches.   Psychiatric/Behavioral:  Negative for suicidal ideas.        Medical / Social / Family History     Past Medical History:   Diagnosis Date    Arthritis     Heart burn     Hyperlipidemia     Migraines     Neuromuscular disorder     Raynaud's syndrome        Past Surgical History:   Procedure Laterality Date    ADENOIDECTOMY       SECTION      HYSTERECTOMY      total with 1 ovary preservation    LAPAROSCOPIC CHOLECYSTECTOMY WITH CHOLANGIOGRAPHY  10/12/2024    Procedure: CHOLECYSTECTOMY, LAPAROSCOPIC;   Surgeon: Stevan Mazariegos MD;  Location: [facility] OR;  Service: General;;    LAPAROSCOPIC EXPLORATION OF COMMON BILE DUCT N/A 10/12/2024    Procedure: EXPLORATION, COMMON BILE DUCT, LAPAROSCOPIC;  Surgeon: Stevan Mazariegos MD;  Location: 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 60 tablet 1    pantoprazole (PROTONIX) 40 MG tablet Take 1 tablet (40 mg total) by mouth once daily. 90 tablet 1    sumatriptan (IMITREX) 100 MG tablet Take 1 tablet (100 mg total) by mouth as needed for Migraine. 30 tablet 5    topiramate (TOPAMAX) 100 MG tablet Take 1 tablet (100 mg total) by mouth 2 (two) times a day. 180 tablet 1       Allergies  Review of patient's allergies indicates:  No Known Allergies    Physical Examination     Vitals:    10/08/24 0812   BP: 110/76   Pulse: 67   Resp: 18   Temp: 98.3 °F (36.8 °C)     Physical Exam  Vitals and nursing note reviewed.   Constitutional:       General: She is awake.      Appearance: Normal appearance.   HENT:      Head: Normocephalic.      Right Ear: Tympanic membrane, ear canal and external ear normal.      Left Ear: Tympanic membrane, ear canal and external ear normal.      Nose: Nose normal.      Mouth/Throat:      Lips: Pink.      Mouth: Mucous membranes are moist.      Pharynx: Oropharynx is clear. Uvula midline.   Cardiovascular:      Rate and Rhythm: Normal rate and regular rhythm.      Heart sounds: Normal heart sounds, S1  normal and S2 normal.   Pulmonary:      Effort: Pulmonary effort is normal. No respiratory distress.      Breath sounds: Normal breath sounds. No decreased breath sounds, wheezing, rhonchi or rales.   Abdominal:      General: Bowel sounds are normal.      Palpations: Abdomen is soft.      Tenderness: There is no abdominal tenderness.   Musculoskeletal:      Cervical back: Normal range of motion.   Skin:     General: Skin is warm.      Capillary Refill: Capillary refill takes less than 2 seconds.   Neurological:      Mental Status: She is alert and oriented to person, place, and time.   Psychiatric:         Thought Content: Thought content does not include homicidal or suicidal ideation. Thought content does not include homicidal or suicidal plan.               Procedures   Assessment and Plan (including Health Maintenance)   :    Plan:     Problem List Items Addressed This Visit          GI    Epigastric pain - Primary    Current Assessment & Plan     UA obtained and culture sent. Will call with results and any new orders. Lab work today. US abdomen will be scheduled. RTC with worsening, new or persistent symptoms         Relevant Orders    CBC Auto Differential (Completed)    Comprehensive Metabolic Panel (Completed)    Amylase (Completed)    Lipase (Completed)    US Abdomen Complete    POCT URINALYSIS W/O SCOPE (Completed)       Orthopedic    Acute bilateral thoracic back pain    Relevant Orders    CBC Auto Differential (Completed)    Comprehensive Metabolic Panel (Completed)    Amylase (Completed)    Lipase (Completed)       Health Maintenance Topics with due status: Not Due       Topic Last Completion Date    Colorectal Cancer Screening 05/17/2023    Lipid Panel 06/26/2024    Mammogram 07/17/2024    RSV Vaccine (Age 60+ and Pregnant patients) Not Due       Future Appointments   Date Time Provider Department Center   10/22/2024 11:00 AM Stevan Mazariegos MD [facility]   11/5/2024 10:45 AM Carol Hernandez  MD [facility] [place] [place]   6/26/2025  8:00 AM Jaron Hugo FNP [facility]   7/23/2025 11:00 AM [facility] [place]        Health Maintenance Due   Topic Date Due    Hepatitis C Screening  Never done    Pneumococcal Vaccines (Age 0-64) (1 of 2 - PCV) Never done    TETANUS VACCINE  Never done    Influenza Vaccine (1) 09/01/2024    COVID-19 Vaccine (1 - 2024-25 season) Never done        Follow up if symptoms worsen or fail to improve.     Signature:  ELIZABETH JOHNSON  [address] [place]  [address]    Date of encounter: 10/8/24

## 2024-10-15 NOTE — ASSESSMENT & PLAN NOTE
UA obtained and culture sent. Will call with results and any new orders. Lab work today. US abdomen will be scheduled. RTC with worsening, new or persistent symptoms

## 2024-10-22 ENCOUNTER — OFFICE VISIT (OUTPATIENT)
Dept: SURGERY | Facility: CLINIC | Age: 49
End: 2024-10-22
Attending: SURGERY
Payer: COMMERCIAL

## 2024-10-22 VITALS — HEIGHT: 65 IN | WEIGHT: 141.19 LBS | BODY MASS INDEX: 23.52 KG/M2

## 2024-10-22 DIAGNOSIS — Z09 POSTOP CHECK: Primary | ICD-10-CM

## 2024-10-22 PROCEDURE — 1159F MED LIST DOCD IN RCRD: CPT | Mod: ,,, | Performed by: SURGERY

## 2024-10-22 PROCEDURE — 99213 OFFICE O/P EST LOW 20 MIN: CPT | Mod: PBBFAC | Performed by: SURGERY

## 2024-10-22 PROCEDURE — 99999 PR PBB SHADOW E&M-EST. PATIENT-LVL III: CPT | Mod: PBBFAC,,, | Performed by: SURGERY

## 2024-10-22 PROCEDURE — 99024 POSTOP FOLLOW-UP VISIT: CPT | Mod: ,,, | Performed by: SURGERY

## 2024-10-22 NOTE — PROGRESS NOTES
Status post lap lindy and common duct exploration was spyglass.  Patient reports that she has some problems with a low energy and some difficulties with abdominal pain.  She relates some of this to the fact that she did not have a bowel movement for a proximally 1 week after surgery.  She has felt better the past few days.      On exam her incisions have healed nicely.  There was evidence of resolving ecchymosis.    Follow up with me PRN.  Activity was discussed going forward.

## 2025-01-06 DIAGNOSIS — K21.9 GASTROESOPHAGEAL REFLUX DISEASE WITHOUT ESOPHAGITIS: ICD-10-CM

## 2025-01-06 DIAGNOSIS — G43.011 INTRACTABLE MIGRAINE WITHOUT AURA AND WITH STATUS MIGRAINOSUS: ICD-10-CM

## 2025-01-06 NOTE — TELEPHONE ENCOUNTER
Upcoming regular appointment scheduled 6/26/2024. Patient is requesting refills on Protonix and Topimax sent to [facility] in [place]. Last seen 10/8/2024.

## 2025-01-07 RX ORDER — TOPIRAMATE 100 MG/1
100 TABLET, FILM COATED ORAL 2 TIMES DAILY
Qty: 180 TABLET | Refills: 1 | Status: SHIPPED | OUTPATIENT
Start: 2025-01-07

## 2025-01-07 RX ORDER — PANTOPRAZOLE SODIUM 40 MG/1
40 TABLET, DELAYED RELEASE ORAL DAILY
Qty: 90 TABLET | Refills: 1 | Status: SHIPPED | OUTPATIENT
Start: 2025-01-07

## 2025-03-13 ENCOUNTER — OFFICE VISIT (OUTPATIENT)
Dept: FAMILY MEDICINE | Facility: CLINIC | Age: 50
End: 2025-03-13
Payer: COMMERCIAL

## 2025-03-13 VITALS
OXYGEN SATURATION: 99 % | TEMPERATURE: 99 F | DIASTOLIC BLOOD PRESSURE: 74 MMHG | WEIGHT: 160.38 LBS | BODY MASS INDEX: 26.72 KG/M2 | RESPIRATION RATE: 24 BRPM | SYSTOLIC BLOOD PRESSURE: 106 MMHG | HEIGHT: 65 IN | HEART RATE: 71 BPM

## 2025-03-13 DIAGNOSIS — R09.81 NASAL CONGESTION: ICD-10-CM

## 2025-03-13 DIAGNOSIS — J02.9 SORE THROAT: ICD-10-CM

## 2025-03-13 DIAGNOSIS — J01.40 ACUTE PANSINUSITIS, RECURRENCE NOT SPECIFIED: Primary | ICD-10-CM

## 2025-03-13 DIAGNOSIS — R05.9 COUGH, UNSPECIFIED TYPE: ICD-10-CM

## 2025-03-13 DIAGNOSIS — R50.9 FEVER, UNSPECIFIED FEVER CAUSE: ICD-10-CM

## 2025-03-13 DIAGNOSIS — R51.9 ACUTE NONINTRACTABLE HEADACHE, UNSPECIFIED HEADACHE TYPE: ICD-10-CM

## 2025-03-13 LAB
CTP QC/QA: YES
MOLECULAR STREP A: NEGATIVE
POC MOLECULAR INFLUENZA A AGN: NEGATIVE
POC MOLECULAR INFLUENZA B AGN: NEGATIVE
SARS-COV-2 RDRP RESP QL NAA+PROBE: NEGATIVE

## 2025-03-13 PROCEDURE — 87502 INFLUENZA DNA AMP PROBE: CPT | Mod: QW,,,

## 2025-03-13 PROCEDURE — 87651 STREP A DNA AMP PROBE: CPT | Mod: QW,,,

## 2025-03-13 RX ORDER — AMOXICILLIN 500 MG/1
500 TABLET, FILM COATED ORAL EVERY 12 HOURS
Qty: 20 TABLET | Refills: 0 | Status: SHIPPED | OUTPATIENT
Start: 2025-03-13 | End: 2025-03-23

## 2025-03-13 RX ORDER — CEFTRIAXONE 1 G/1
1 INJECTION, POWDER, FOR SOLUTION INTRAMUSCULAR; INTRAVENOUS
Status: COMPLETED | OUTPATIENT
Start: 2025-03-13 | End: 2025-03-13

## 2025-03-13 RX ORDER — DEXAMETHASONE SODIUM PHOSPHATE 4 MG/ML
4 INJECTION, SOLUTION INTRA-ARTICULAR; INTRALESIONAL; INTRAMUSCULAR; INTRAVENOUS; SOFT TISSUE
Status: COMPLETED | OUTPATIENT
Start: 2025-03-13 | End: 2025-03-13

## 2025-03-13 RX ORDER — DEXTROMETHORPHAN HBR, PHENYLEPHRINE HCL, PYRILAMINE MALEATE 7.5; 5; 12.5 MG/5ML; MG/5ML; MG/5ML
10 SYRUP ORAL EVERY 6 HOURS PRN
Qty: 200 ML | Refills: 0 | Status: SHIPPED | OUTPATIENT
Start: 2025-03-13

## 2025-03-13 RX ORDER — CHLORPHENIRAMINE MALEATE AND PHENYLEPHRINE HYDROCHLORIDE 4; 10 MG/1; MG/1
1 TABLET, COATED ORAL EVERY 4 HOURS PRN
Qty: 30 TABLET | Refills: 0 | Status: SHIPPED | OUTPATIENT
Start: 2025-03-13 | End: 2025-03-23

## 2025-03-13 RX ADMIN — CEFTRIAXONE 1 G: 1 INJECTION, POWDER, FOR SOLUTION INTRAMUSCULAR; INTRAVENOUS at 10:03

## 2025-03-13 RX ADMIN — DEXAMETHASONE SODIUM PHOSPHATE 4 MG: 4 INJECTION, SOLUTION INTRA-ARTICULAR; INTRALESIONAL; INTRAMUSCULAR; INTRAVENOUS; SOFT TISSUE at 10:03

## 2025-03-13 NOTE — PROGRESS NOTES
ELIZABETH JOHNSON   [facility]  [id number] [place] [id number]  [phone]      PATIENT NAME: Rubia Contreras  : 1975  DATE: 3/13/25  MRN: [id number]      Billing Provider: ELIZABETH JOHNSON  Level of Service: NC OFFICE/OUTPT VISIT, EST, LEVL IV, 30-39 MIN  Patient PCP Information       Provider PCP Type    ELIZABETH JOHNSON General            Chief Complaint   Patient presents with    Sore Throat     Patient is a 49 year old female who presents to the clinic related to sore throat since Tuesday.     Nasal Congestion     Nasal congestion for over 1 week.    Cough     Dry cough x 1 week.      Fever     Fever 100.8 last night.      Headache     Headache since last night, patient does have migraines.           Medications and Allergies     Medications  Outpatient Medications Marked as Taking for the 3/13/25 encounter (Office Visit) with Jaron Hugo FNP   Medication Sig Dispense Refill    cyclobenzaprine (FLEXERIL) 5 MG tablet Take 1 tablet (5 mg total) by mouth 2 (two) times daily as needed for Muscle spasms. 60 tablet 1    pantoprazole (PROTONIX) 40 MG tablet Take 1 tablet (40 mg total) by mouth once daily. 90 tablet 1    sumatriptan (IMITREX) 100 MG tablet Take 1 tablet (100 mg total) by mouth as needed for Migraine. 30 tablet 5    topiramate (TOPAMAX) 100 MG tablet Take 1 tablet (100 mg total) by mouth 2 (two) times a day. 180 tablet 1     Current Facility-Administered Medications for the 3/13/25 encounter (Office Visit) with Jaron Hugo FNP   Medication Dose Route Frequency Provider Last Rate Last Admin    [COMPLETED] cefTRIAXone injection 1 g  1 g Intramuscular 1 time in Clinic/HOD Jaron Hugo FNP   1 g at 25 1010    [COMPLETED] dexAMETHasone injection 4 mg  4 mg Intramuscular 1 time in Clinic/HOD Jaron Hugo FNP   4 mg at 25 1010       Allergies  Review of patient's allergies indicates:  No Known  Allergies    History of Present Illness    CHIEF COMPLAINT:  Patient presents today with sore throat and cough. Symptoms of cough and sinus pressure started about 1 weeks ago and has not improved    HISTORY OF PRESENT ILLNESS:  She reports a sore throat that began Tuesday night with continuous worsening since onset. The throat pain is described as severe and differs from her previous experiences of throat discomfort due to sinus drainage. She has never had strep throat before. She has had nasal congestion and cough for over a week, with sinus drainage that worsens at night and early morning. The cough has been disrupting her sleep. She experiences headache and fever. She also notes ear pain with coughing and a popping sensation when blowing her nose. She denies body aches.             Review of Systems   Constitutional:  Positive for fever. Negative for chills and fatigue.   HENT:  Positive for sinus pressure, sinus pain and sore throat. Negative for congestion, ear discharge and ear pain.    Respiratory:  Positive for cough. Negative for chest tightness, shortness of breath and wheezing.    Cardiovascular:  Negative for chest pain and palpitations.   Gastrointestinal:  Negative for abdominal pain, constipation, diarrhea, nausea and vomiting.   Genitourinary:  Negative for dysuria, flank pain and hematuria.   Neurological:  Positive for headaches. Negative for dizziness, weakness and light-headedness.   Psychiatric/Behavioral:  Negative for suicidal ideas.        Physical Exam  Vitals and nursing note reviewed.   Constitutional:       General: She is awake.      Appearance: Normal appearance.   HENT:      Head: Normocephalic.      Right Ear: Tympanic membrane, ear canal and external ear normal.      Left Ear: Tympanic membrane, ear canal and external ear normal.      Nose: Congestion present.      Right Turbinates: Swollen.      Left Turbinates: Swollen.      Right Sinus: Maxillary sinus tenderness and frontal sinus  tenderness present.      Left Sinus: Maxillary sinus tenderness and frontal sinus tenderness present.      Mouth/Throat:      Lips: Pink.      Mouth: Mucous membranes are moist.      Pharynx: Oropharynx is clear. Uvula midline. Posterior oropharyngeal erythema and postnasal drip present.   Cardiovascular:      Rate and Rhythm: Normal rate and regular rhythm.      Heart sounds: Normal heart sounds, S1 normal and S2 normal.   Pulmonary:      Effort: Pulmonary effort is normal. No respiratory distress.      Breath sounds: Normal breath sounds. No decreased breath sounds, wheezing, rhonchi or rales.   Abdominal:      General: Bowel sounds are normal.      Palpations: Abdomen is soft.      Tenderness: There is no abdominal tenderness.   Musculoskeletal:      Cervical back: Normal range of motion.   Skin:     General: Skin is warm.      Capillary Refill: Capillary refill takes less than 2 seconds.   Neurological:      Mental Status: She is alert and oriented to person, place, and time.   Psychiatric:         Thought Content: Thought content does not include homicidal or suicidal ideation. Thought content does not include homicidal or suicidal plan.         Assessment & Plan    IMPRESSION:  - Evaluated patient presenting with sore throat, cough, and sinus symptoms  - Noted fluid in ear and significant drainage in throat  - Considered possibility of false negative strep test given clinical presentation  - Considered recent cases of delayed positive flu tests in other patients      - Evaluated the patient's ears and observed fluid in one ear with slight redness.  - Patient reports ear discomfort and popping when coughing or blowing nose.    ACUTE PANSINUSITIS:  - Performed a strep test, which yielded a negative result.  -Rapid covid and flu test negative today  - Examined the patient's throat and observed significant drainage.  - Patient reports sore throat starting Tuesday night, progressively worsening.  - Observed  significant drainage in the patient's throat.    POSTNASAL DRIP:  - Patient reports sinus drainage affecting throat, especially at night and early morning.    ACUTE COUGH:  - Auscultated the patient's lungs to assess breathing.  - Patient reports coughing for over a week, worsening and keeping them up at night.    HEADACHE:  - Patient reports headache as one of the symptoms.           Health Maintenance Due   Topic Date Due    Hepatitis C Screening  Never done    TETANUS VACCINE  Never done    Hemoglobin A1c (Diabetic Prevention Screening)  Never done    Influenza Vaccine (1) 09/01/2024    COVID-19 Vaccine (1 - 2024-25 season) Never done       Problem List Items Addressed This Visit       Acute pansinusitis - Primary    Current Assessment & Plan   Rocephin IM and Decadron IM today. Amoxicillin, Ed-a-Hist, polytussin RX. Medication instructions and education given with understanding voiced. Rest, increase fluids. OTC antihistamines, decongestants, Ibuprofen, Tylenol as needed. RTC with worsening, new or persistent symptoms.           Relevant Medications    cefTRIAXone injection 1 g (Completed)    dexAMETHasone injection 4 mg (Completed)    amoxicillin (AMOXIL) 500 MG Tab    pyrilamine-phenylephrine-DM (POLYTUSSIN DM,PYRILAMINE,) 12.5-5-7.5 mg/5 mL Liqd    chlorpheniramine-phenylephrine (ED A-HIST) 4-10 mg per tablet     Other Visit Diagnoses         Sore throat        Relevant Orders    POCT COVID-19 Rapid Screening (Completed)    POCT Influenza A/B Molecular (Completed)    POCT Strep A, Molecular (Completed)      Nasal congestion        Relevant Orders    POCT COVID-19 Rapid Screening (Completed)    POCT Influenza A/B Molecular (Completed)    POCT Strep A, Molecular (Completed)      Cough, unspecified type        Relevant Orders    POCT COVID-19 Rapid Screening (Completed)    POCT Influenza A/B Molecular (Completed)    POCT Strep A, Molecular (Completed)      Fever, unspecified fever cause        Relevant Orders     POCT COVID-19 Rapid Screening (Completed)    POCT Influenza A/B Molecular (Completed)    POCT Strep A, Molecular (Completed)      Acute nonintractable headache, unspecified headache type        Relevant Orders    POCT COVID-19 Rapid Screening (Completed)    POCT Influenza A/B Molecular (Completed)    POCT Strep A, Molecular (Completed)            Health Maintenance Topics with due status: Not Due       Topic Last Completion Date    Colorectal Cancer Screening 05/17/2023    Lipid Panel 06/26/2024    Mammogram 07/17/2024    RSV Vaccine (Age 60+ and Pregnant patients) Not Due       Future Appointments   Date Time Provider Department Center   6/26/2025  8:00 AM Jaron Hugo FNP [facility]   7/23/2025 11:00 AM [facility]        This note was generated with the assistance of ambient listening technology. Verbal consent was obtained by the patient and accompanying visitor(s) for the recording of patient appointment to facilitate this note. I attest to having reviewed and edited the generated note for accuracy, though some syntax or spelling errors may persist. Please contact the author of this note for any clarification.     Signature:  ELIZABETH JOHNSON  [facility] [address] [place] [address]  [phone]    Date of encounter: 3/13/25

## 2025-03-13 NOTE — ASSESSMENT & PLAN NOTE
Rocephin IM and Decadron IM today. Amoxicillin, Ed-a-Hist, polytussin RX. Medication instructions and education given with understanding voiced. Rest, increase fluids. OTC antihistamines, decongestants, Ibuprofen, Tylenol as needed. RTC with worsening, new or persistent symptoms.

## 2025-05-18 ENCOUNTER — PATIENT MESSAGE (OUTPATIENT)
Dept: FAMILY MEDICINE | Facility: CLINIC | Age: 50
End: 2025-05-18
Payer: COMMERCIAL

## 2025-06-06 DIAGNOSIS — H66.90 OTITIS MEDIA, UNSPECIFIED LATERALITY, UNSPECIFIED OTITIS MEDIA TYPE: Primary | ICD-10-CM

## 2025-06-06 RX ORDER — CEFDINIR 300 MG/1
300 CAPSULE ORAL 2 TIMES DAILY
Qty: 20 CAPSULE | Refills: 0 | Status: SHIPPED | OUTPATIENT
Start: 2025-06-06 | End: 2025-06-16

## 2025-06-06 RX ORDER — OFLOXACIN 3 MG/ML
5 SOLUTION AURICULAR (OTIC) 2 TIMES DAILY
Qty: 10 ML | Refills: 0 | Status: SHIPPED | OUTPATIENT
Start: 2025-06-06

## 2025-06-13 ENCOUNTER — TELEPHONE (OUTPATIENT)
Dept: FAMILY MEDICINE | Facility: CLINIC | Age: 50
End: 2025-06-13
Payer: COMMERCIAL

## 2025-06-13 NOTE — TELEPHONE ENCOUNTER
Copied from CRM #[id number]. Topic: General Inquiry - Return Call  >> Jun 13, 2025  9:24 AM Dayron wrote:  Who Called: Rubia Contreras    Caller is requesting assistance/information from provider's office.        Preferred Method of Contact: Phone Call  Patient's Preferred Phone Number on File: [phone]   Best Call Back Number, if different:  Additional Information: Patient is requesting a call back from the nurse. She states that the medication and ear drops that were prescribed to her is not working. She states that her left ear is still hurting really bad

## 2025-06-16 ENCOUNTER — OFFICE VISIT (OUTPATIENT)
Dept: FAMILY MEDICINE | Facility: CLINIC | Age: 50
End: 2025-06-16
Payer: COMMERCIAL

## 2025-06-16 VITALS
SYSTOLIC BLOOD PRESSURE: 122 MMHG | TEMPERATURE: 98 F | HEART RATE: 84 BPM | DIASTOLIC BLOOD PRESSURE: 88 MMHG | BODY MASS INDEX: 27.32 KG/M2 | OXYGEN SATURATION: 97 % | HEIGHT: 65 IN | WEIGHT: 164 LBS | RESPIRATION RATE: 24 BRPM

## 2025-06-16 DIAGNOSIS — H93.19 TINNITUS, UNSPECIFIED LATERALITY: ICD-10-CM

## 2025-06-16 DIAGNOSIS — H91.92 HEARING LOSS OF LEFT EAR, UNSPECIFIED HEARING LOSS TYPE: ICD-10-CM

## 2025-06-16 DIAGNOSIS — R42 DIZZINESS: ICD-10-CM

## 2025-06-16 DIAGNOSIS — H92.02 LEFT EAR PAIN: Primary | ICD-10-CM

## 2025-06-16 LAB
CTP QC/QA: YES
MOLECULAR STREP A: NEGATIVE

## 2025-06-16 RX ORDER — AMOXICILLIN 500 MG/1
500 TABLET, FILM COATED ORAL EVERY 12 HOURS
Qty: 20 TABLET | Refills: 0 | Status: SHIPPED | OUTPATIENT
Start: 2025-06-16 | End: 2025-06-26

## 2025-06-16 RX ORDER — PREDNISONE 20 MG/1
20 TABLET ORAL 2 TIMES DAILY
Qty: 6 TABLET | Refills: 0 | Status: SHIPPED | OUTPATIENT
Start: 2025-06-16

## 2025-06-16 RX ORDER — KETOROLAC TROMETHAMINE 10 MG/1
10 TABLET, FILM COATED ORAL EVERY 6 HOURS
Qty: 20 TABLET | Refills: 0 | Status: SHIPPED | OUTPATIENT
Start: 2025-06-16 | End: 2025-06-21

## 2025-06-16 RX ORDER — DEXAMETHASONE SODIUM PHOSPHATE 4 MG/ML
4 INJECTION, SOLUTION INTRA-ARTICULAR; INTRALESIONAL; INTRAMUSCULAR; INTRAVENOUS; SOFT TISSUE
Status: COMPLETED | OUTPATIENT
Start: 2025-06-16 | End: 2025-06-16

## 2025-06-16 RX ORDER — KETOROLAC TROMETHAMINE 30 MG/ML
30 INJECTION, SOLUTION INTRAMUSCULAR; INTRAVENOUS
Status: COMPLETED | OUTPATIENT
Start: 2025-06-16 | End: 2025-06-16

## 2025-06-16 RX ADMIN — KETOROLAC TROMETHAMINE 30 MG: 30 INJECTION, SOLUTION INTRAMUSCULAR; INTRAVENOUS at 04:06

## 2025-06-16 RX ADMIN — DEXAMETHASONE SODIUM PHOSPHATE 4 MG: 4 INJECTION, SOLUTION INTRA-ARTICULAR; INTRALESIONAL; INTRAMUSCULAR; INTRAVENOUS; SOFT TISSUE at 04:06

## 2025-06-20 PROBLEM — H92.02 LEFT EAR PAIN: Status: ACTIVE | Noted: 2025-06-20

## 2025-06-20 NOTE — PROGRESS NOTES
"   MONSE VILLEDA, TARA   [facility]  [address]  [place]  [address]        PATIENT NAME: Rubia Contreras  : 1975  DATE: 25  MRN: [id number]        Reason for Visit / Chief Complaint: Dizziness (Patient is a 50 year old male who presents to the clinic related to dizziness, ringing in ears, since yesterday.  Patient has completed 10 days of antibiotics & ear drops, still complaints of left ear pain, "hasn't improved at all",  Not able to hear, requesting referral to ENT.  Patient does have a history of having ear drums patched.  Has an appointment with [facility] for TMJ 2025), Facial Swelling (Facial swelling on left side x 3 days ago.), and Health Maintenance (Hepatitis C Screening Never done/TETANUS VACCINE Never done/Hemoglobin A1c (Diabetic Prevention Screening) Never done/COVID-19 Vaccine( season) Never done/Shingles Vaccine(1 of 2) Never done/Pneumococcal Vaccines (Age 50+)(1 of 1 - PCV) Never done/Mammogram due on 2025 //Patient declines all care gaps at current time. )       Update PCP  Update Chief Complaint         History of Present Illness / Problem Focused Workflow     Rubia Contreras presents to the clinic with Dizziness (Patient is a 50 year old male who presents to the clinic related to dizziness, ringing in ears, since yesterday.  Patient has completed 10 days of antibiotics & ear drops, still complaints of left ear pain, "hasn't improved at all",  Not able to hear, requesting referral to ENT.  Patient does have a history of having ear drums patched.  Has an appointment with [facility] for TMJ 2025), Facial Swelling (Facial swelling on left side x 3 days ago.), and Health Maintenance (Hepatitis C Screening Never done/TETANUS VACCINE Never done/Hemoglobin A1c (Diabetic Prevention Screening) Never done/COVID-19 Vaccine( season) Never done/Shingles Vaccine(1 of 2) Never " done/Pneumococcal Vaccines (Age 50+)(1 of 1 - PCV) Never done/Mammogram due on 2025 //Patient declines all care gaps at current time. )     51 y/o female presents to clinic with complaints of left ear pain, dizziness, decreased hearing. Pt states she completed Omnicef RX and has not improved. Pt taking OTC pain relievers with no relief. Denies any fever, chills,  drainage, HA. Pt does have hx of chronic ear problems as a child.         Review of Systems     Review of Systems   Constitutional:  Negative for chills, fatigue and fever.   HENT:  Positive for ear pain. Negative for nasal congestion, ear discharge, rhinorrhea, sinus pressure/congestion and sore throat.    Respiratory:  Negative for cough, chest tightness, shortness of breath, wheezing and stridor.    Cardiovascular:  Negative for palpitations and claudication.   Gastrointestinal:  Negative for abdominal pain, constipation, diarrhea, nausea, vomiting and reflux.   Genitourinary:  Negative for dysuria, flank pain, frequency, hematuria and urgency.   Musculoskeletal:  Negative for myalgias.   Neurological:  Negative for dizziness, weakness, light-headedness and headaches.   Psychiatric/Behavioral:  Negative for suicidal ideas.         Medical / Social / Family History     Past Medical History:   Diagnosis Date    Arthritis     Heart burn     Hyperlipidemia     Migraines     Neuromuscular disorder     Raynaud's syndrome     TMJ (dislocation of temporomandibular joint)     Dr. Puentes/[place]       Past Surgical History:   Procedure Laterality Date    ADENOIDECTOMY       SECTION      HYSTERECTOMY      total with 1 ovary preservation    LAPAROSCOPIC CHOLECYSTECTOMY WITH CHOLANGIOGRAPHY  10/12/2024    Procedure: CHOLECYSTECTOMY, LAPAROSCOPIC;  Surgeon: Stevan Mazariegos MD;  Location: [facility];  Service: General;;    LAPAROSCOPIC EXPLORATION OF COMMON BILE DUCT N/A 10/12/2024    Procedure: EXPLORATION, COMMON BILE DUCT, LAPAROSCOPIC;  Surgeon:  Stevan Mazariegos MD;  Location: [facility] OR;  Service: General;  Laterality: N/A;    NERVE GRAFT Left     nerve removed from left ankle to repair radial nerve in left wrist    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  reports that she quit smoking about 7 years ago. Her smoking use included cigarettes and vaping with nicotine. She started smoking about 32 years ago. She has a 12.5 pack-year smoking history. She has been exposed to tobacco smoke. She has never used smokeless tobacco. She reports that she does not currently use alcohol. She reports that she does not currently use drugs after having used the following drugs: Hydrocodone.    Family History  Ms.'s family history includes Atrial fibrillation in her father; Cerebral palsy in her daughter; Cirrhosis in her mother; Hypertension in her father; Hypothyroidism in her mother; Liver Transplant in her mother; No Known Problems in her daughter and son; Pancreatitis in her brother.    Medications and Allergies     Medications  Outpatient Medications Marked as Taking for the 6/16/25 encounter (Office Visit) with Jaron Hugo FNP   Medication Sig Dispense Refill    cyclobenzaprine (FLEXERIL) 5 MG tablet Take 1 tablet (5 mg total) by mouth 2 (two) times daily as needed for Muscle spasms. 60 tablet 1    pantoprazole (PROTONIX) 40 MG tablet Take 1 tablet (40 mg total) by mouth once daily. 90 tablet 1    sumatriptan (IMITREX) 100 MG tablet Take 1 tablet (100 mg total) by mouth as needed for Migraine. 30 tablet 5    topiramate (TOPAMAX) 100 MG tablet Take 1 tablet (100 mg total) by mouth 2 (two) times a day. 180 tablet 1       Allergies  Review of patient's allergies indicates:  No Known Allergies    Physical Examination   /88 (BP Location: Right arm, Patient Position: Standing)   Pulse 84   Temp 98.1 °F (36.7 °C) (Oral)   Resp (!) 24   " Ht 5' 4.5" (1.638 m)   Wt 74.4 kg (164 lb)   LMP  (LMP Unknown)   SpO2 97%   BMI 27.72 kg/m²    Physical Exam  Vitals and nursing note reviewed.   Constitutional:       General: She is awake.      Appearance: Normal appearance.   HENT:      Head: Normocephalic.      Right Ear: Tympanic membrane, ear canal and external ear normal.      Left Ear: Ear canal and external ear normal. Drainage and tenderness present. A middle ear effusion is present. There is impacted cerumen. Tympanic membrane is erythematous.      Nose: Nose normal.      Mouth/Throat:      Lips: Pink.      Mouth: Mucous membranes are moist.      Pharynx: Oropharynx is clear. Uvula midline. Posterior oropharyngeal erythema present.   Cardiovascular:      Rate and Rhythm: Normal rate and regular rhythm.      Heart sounds: Normal heart sounds, S1 normal and S2 normal.   Pulmonary:      Effort: Pulmonary effort is normal. No respiratory distress.      Breath sounds: Normal breath sounds. No decreased breath sounds, wheezing, rhonchi or rales.   Abdominal:      General: Bowel sounds are normal.      Palpations: Abdomen is soft.      Tenderness: There is no abdominal tenderness.   Musculoskeletal:      Cervical back: Normal range of motion.   Skin:     General: Skin is warm.      Capillary Refill: Capillary refill takes less than 2 seconds.   Neurological:      Mental Status: She is alert and oriented to person, place, and time.   Psychiatric:         Thought Content: Thought content does not include homicidal or suicidal ideation. Thought content does not include homicidal or suicidal plan.          Assessment and Plan (including Health Maintenance)      Problem List  Smart Sets  Document Outside HM   :      Health Maintenance Due   Topic Date Due    Hepatitis C Screening  Never done    TETANUS VACCINE  Never done    Hemoglobin A1c (Diabetic Prevention Screening)  Never done    COVID-19 Vaccine (1 - 2024-25 season) Never done    Shingles Vaccine (1 of " 2) Never done    Pneumococcal Vaccines (Age 50+) (1 of 1 - PCV) Never done    Mammogram  07/17/2025       Problem List Items Addressed This Visit       Left ear pain - Primary    Current Assessment & Plan   Left ear flushed in clinic and pt tolerated well. States she could hear a little better after being flushed. Toradol and Decadron IM today in clinic. Amoxicillin, 3 days worth of prednisone and toradol RX given today. Will refer to ENT for further evaluation.          Relevant Medications    amoxicillin (AMOXIL) 500 MG Tab    predniSONE (DELTASONE) 20 MG tablet    ketorolac (TORADOL) 10 mg tablet    Other Relevant Orders    Ambulatory referral/consult to ENT    POCT Strep A, Molecular (Completed)    Ear wax removal (Completed)     Other Visit Diagnoses         Dizziness          Tinnitus, unspecified laterality        Relevant Orders    Ambulatory referral/consult to ENT      Hearing loss of left ear, unspecified hearing loss type        Relevant Orders    Ambulatory referral/consult to ENT            Health Maintenance Topics with due status: Not Due       Topic Last Completion Date    Influenza Vaccine 03/29/2023    Colorectal Cancer Screening 05/17/2023    Lipid Panel 06/26/2024    RSV Vaccine (Age 60+ and Pregnant patients) Not Due       Future Appointments   Date Time Provider Department Center   6/23/2025  9:30 AM Jonnathan Lerma MD [facility]   6/26/2025  8:00 AM Jaron Hugo FNP [facility]   7/23/2025 11:00 AM [facility]            Signature:      ELIZABETH JOHNSON   [address] [place]  [address]       Date of encounter: 6/16/25

## 2025-06-20 NOTE — ASSESSMENT & PLAN NOTE
Left ear flushed in clinic and pt tolerated well. States she could hear a little better after being flushed. Toradol and Decadron IM today in clinic. Amoxicillin, 3 days worth of prednisone and toradol RX given today. Will refer to ENT for further evaluation.

## 2025-06-23 ENCOUNTER — OFFICE VISIT (OUTPATIENT)
Dept: OTOLARYNGOLOGY | Facility: CLINIC | Age: 50
End: 2025-06-23
Payer: COMMERCIAL

## 2025-06-23 VITALS — BODY MASS INDEX: 27.31 KG/M2 | WEIGHT: 160 LBS | HEIGHT: 64 IN

## 2025-06-23 DIAGNOSIS — H93.19 TINNITUS, UNSPECIFIED LATERALITY: ICD-10-CM

## 2025-06-23 DIAGNOSIS — H65.22 LEFT CHRONIC SEROUS OTITIS MEDIA: Primary | ICD-10-CM

## 2025-06-23 DIAGNOSIS — H92.02 LEFT EAR PAIN: ICD-10-CM

## 2025-06-23 DIAGNOSIS — H69.90 DYSFUNCTION OF EUSTACHIAN TUBE, UNSPECIFIED LATERALITY: ICD-10-CM

## 2025-06-23 DIAGNOSIS — H91.92 HEARING LOSS OF LEFT EAR, UNSPECIFIED HEARING LOSS TYPE: ICD-10-CM

## 2025-06-23 PROCEDURE — 99213 OFFICE O/P EST LOW 20 MIN: CPT | Mod: PBBFAC | Performed by: OTOLARYNGOLOGY

## 2025-06-23 PROCEDURE — 1159F MED LIST DOCD IN RCRD: CPT | Mod: ,,, | Performed by: OTOLARYNGOLOGY

## 2025-06-23 PROCEDURE — 99999 PR PBB SHADOW E&M-EST. PATIENT-LVL III: CPT | Mod: PBBFAC,,, | Performed by: OTOLARYNGOLOGY

## 2025-06-23 PROCEDURE — 1160F RVW MEDS BY RX/DR IN RCRD: CPT | Mod: ,,, | Performed by: OTOLARYNGOLOGY

## 2025-06-23 PROCEDURE — 99204 OFFICE O/P NEW MOD 45 MIN: CPT | Mod: S$PBB,,, | Performed by: OTOLARYNGOLOGY

## 2025-06-23 PROCEDURE — 3008F BODY MASS INDEX DOCD: CPT | Mod: ,,, | Performed by: OTOLARYNGOLOGY

## 2025-06-23 RX ORDER — METHYLPREDNISOLONE 4 MG/1
TABLET ORAL
Qty: 1 EACH | Refills: 0 | Status: SHIPPED | OUTPATIENT
Start: 2025-06-23

## 2025-06-23 RX ORDER — CIPROFLOXACIN 500 MG/1
500 TABLET, FILM COATED ORAL 2 TIMES DAILY
Qty: 20 TABLET | Refills: 0 | Status: SHIPPED | OUTPATIENT
Start: 2025-06-23

## 2025-06-23 RX ORDER — FLUTICASONE PROPIONATE 50 MCG
2 SPRAY, SUSPENSION (ML) NASAL DAILY
Qty: 16 G | Refills: 2 | Status: SHIPPED | OUTPATIENT
Start: 2025-06-23

## 2025-06-23 NOTE — PROGRESS NOTES
Subjective:       Patient ID: Rubia Contreras is a 50 y.o. female.    Chief Complaint: Otalgia (Left ear. Pt states her left is has been hurting x 1 month. States she is currently on 2nd round of po abx and has used ear gtts with no relief. ) and Cerumen Impaction (Left ear. Pt states she had her ears washed out and then used debrox to remove some more wax.)    Otalgia   Associated symptoms include ear discharge.     Review of Systems   HENT:  Positive for congestion, ear discharge and ear pain.    All other systems reviewed and are negative.      Objective:      Physical Exam  General: NAD  Head: Normocephalic, atraumatic, no facial asymmetry/normal strength,  Ears: Both auricules normal in appearance, w/o deformities tympanic membranes fluid scarring external auditory canals normal  Nose: External nose w/o deformities normal turbinates no drainage or inflammation  Oral Cavity: Lips, gums, floor of mouth, tongue hard palate, and buccal mucosa without mass/lesion  Oropharynx: Mucosa pink and moist, soft palate, posterior pharynx and oropharyngeal wall without mass/lesion  Neck: Supple, symmetric, trachea midline, no palpable mass/lesion, no palpable cervical lymphadenopathy  Skin: Warm and dry, no concerning lesions  Respiratory: Respirations even, unlabored    Assessment:       1. Left chronic serous otitis media    2. Left ear pain    3. Tinnitus, unspecified laterality    4. Hearing loss of left ear, unspecified hearing loss type    5. Dysfunction of Eustachian tube, unspecified laterality        Plan:       Medrol cipro flonase   May need tubes

## 2025-06-24 ENCOUNTER — PATIENT MESSAGE (OUTPATIENT)
Dept: FAMILY MEDICINE | Facility: CLINIC | Age: 50
End: 2025-06-24
Payer: COMMERCIAL

## 2025-07-09 DIAGNOSIS — K21.9 GASTROESOPHAGEAL REFLUX DISEASE WITHOUT ESOPHAGITIS: ICD-10-CM

## 2025-07-09 DIAGNOSIS — G43.011 INTRACTABLE MIGRAINE WITHOUT AURA AND WITH STATUS MIGRAINOSUS: ICD-10-CM

## 2025-07-09 RX ORDER — SUMATRIPTAN SUCCINATE 100 MG/1
100 TABLET ORAL
Qty: 30 TABLET | Refills: 5 | Status: SHIPPED | OUTPATIENT
Start: 2025-07-09

## 2025-07-09 RX ORDER — PANTOPRAZOLE SODIUM 40 MG/1
40 TABLET, DELAYED RELEASE ORAL DAILY
Qty: 90 TABLET | Refills: 1 | Status: SHIPPED | OUTPATIENT
Start: 2025-07-09

## 2025-07-09 RX ORDER — TOPIRAMATE 100 MG/1
100 TABLET, FILM COATED ORAL 2 TIMES DAILY
Qty: 180 TABLET | Refills: 1 | Status: SHIPPED | OUTPATIENT
Start: 2025-07-09

## 2025-07-09 NOTE — TELEPHONE ENCOUNTER
Copied from CRM #[id number]. Topic: Medications - Medication Refill  >> Jul 8, 2025 10:28 AM Greg wrote:  Who Called: Rubia Contreras    Refill or New Rx:Refill  RX Name and Strength:topiramate (TOPAMAX) 100 MG tablet  How is the patient currently taking it? (ex. 1XDay):2 x day   Is this a 30 day or 90 day RX:90  Local or Mail Order:[facility] [employer] [address] [address]  Phone: [phone] Fax: [phone]  Hours: Not open 24 hours      Ordering Provider: ryan    Who Called: Rubia Contreras    Refill or New Rx:Refill  RX Name and Strength:pantoprazole (PROTONIX) 40 MG tablet  How is the patient currently taking it? (ex. 1XDay): 1 x day   Is this a 30 day or 90 day RX:90  Local or Mail Order: [facility] [employer] [address] [address]  Phone: [phone] Fax: [phone]  Hours: Not open 24 hours      Ordering Provider: ryan  Who Called: Rubia Contreras    Refill or New Rx:Refill  RX Name and Strength:sumatriptan (IMITREX) 100 MG tablet  How is the patient currently taking it? (ex. 1XDay): 100 mg as needed for migranes  Is this a 30 day or 90 day RX:30  Local or Mail Order:[facility] [employer] [address] [address]  Phone: [phone] Fax: [phone]  Hours: Not open 24 hours      Ordering Provider: ryan      Preferred Method of Contact: Phone Call  Patient's Preferred Phone Number on File: [phone]   Best Call Back Number, if different:  Additional Information:

## 2025-07-23 ENCOUNTER — HOSPITAL ENCOUNTER (OUTPATIENT)
Dept: RADIOLOGY | Facility: HOSPITAL | Age: 50
Discharge: HOME | End: 2025-07-23
Attending: NURSE PRACTITIONER
Payer: COMMERCIAL

## 2025-07-23 VITALS — HEIGHT: 64 IN | BODY MASS INDEX: 26.29 KG/M2 | WEIGHT: 154 LBS

## 2025-07-23 DIAGNOSIS — Z12.31 VISIT FOR SCREENING MAMMOGRAM: ICD-10-CM

## 2025-07-23 PROCEDURE — 77067 SCR MAMMO BI INCL CAD: CPT | Mod: 26,,, | Performed by: RADIOLOGY

## 2025-07-23 PROCEDURE — 77063 BREAST TOMOSYNTHESIS BI: CPT | Mod: 26,,, | Performed by: RADIOLOGY

## 2025-07-23 PROCEDURE — 77067 SCR MAMMO BI INCL CAD: CPT | Mod: TC

## 2025-08-13 ENCOUNTER — OFFICE VISIT (OUTPATIENT)
Dept: OTOLARYNGOLOGY | Facility: CLINIC | Age: 50
End: 2025-08-13
Payer: COMMERCIAL

## 2025-08-13 VITALS — BODY MASS INDEX: 26.29 KG/M2 | WEIGHT: 154 LBS | HEIGHT: 64 IN

## 2025-08-13 DIAGNOSIS — H65.23 BILATERAL CHRONIC SEROUS OTITIS MEDIA: Primary | ICD-10-CM

## 2025-08-13 DIAGNOSIS — H90.2 CONDUCTIVE HEARING LOSS, UNSPECIFIED LATERALITY: ICD-10-CM

## 2025-08-13 DIAGNOSIS — H92.12 OTORRHEA OF LEFT EAR: ICD-10-CM

## 2025-08-13 PROCEDURE — 1160F RVW MEDS BY RX/DR IN RCRD: CPT | Mod: ,,, | Performed by: OTOLARYNGOLOGY

## 2025-08-13 PROCEDURE — 99999 PR PBB SHADOW E&M-EST. PATIENT-LVL V: CPT | Mod: PBBFAC,,, | Performed by: OTOLARYNGOLOGY

## 2025-08-13 PROCEDURE — 99214 OFFICE O/P EST MOD 30 MIN: CPT | Mod: S$PBB,,, | Performed by: OTOLARYNGOLOGY

## 2025-08-13 PROCEDURE — 99215 OFFICE O/P EST HI 40 MIN: CPT | Mod: PBBFAC | Performed by: OTOLARYNGOLOGY

## 2025-08-13 PROCEDURE — 1159F MED LIST DOCD IN RCRD: CPT | Mod: ,,, | Performed by: OTOLARYNGOLOGY

## 2025-08-13 PROCEDURE — 3008F BODY MASS INDEX DOCD: CPT | Mod: ,,, | Performed by: OTOLARYNGOLOGY

## 2025-08-18 ENCOUNTER — ANESTHESIA EVENT (OUTPATIENT)
Dept: SURGERY | Facility: HOSPITAL | Age: 50
End: 2025-08-18
Payer: COMMERCIAL

## 2025-08-19 ENCOUNTER — HOSPITAL ENCOUNTER (OUTPATIENT)
Facility: HOSPITAL | Age: 50
Discharge: HOME | End: 2025-08-19
Attending: OTOLARYNGOLOGY | Admitting: OTOLARYNGOLOGY
Payer: COMMERCIAL

## 2025-08-19 ENCOUNTER — ANESTHESIA (OUTPATIENT)
Dept: SURGERY | Facility: HOSPITAL | Age: 50
End: 2025-08-19
Payer: COMMERCIAL

## 2025-08-19 VITALS
DIASTOLIC BLOOD PRESSURE: 62 MMHG | TEMPERATURE: 98 F | WEIGHT: 154 LBS | RESPIRATION RATE: 14 BRPM | SYSTOLIC BLOOD PRESSURE: 104 MMHG | OXYGEN SATURATION: 100 % | BODY MASS INDEX: 26.29 KG/M2 | HEART RATE: 47 BPM | HEIGHT: 64 IN

## 2025-08-19 DIAGNOSIS — H65.23 CHRONIC SEROUS OTITIS MEDIA OF BOTH EARS: Primary | ICD-10-CM

## 2025-08-19 PROCEDURE — 25000003 PHARM REV CODE 250: Performed by: OTOLARYNGOLOGY

## 2025-08-19 PROCEDURE — 69436 CREATE EARDRUM OPENING: CPT | Mod: 50,,, | Performed by: OTOLARYNGOLOGY

## 2025-08-19 PROCEDURE — 36000704 HC OR TIME LEV I 1ST 15 MIN: Performed by: OTOLARYNGOLOGY

## 2025-08-19 PROCEDURE — 27201423 OPTIME MED/SURG SUP & DEVICES STERILE SUPPLY: Performed by: OTOLARYNGOLOGY

## 2025-08-19 PROCEDURE — 71000015 HC POSTOP RECOV 1ST HR: Performed by: OTOLARYNGOLOGY

## 2025-08-19 PROCEDURE — 63600175 PHARM REV CODE 636 W HCPCS: Performed by: ANESTHESIOLOGY

## 2025-08-19 PROCEDURE — 27000716 HC OXISENSOR PROBE, ANY SIZE: Performed by: NURSE ANESTHETIST, CERTIFIED REGISTERED

## 2025-08-19 PROCEDURE — 37000008 HC ANESTHESIA 1ST 15 MINUTES: Performed by: OTOLARYNGOLOGY

## 2025-08-19 PROCEDURE — 71000033 HC RECOVERY, INTIAL HOUR: Performed by: OTOLARYNGOLOGY

## 2025-08-19 PROCEDURE — 63600175 PHARM REV CODE 636 W HCPCS: Performed by: NURSE ANESTHETIST, CERTIFIED REGISTERED

## 2025-08-19 DEVICE — GROMMET MOD ARMSTR 1.14MM: Type: IMPLANTABLE DEVICE | Site: EAR | Status: FUNCTIONAL

## 2025-08-19 RX ORDER — MIDAZOLAM HYDROCHLORIDE 1 MG/ML
INJECTION INTRAMUSCULAR; INTRAVENOUS
Status: DISCONTINUED | OUTPATIENT
Start: 2025-08-19 | End: 2025-08-19

## 2025-08-19 RX ORDER — HYDROMORPHONE HYDROCHLORIDE 2 MG/ML
0.5 INJECTION, SOLUTION INTRAMUSCULAR; INTRAVENOUS; SUBCUTANEOUS EVERY 5 MIN PRN
Status: DISCONTINUED | OUTPATIENT
Start: 2025-08-19 | End: 2025-08-19 | Stop reason: HOSPADM

## 2025-08-19 RX ORDER — FENTANYL CITRATE 50 UG/ML
INJECTION, SOLUTION INTRAMUSCULAR; INTRAVENOUS
Status: DISCONTINUED | OUTPATIENT
Start: 2025-08-19 | End: 2025-08-19

## 2025-08-19 RX ORDER — DIPHENHYDRAMINE HYDROCHLORIDE 50 MG/ML
25 INJECTION, SOLUTION INTRAMUSCULAR; INTRAVENOUS EVERY 6 HOURS PRN
Status: DISCONTINUED | OUTPATIENT
Start: 2025-08-19 | End: 2025-08-19 | Stop reason: HOSPADM

## 2025-08-19 RX ORDER — ONDANSETRON HYDROCHLORIDE 2 MG/ML
INJECTION, SOLUTION INTRAVENOUS
Status: DISCONTINUED | OUTPATIENT
Start: 2025-08-19 | End: 2025-08-19

## 2025-08-19 RX ORDER — DEXAMETHASONE SODIUM PHOSPHATE 4 MG/ML
INJECTION, SOLUTION INTRA-ARTICULAR; INTRALESIONAL; INTRAMUSCULAR; INTRAVENOUS; SOFT TISSUE
Status: DISCONTINUED | OUTPATIENT
Start: 2025-08-19 | End: 2025-08-19

## 2025-08-19 RX ORDER — GLUCAGON 1 MG
1 KIT INJECTION
Status: DISCONTINUED | OUTPATIENT
Start: 2025-08-19 | End: 2025-08-19 | Stop reason: HOSPADM

## 2025-08-19 RX ORDER — IPRATROPIUM BROMIDE AND ALBUTEROL SULFATE 2.5; .5 MG/3ML; MG/3ML
3 SOLUTION RESPIRATORY (INHALATION) ONCE AS NEEDED
Status: DISCONTINUED | OUTPATIENT
Start: 2025-08-19 | End: 2025-08-19 | Stop reason: HOSPADM

## 2025-08-19 RX ORDER — LIDOCAINE HYDROCHLORIDE 20 MG/ML
INJECTION, SOLUTION EPIDURAL; INFILTRATION; INTRACAUDAL; PERINEURAL
Status: DISCONTINUED | OUTPATIENT
Start: 2025-08-19 | End: 2025-08-19

## 2025-08-19 RX ORDER — PROPOFOL 10 MG/ML
VIAL (ML) INTRAVENOUS
Status: DISCONTINUED | OUTPATIENT
Start: 2025-08-19 | End: 2025-08-19

## 2025-08-19 RX ORDER — SODIUM CHLORIDE 9 MG/ML
INJECTION, SOLUTION INTRAVENOUS CONTINUOUS
Status: DISCONTINUED | OUTPATIENT
Start: 2025-08-19 | End: 2025-08-19 | Stop reason: HOSPADM

## 2025-08-19 RX ORDER — ONDANSETRON HYDROCHLORIDE 2 MG/ML
4 INJECTION, SOLUTION INTRAVENOUS DAILY PRN
Status: DISCONTINUED | OUTPATIENT
Start: 2025-08-19 | End: 2025-08-19 | Stop reason: HOSPADM

## 2025-08-19 RX ORDER — MORPHINE SULFATE 10 MG/ML
4 INJECTION INTRAMUSCULAR; INTRAVENOUS; SUBCUTANEOUS EVERY 5 MIN PRN
Status: DISCONTINUED | OUTPATIENT
Start: 2025-08-19 | End: 2025-08-19 | Stop reason: HOSPADM

## 2025-08-19 RX ORDER — ACETAMINOPHEN 10 MG/ML
1000 INJECTION, SOLUTION INTRAVENOUS ONCE
Status: COMPLETED | OUTPATIENT
Start: 2025-08-19 | End: 2025-08-19

## 2025-08-19 RX ADMIN — LIDOCAINE HYDROCHLORIDE 25 MG: 20 INJECTION, SOLUTION EPIDURAL; INFILTRATION; INTRACAUDAL; PERINEURAL at 07:08

## 2025-08-19 RX ADMIN — PROPOFOL 25 MG: 10 INJECTION, EMULSION INTRAVENOUS at 07:08

## 2025-08-19 RX ADMIN — ACETAMINOPHEN 1000 MG: 10 INJECTION, SOLUTION INTRAVENOUS at 07:08

## 2025-08-19 RX ADMIN — SODIUM CHLORIDE: 9 INJECTION, SOLUTION INTRAVENOUS at 07:08

## 2025-08-19 RX ADMIN — DEXAMETHASONE SODIUM PHOSPHATE 6 MG: 4 INJECTION, SOLUTION INTRA-ARTICULAR; INTRALESIONAL; INTRAMUSCULAR; INTRAVENOUS; SOFT TISSUE at 07:08

## 2025-08-19 RX ADMIN — MIDAZOLAM HYDROCHLORIDE 2 MG: 1 INJECTION, SOLUTION INTRAMUSCULAR; INTRAVENOUS at 07:08

## 2025-08-19 RX ADMIN — ONDANSETRON 4 MG: 2 INJECTION INTRAMUSCULAR; INTRAVENOUS at 07:08

## 2025-08-19 RX ADMIN — FENTANYL CITRATE 100 MCG: 50 INJECTION INTRAMUSCULAR; INTRAVENOUS at 07:08

## 2025-08-28 ENCOUNTER — OFFICE VISIT (OUTPATIENT)
Dept: OTOLARYNGOLOGY | Facility: CLINIC | Age: 50
End: 2025-08-28
Payer: COMMERCIAL

## 2025-08-28 VITALS — BODY MASS INDEX: 26.29 KG/M2 | HEIGHT: 64 IN | WEIGHT: 154 LBS

## 2025-08-28 DIAGNOSIS — H65.23 BILATERAL CHRONIC SEROUS OTITIS MEDIA: Primary | ICD-10-CM

## 2025-08-28 PROCEDURE — 99213 OFFICE O/P EST LOW 20 MIN: CPT | Mod: PBBFAC | Performed by: OTOLARYNGOLOGY

## 2025-08-28 PROCEDURE — 1159F MED LIST DOCD IN RCRD: CPT | Mod: ,,, | Performed by: OTOLARYNGOLOGY

## 2025-08-28 PROCEDURE — 99024 POSTOP FOLLOW-UP VISIT: CPT | Mod: ,,, | Performed by: OTOLARYNGOLOGY

## 2025-08-28 PROCEDURE — 1160F RVW MEDS BY RX/DR IN RCRD: CPT | Mod: ,,, | Performed by: OTOLARYNGOLOGY

## 2025-08-28 PROCEDURE — 99999 PR PBB SHADOW E&M-EST. PATIENT-LVL III: CPT | Mod: PBBFAC,,, | Performed by: OTOLARYNGOLOGY

## (undated) DEVICE — SYR 30CC LUER LOCK

## (undated) DEVICE — CATH SPYGLASS DISCOVER DIGITAL

## (undated) DEVICE — SOL NACL IRR 1000ML BTL

## (undated) DEVICE — GLOVE SENSICARE PI SURG 6

## (undated) DEVICE — Device

## (undated) DEVICE — ELECTRODE LAPSCP L HOOK 36CM

## (undated) DEVICE — GLOVE SENSICARE PI SURG 7.5

## (undated) DEVICE — STRIP MEDI WND CLSR 1/2X4IN

## (undated) DEVICE — KIT PROCEDURE STER INLET CLOSU

## (undated) DEVICE — GLOVE SENSICARE PI SURG 8

## (undated) DEVICE — CORD LAP 10 DISP

## (undated) DEVICE — GLOVE SENSICARE PI SURG 6.5

## (undated) DEVICE — COTTONBALL LARGE STRL

## (undated) DEVICE — CANNULA LAP SEAL Z THRD 5X100

## (undated) DEVICE — SUT PDS II O CT-2 VIL MONO

## (undated) DEVICE — TROCAR KII BLLN 12MM 10CM

## (undated) DEVICE — IRRIGATOR ENDOSCOPY DISP.

## (undated) DEVICE — GLOVE SENSICARE PI GRN 7

## (undated) DEVICE — TUBE SUCTION MEDI-VAC STERILE

## (undated) DEVICE — TROCAR ENDO Z THREAD KII 5X100

## (undated) DEVICE — SET PNEUMOCLEAR HEAT HUM SE HF

## (undated) DEVICE — BLADE SPEAR TIP BEAVER 45DEG

## (undated) DEVICE — GLOVE SENSICARE PI GRN 8

## (undated) DEVICE — WARMER BLUE HEAT SCOPE 3-12MM

## (undated) DEVICE — APPLIER CLIP ENDO LIGAMAX 5MM

## (undated) DEVICE — SUT 4-0 VICRYL / FS-2

## (undated) DEVICE — GLOVE SENSICARE PI GRN 6

## (undated) DEVICE — CATH EXAMINE CHOLGM PERC INTRO